# Patient Record
Sex: FEMALE | Race: WHITE | NOT HISPANIC OR LATINO | Employment: UNEMPLOYED | URBAN - METROPOLITAN AREA
[De-identification: names, ages, dates, MRNs, and addresses within clinical notes are randomized per-mention and may not be internally consistent; named-entity substitution may affect disease eponyms.]

---

## 2018-01-16 ENCOUNTER — PATIENT MESSAGE (OUTPATIENT)
Dept: OBSTETRICS AND GYNECOLOGY | Facility: CLINIC | Age: 28
End: 2018-01-16

## 2018-01-21 DIAGNOSIS — O26.891 PELVIC PAIN IN PREGNANCY, ANTEPARTUM, FIRST TRIMESTER: Primary | ICD-10-CM

## 2018-01-21 DIAGNOSIS — R10.2 PELVIC PAIN IN PREGNANCY, ANTEPARTUM, FIRST TRIMESTER: Primary | ICD-10-CM

## 2018-01-22 ENCOUNTER — TELEPHONE (OUTPATIENT)
Dept: OBSTETRICS AND GYNECOLOGY | Facility: CLINIC | Age: 28
End: 2018-01-22

## 2018-01-22 NOTE — PROGRESS NOTES
Pt with c/o sharp pelvic pain in early pregnancy.  No bleeding.  Will order US to establish viability and r/o ectopic

## 2018-01-22 NOTE — TELEPHONE ENCOUNTER
Spoke to pt. About scheduling ultrasound appointment.  Spoke  to midwife to confirm plan.  Pt has appointment tomorrow at 1 pm to confirm pregnancy and will schedule u/s after appointment with provider.  Instructed pt if abdominal pain worsens before tomorrow's visit to go to ED for assessment.  Pt verbalized understanding.

## 2018-01-23 ENCOUNTER — HOSPITAL ENCOUNTER (OUTPATIENT)
Dept: RADIOLOGY | Facility: OTHER | Age: 28
Discharge: HOME | End: 2018-01-23
Attending: ADVANCED PRACTICE MIDWIFE
Payer: COMMERCIAL

## 2018-01-23 ENCOUNTER — OFFICE VISIT (OUTPATIENT)
Dept: OBSTETRICS AND GYNECOLOGY | Facility: CLINIC | Age: 28
End: 2018-01-23
Payer: COMMERCIAL

## 2018-01-23 VITALS
HEIGHT: 61 IN | BODY MASS INDEX: 19 KG/M2 | SYSTOLIC BLOOD PRESSURE: 112 MMHG | WEIGHT: 100.63 LBS | DIASTOLIC BLOOD PRESSURE: 68 MMHG

## 2018-01-23 DIAGNOSIS — O21.9 NAUSEA AND VOMITING DURING PREGNANCY PRIOR TO 22 WEEKS GESTATION: ICD-10-CM

## 2018-01-23 DIAGNOSIS — Z86.59 HISTORY OF POSTPARTUM DEPRESSION, CURRENTLY PREGNANT: ICD-10-CM

## 2018-01-23 DIAGNOSIS — R10.2 PELVIC PAIN IN PREGNANCY, ANTEPARTUM, FIRST TRIMESTER: Primary | ICD-10-CM

## 2018-01-23 DIAGNOSIS — R10.2 PELVIC PAIN IN PREGNANCY, ANTEPARTUM, FIRST TRIMESTER: ICD-10-CM

## 2018-01-23 DIAGNOSIS — O26.891 PELVIC PAIN IN PREGNANCY, ANTEPARTUM, FIRST TRIMESTER: ICD-10-CM

## 2018-01-23 DIAGNOSIS — Z91.89 AT RISK FOR INEFFECTIVE BREASTFEEDING: ICD-10-CM

## 2018-01-23 DIAGNOSIS — O26.891 PELVIC PAIN IN PREGNANCY, ANTEPARTUM, FIRST TRIMESTER: Primary | ICD-10-CM

## 2018-01-23 DIAGNOSIS — O99.891 HISTORY OF POSTPARTUM DEPRESSION, CURRENTLY PREGNANT: ICD-10-CM

## 2018-01-23 DIAGNOSIS — Z32.00 POSSIBLE PREGNANCY: ICD-10-CM

## 2018-01-23 DIAGNOSIS — Z87.59 HISTORY OF 3 SPONTANEOUS ABORTIONS: ICD-10-CM

## 2018-01-23 DIAGNOSIS — Z34.91 FIRST TRIMESTER PREGNANCY: ICD-10-CM

## 2018-01-23 DIAGNOSIS — O34.40 CERVICAL ABNORMALITY AFFECTING PREGNANCY, ANTEPARTUM: ICD-10-CM

## 2018-01-23 LAB
B-HCG UR QL: POSITIVE
CTP QC/QA: YES

## 2018-01-23 PROCEDURE — 76801 OB US < 14 WKS SINGLE FETUS: CPT | Mod: TC

## 2018-01-23 PROCEDURE — 76801 OB US < 14 WKS SINGLE FETUS: CPT | Mod: 26,,, | Performed by: RADIOLOGY

## 2018-01-23 PROCEDURE — 99999 PR PBB SHADOW E&M-EST. PATIENT-LVL III: CPT | Mod: PBBFAC,,, | Performed by: ADVANCED PRACTICE MIDWIFE

## 2018-01-23 PROCEDURE — 81025 URINE PREGNANCY TEST: CPT | Mod: S$GLB,,, | Performed by: ADVANCED PRACTICE MIDWIFE

## 2018-01-23 PROCEDURE — 99203 OFFICE O/P NEW LOW 30 MIN: CPT | Mod: S$GLB,,, | Performed by: ADVANCED PRACTICE MIDWIFE

## 2018-01-23 PROCEDURE — 87491 CHLMYD TRACH DNA AMP PROBE: CPT

## 2018-01-23 RX ORDER — PROMETHAZINE HYDROCHLORIDE 25 MG/1
25 TABLET ORAL EVERY 6 HOURS PRN
Qty: 30 TABLET | Refills: 0 | Status: SHIPPED | OUTPATIENT
Start: 2018-01-23 | End: 2018-02-02

## 2018-01-23 RX ORDER — FERROUS SULFATE 325(65) MG
325 TABLET ORAL
Status: ON HOLD | COMMUNITY
End: 2018-09-04 | Stop reason: HOSPADM

## 2018-01-23 RX ORDER — DIPHENHYDRAMINE HCL 50 MG
50 CAPSULE ORAL EVERY 6 HOURS PRN
COMMUNITY
End: 2018-01-23 | Stop reason: ALTCHOICE

## 2018-01-23 NOTE — PROGRESS NOTES
Ratna Hanson is a 27 y.o. , presents today for amenorrhea.      C/C: amenorrhea, possible pregnancy    HPI: Reports amenorrhea since Patient's last menstrual period was 2017 (exact date). Prior to LMP, menses were regular occuring every 28 days prior.  Current contraception  none .    + UPT on (2017). does report nausea and vomiting, taking Unisom and Vit B6, has taken Zofran, would prefer Phenergan. does report breast tenderness. does not report vaginal bleeding since LMP.  Pt does report right pelvic pain x last week.    PAP HISTORY: last pap (2017), result Normal does not have a history of abnormal paps    Updated Medication List:  Current Outpatient Prescriptions   Medication Sig Dispense Refill    diphenhydrAMINE (BENADRYL) 50 MG capsule Take 50 mg by mouth every 6 (six) hours as needed for Itching.      ferrous sulfate 325 mg (65 mg iron) Tab tablet Take 325 mg by mouth daily with breakfast.      PNV COMB NO.59/IRON/FA/DHA (PRENATAL-DHA ORAL) Take by mouth.      PRENATAL VIT/IRON FUM/FOLIC AC (PRENATAL-FOLIC ACID ORAL) Take by mouth.       No current facility-administered medications for this visit.        SOCIAL HISTORY: denies  emotional/mental/physical/sexual violence or abuse. does  Feel safe at home. Accompanied today by .  This is their first child together.    Review of patient's allergies indicates:  No Known Allergies  Past Medical History:   Diagnosis Date    Breast disorder     breast cysts    Postpartum depression     used seroquil     Past Surgical History:   Procedure Laterality Date    BREAST SURGERY      augmentation bilateral    CHOLECYSTECTOMY      gall bladder Bilateral      Past Surgical History:   Procedure Laterality Date    BREAST SURGERY      augmentation bilateral    CHOLECYSTECTOMY      gall bladder Bilateral      OB History    Para Term  AB Living   2 1 1     1   SAB TAB Ectopic Multiple Live Births           1      # Outcome  Date GA Lbr Albaro/2nd Weight Sex Delivery Anes PTL Lv   2 Current            1 Term 06 38w0d  2.948 kg (6 lb 8 oz) M Vag-Vacuum EPI  SOWMYA      Complications: Failure to Progress in First Stage        OB History      Para Term  AB Living    2 1 1     1    SAB TAB Ectopic Multiple Live Births            1        Social History     Social History    Marital status:      Spouse name: N/A    Number of children: N/A    Years of education: N/A     Occupational History    Not on file.     Social History Main Topics    Smoking status: Never Smoker    Smokeless tobacco: Never Used    Alcohol use Yes      Comment: none since pregnancy    Drug use: No    Sexual activity: Yes     Birth control/ protection: None      Comment: planning pregnancy     Other Topics Concern    Not on file     Social History Narrative    No narrative on file     Family History   Problem Relation Age of Onset    Heart disease Mother     Heart attacks under age 50 Mother     No Known Problems Father     Hyperlipidemia Sister     Heart disease Paternal Grandfather     No Known Problems Paternal Grandmother     No Known Problems Maternal Grandmother      History   Sexual Activity    Sexual activity: Yes    Birth control/ protection: None     Comment: planning pregnancy       GENETIC SCREENING   Patient's age 35 years or older as of estimated date of delivery? No  Nural tube defect (meningomyelocele, spina bifida, or anencephaly)? no  Down syndrome? no  Alexei-Sachs (Ashkenazi Episcopalian, Cajun, Bel[place]ian [place])? no  Canavan disease (Ashkenazi Episcopalian)? no  Familial dysautonomia (Ashkenazi Episcopalian)? no  Sickle cell disease or trait ()? no  Hemophilia or other blood disorders? no  Cystic fibrosis? no  Muscular dystrophy? no  Janet's chorea? no  Thalassemia (Italian, Greek, Mediterranean, or  background) MCV less than 80? no  Congenital heart defect? no  Mental retardation/autism? no   If Yes, was person  "tested for Fragile X? no  Other inherited genetic or chromosomal disorder? no  Maternal metabolic disorder (e.g. type 1 diabetes, PKU)? no  Patient or baby's father had a child with birth defects not listed above? no  Recurrent pregnancy loss or a stillbirth: ? SAB x 3  Medications (including supplements, vitamins, herbs or OTC drugs)/illicit/recreational drugs/alcohol since last menstrual period? Prenatal vitamins   If yes, agent(s) and strength/dose:   List any other genetic risks:   Comments/counseling:     INFECTION HISTORY  Live with someone with TB or exposed to TB: no  Patient or partner has history of genital herpes: no  Rash or viral illness since last menstrual period: no  Patient or partner has hepatitis B or C: no  History of STD, gonorrhea, chlamydia, HPV, HIV, syphilis (list all that apply): no  List other infections: non  Additional comments:       ROS: Constitutional/Gen: Denies fevers, chills, malaise, or weight loss. reports  fatigue   Psych: Denies depression, anxiety  Eyes: Denies changes in vision or scotomata  Ears, nose, mouth, throat: Denies sinus tenderness, swelling, or dentition problems  CV/vasc: Denies heart palpitations or edema  Resp: Denies SOB or dyspnea  Breasts: Denies mass, nipple discharge, or trauma.reports  breast tenderness.  GI: reports constipation, no diarrhea, reports  vomiting. reports  nausea.  : Denies vaginal discharge, dysuria or pelvic pain. reports  urinary frequency  MS: Denies weakness, soreness, or changes in ROM    OBJECTIVE:  /68   Ht 5' 1" (1.549 m)   Wt 45.7 kg (100 lb 10.2 oz)   LMP 11/29/2017 (Exact Date)   BMI 19.02 kg/m²   Constitutional/Gen: NAD, appears stated age, oriented x 3  Neck: supple, no masses or enlargement  Head: normocephalic  Skin: warm and dry w/o rash  Mouth:  Caries absent  Lung: normal resp effort, CTAB  Heart: normal HR, RRR   Abdomen: soft, nontender, no masses  Lymph nodes: Cervical, supraclavicular, axillary, and " inguinal nodes normal.  External genitalia: no lesions or discharge, normal hair distribution  Urethral meatus: normal size and location, no lesions or prolapse  Vagina: normal appearance, no lesions, no discharge,  Cervix: normal appearance, no discharge, no lesions, negative CMT  Uterus: nontender, mobile, approx 8 week size, normal contour and position.  Adnexa: tenderness on left side, 2 cm firm, round, mobile mass left side  Anus/Perineum: normal appearance, with no lesions or discharge. Internal exam deferred.  Extremities: FROM, with no edema or tenderness.  Neurologic: A&O x 4  Psych: affect appropriate and without signs of mood, thought or memory difficulty appreciated    UPT positive in office    ASSESSMENT:  27 y.o. female  with amenorrhea  Pregnancy confirmed  Likely at 7w6d S equal D  Body mass index is 19.02 kg/m².   Patient Active Problem List   Diagnosis    Nausea and vomiting during pregnancy prior to 22 weeks gestation    History of 3 spontaneous abortions    Cervical abnormality affecting pregnancy, antepartum    Pelvic pain in pregnancy, antepartum, first trimester    History of postpartum depression, currently pregnant         PLAN:  Amenorrhea  -- + UPT in office, Patient's last menstrual period was 2017 (exact date). --> Estimated Date of Delivery: 18.  DANIEL confirmed by US today  -- Dating US today in imaging to r/o ectopic.  Single, viable IUP  -- Anatomical US ordered  -- Routine  prenatal labs drawn today  -- GC/CT collected today  Pelvic pain:  US today to r/o ectopic pregnancy  Left ovary with small cysts and corpus luteum cysts on US, c/w physical findings.  No physical or ultrasound findings to explain right pelvic pain.  Pt thinks pain is more in her hip bone than in pelvis.  Will f/u prn  N&V:  Using Unisom and Vitamin B-6 with little relief.  Has used Zofran a few times with relief of sx.  Discussed Zofran no longer recommended in early pregnancy.  Rx for  "Phenergan sent to pharmacy.  Hx SAB x 3:  Pt had pos pregnancy test x 3 in 2017 followed by menses at around the expected time.  No f/u or clinical confirmation of pregnancy.  On us today, "Please note there is fluid within the cervical canal distally measuring 1.9 cm in length with slight cervical funneling concerning for possible early cervical incompetence".  Pt with hx term pregnancy, no hx cervical surgery.  Will refer to [facility] for evaluation of cervical length.  Hx postpartum depression:  Will evaluate prn       Body mass index is 19.02 kg/m².  -- Discussed IOM recommended weight gain of:   Underweight Less than 18.5 28-40    Normal Weight 18.5-24.9  25-35    Overweight 25-29.9  15-25    Obese   30 and greater  11-20   -- Discussed criteria for delivery at [facility] r/t excessive pre-preg weight or excessive weight gain:   Pre-pregnancy BMI over 40 or excess pregnancy weight gain defined as:   Pre-preg BMI < 18.5; Excess weight gain = > 60 pound   Pre-preg BMI 18.5-24.9;  Excess weight gain = > 53 pounds   Pre-preg BMI 25-29.9;  Excess weight gain = > 38 pounds   Pre-preg BMI > 30;  Excess weight gain = > 30 pounds     Discussed nausea and vomiting in pregnancy  -- Education regarding lifestyle and dietary modifications  -- Reviewed use of B6/Unisom prn. Pt will notify us if no relief/worsening symptoms, will consider alternative therapies prn    Pregnancy education and counseling; handouts and booklet provided  -- She has not already attended [facility] and has not toured facility.  Will do so today after appointment  -- Oriented to practice and anticipated prenatal course of care and how to contact us  -- Precautions/warning signs reviewed  -- Common complaints of pregnancy  -- Routine prenatal labs including HIV  -- Ultrasounds  -- Childbirth education/hospital/[facility] facilities  -- Nutrition, prepregnant BMI, and recommended weight gain  -- Toxoplasmosis precautions (Cats/Raw Meat) .. 2 days  -- Sexual activity " and exercise  -- Environmental/Work hazards  -- Travel  -- Tobacco (Ask, Advise, Assess, Assist, and Arrange), as well as alcohol and drug use  -- Use of any medications (Including supplements, Vitamins, Herbs, or OTC Drugs)  -- Domestic violence screen      Reviewed genetic testing options. Reviewed available first trimester and/or second   trimester screening options. Reviewed risk of false positive/negative results and recommendation of referral to [facility] in event of a positive result, for NIPT, US, and/or amniocentesis.  Patient does not desire genetic screening. Reminded pt that screening options are time dependent and to call us if changes her mind            Reviewed warning signs, precautions, and how/when to contact us.        RTC 4 weeks, call or present sooner prn.

## 2018-01-24 LAB
C TRACH DNA SPEC QL NAA+PROBE: NOT DETECTED
N GONORRHOEA DNA SPEC QL NAA+PROBE: NOT DETECTED

## 2018-01-30 ENCOUNTER — PATIENT MESSAGE (OUTPATIENT)
Dept: OBSTETRICS AND GYNECOLOGY | Facility: CLINIC | Age: 28
End: 2018-01-30

## 2018-01-31 ENCOUNTER — TELEPHONE (OUTPATIENT)
Dept: MATERNAL FETAL MEDICINE | Facility: CLINIC | Age: 28
End: 2018-01-31

## 2018-01-31 NOTE — TELEPHONE ENCOUNTER
"After discussing with Dr. Crouch, 12 weeks gestation would be the appropriate time frame that we saw patient for consult and ultrasound.    Nurse phoned patient and apologized for the delay in calling to schedule her appointment. Nurse reiterated to patient that she discussed with Dr. Crouch regarding her Consult and Ultrasound. Patient verbalized understanding for information received and is scheduled for an ultrasound and consult on Wednesday 02/21/2018 with Dr. Duarte.     Since patient is being scanned around 12 weeks, nurse did inquire if patient was interested in the NT/Sequential Screen and patient wanted to know if this was part of routine pregnancy course. Nurse clarified that it is offered to all pregnant women at [facility], but it is an optional screening within pregnancy. Patient did elect to have the NT scheduled at her consult, but nurse let patient know if between now and then she decides to not do the NT we can always cancel that portion.    Patient verbalized understanding of all information received. Nurse to route message to the midwives to update.      ----- Message from Kia Majano CNM sent at 1/30/2018  6:21 PM CST -----  Regarding: Hi, this patient was referred for evaluation due to early report of "funneling in cervix."   Hi,   It was ordered 1/23 by NOELLE Johnson CNM and the patient stated she had not had that appointment scheduled yet. She is about 9 weeks gestation with a DANIEL of 9/5/2018. When would the [facility] docs like to see her?  Thanks for clarifying,  Kia"

## 2018-02-19 ENCOUNTER — PATIENT MESSAGE (OUTPATIENT)
Dept: OBSTETRICS AND GYNECOLOGY | Facility: CLINIC | Age: 28
End: 2018-02-19

## 2018-02-20 PROBLEM — O09.299 H/O DELIVERY BY VACUUM EXTRACTION, CURRENTLY PREGNANT: Status: ACTIVE | Noted: 2018-02-20

## 2018-02-21 ENCOUNTER — OFFICE VISIT (OUTPATIENT)
Dept: MATERNAL FETAL MEDICINE | Facility: CLINIC | Age: 28
End: 2018-02-21
Attending: OBSTETRICS & GYNECOLOGY
Payer: COMMERCIAL

## 2018-02-21 ENCOUNTER — PATIENT MESSAGE (OUTPATIENT)
Dept: OBSTETRICS AND GYNECOLOGY | Facility: CLINIC | Age: 28
End: 2018-02-21

## 2018-02-21 ENCOUNTER — ROUTINE PRENATAL (OUTPATIENT)
Dept: OBSTETRICS AND GYNECOLOGY | Facility: CLINIC | Age: 28
End: 2018-02-21
Payer: COMMERCIAL

## 2018-02-21 VITALS
SYSTOLIC BLOOD PRESSURE: 118 MMHG | DIASTOLIC BLOOD PRESSURE: 80 MMHG | WEIGHT: 104.94 LBS | BODY MASS INDEX: 19.83 KG/M2

## 2018-02-21 DIAGNOSIS — Z34.91 FIRST TRIMESTER PREGNANCY: ICD-10-CM

## 2018-02-21 DIAGNOSIS — O21.9 NAUSEA/VOMITING IN PREGNANCY: ICD-10-CM

## 2018-02-21 DIAGNOSIS — Z34.91 PRENATAL CARE IN FIRST TRIMESTER: Primary | ICD-10-CM

## 2018-02-21 PROBLEM — Z91.89 AT RISK FOR INEFFECTIVE BREASTFEEDING: Status: RESOLVED | Noted: 2018-01-23 | Resolved: 2018-02-21

## 2018-02-21 PROBLEM — O34.40: Status: RESOLVED | Noted: 2018-01-23 | Resolved: 2018-02-21

## 2018-02-21 PROCEDURE — 99999 PR PBB SHADOW E&M-EST. PATIENT-LVL III: CPT | Mod: PBBFAC,,, | Performed by: ADVANCED PRACTICE MIDWIFE

## 2018-02-21 PROCEDURE — 87086 URINE CULTURE/COLONY COUNT: CPT

## 2018-02-21 PROCEDURE — 99202 OFFICE O/P NEW SF 15 MIN: CPT | Mod: 25,S$GLB,, | Performed by: OBSTETRICS & GYNECOLOGY

## 2018-02-21 PROCEDURE — 76813 OB US NUCHAL MEAS 1 GEST: CPT | Mod: S$GLB,,, | Performed by: OBSTETRICS & GYNECOLOGY

## 2018-02-21 PROCEDURE — 3008F BODY MASS INDEX DOCD: CPT | Mod: S$GLB,,, | Performed by: OBSTETRICS & GYNECOLOGY

## 2018-02-21 PROCEDURE — 76801 OB US < 14 WKS SINGLE FETUS: CPT | Mod: S$GLB,,, | Performed by: OBSTETRICS & GYNECOLOGY

## 2018-02-21 PROCEDURE — 99999 PR PBB SHADOW E&M-EST. PATIENT-LVL II: CPT | Mod: PBBFAC,,, | Performed by: OBSTETRICS & GYNECOLOGY

## 2018-02-21 PROCEDURE — 0502F SUBSEQUENT PRENATAL CARE: CPT | Mod: S$GLB,,, | Performed by: ADVANCED PRACTICE MIDWIFE

## 2018-02-21 RX ORDER — PROMETHAZINE HYDROCHLORIDE 25 MG/1
25 TABLET ORAL EVERY 4 HOURS
Qty: 15 TABLET | Refills: 0 | Status: SHIPPED | OUTPATIENT
Start: 2018-02-21 | End: 2018-03-12 | Stop reason: SDUPTHER

## 2018-02-21 NOTE — LETTER
February 21, 2018      Marquita Johnson, TYESHA  [address]  [facility]  [facility] [address]           [facility]  [address]  [place] [address]  Phone: [phone]          Patient: Ratna Hanson   MR Number: [id number]   YOB: 1990   Date of Visit: 2/21/2018       Dear Marquita Johnson:    Thank you for referring Ratna Hanson to me for evaluation. Attached you will find relevant portions of my assessment and plan of care.    If you have questions, please do not hesitate to call me. I look forward to following Ratna Hanson along with you.    Sincerely,    Leonard Duarte MD    Enclosure  CC:  No Recipients    If you would like to receive this communication electronically, please contact [email or web address] or [phone] to request more information on [employer] access.    For providers and/or their staff who would like to refer a patient to [facility], please contact us through our one-stop-shop provider referral line, [facility], at [phone].    If you feel you have received this communication in error or would no longer like to receive these types of communications, please e-mail [email or web address]

## 2018-02-21 NOTE — PROGRESS NOTES
Chief complaint: Cervical funnel, aneuploidy screening    Provider requesting consultation: Alex CNM    27 y.o. [id number] Unknown EGA     PMH:  Past Medical History:   Diagnosis Date    Breast disorder     breast cysts    Postpartum depression     used seroquil       PObHx:  OB History    Para Term  AB Living   5 1 1   3 1   SAB TAB Ectopic Multiple Live Births   3       1      # Outcome Date GA Lbr Albaro/2nd Weight Sex Delivery Anes PTL Lv   5 Current            4 SAB 17           3 SAB 17 4w0d          2 SAB 10/31/16           1 Term 06 38w0d  2.948 kg (6 lb 8 oz) M Vag-Vacuum EPI  SOWMYA      Complications: Failure to Progress in First Stage          PSH:  Past Surgical History:   Procedure Laterality Date    BREAST SURGERY      augmentation bilateral    CHOLECYSTECTOMY      gall bladder Bilateral        Family history:family history includes Heart attacks under age 50 in her mother; Heart disease in her mother and paternal grandfather; Hyperlipidemia in her sister; No Known Problems in her father, maternal grandmother, and paternal grandmother.    Social history: reports that she has never smoked. She has never used smokeless tobacco. She reports that she drinks alcohol. She reports that she does not use drugs.    A detailed fetal anatomical ultrasound was completed today.  See details in imaging section of EPIC.      Age based risk for Down syndrome at this gestational age is approximately 1 in 900    Today the patient was counseled on the relationship between maternal age and gentic aneuploidy.  The patient was counseled on the risks and benefits of screening tests versus definitive genetic testing (chorionic villus sampling (CVS) or amniocentesis).   I quoted the patient a 1 in 100 procedure related risk of fetal loss with CVS and a 1 in 500 procedure related risk of fetal loss with genetic amniocentesis  The patient elected not  to undergo first trimester screening  today.    She was also seen as she had been told her cervix was funneling at 8 weeks.  I reviewed the radiology ultrasound images.  No funnel was seen.  There is a distict wide band of cervical mucus that apparently was taken for a funnel. On today's US the cervix has normal length and no evidence of funneling was seen.  This was explained to the patient and she was reassured.      The patient was given an opportunity to ask questions about management and the diease process.  She expressed an understanding of and agreement to the above impression and plan. All questions were answered to her satisfaction.  She was given contact information to the [facility] clinic to address further concerns.      The approximate physician face-to-face time was 15minutes. The majority of the time (>50%) was spent on counseling of the patient or coordination of care.

## 2018-02-21 NOTE — PROGRESS NOTES
27 y.o. female  at 12w0d by LMP c/w 7wk US  Unaccompanied today  Denies VB, LOF or cramping  Concern for possible cervical funneling on US read by radiology however f/u US with MFM read by Dr Duarte was reassuring without funneling  Has decided to decline genetic screening  N/V of pregnancy  -- Phenergan helping, refill provided, warning signs reviewed  Hx PPD  -- Discussed, reviewed possible treatments  -- She agrees to call/seek evaluation with development of sx or HI/SI  Hx breast surgery  -- May not want to breastfeed and we discussed this; reviewed benefits of. She would like to feel and be supported in her decision if it is to NOT breastfeed and does not want pressure to breastfeed  Hx VAVD  Reviewed prenatal labs  Anatomy US ordered  Reviewed warning signs, normal FM, pregnancy precautions and how/when to call.  RTC x 4 wks, call or present sooner prn.

## 2018-02-22 VITALS
WEIGHT: 104.94 LBS | SYSTOLIC BLOOD PRESSURE: 124 MMHG | BODY MASS INDEX: 19.83 KG/M2 | DIASTOLIC BLOOD PRESSURE: 72 MMHG

## 2018-02-22 LAB — BACTERIA UR CULT: NORMAL

## 2018-02-26 ENCOUNTER — HOSPITAL ENCOUNTER (EMERGENCY)
Facility: OTHER | Age: 28
Discharge: HOME | End: 2018-02-26
Attending: EMERGENCY MEDICINE
Payer: COMMERCIAL

## 2018-02-26 VITALS
OXYGEN SATURATION: 100 % | RESPIRATION RATE: 18 BRPM | SYSTOLIC BLOOD PRESSURE: 134 MMHG | BODY MASS INDEX: 19.14 KG/M2 | WEIGHT: 104 LBS | TEMPERATURE: 99 F | HEIGHT: 62 IN | DIASTOLIC BLOOD PRESSURE: 90 MMHG | HEART RATE: 88 BPM

## 2018-02-26 DIAGNOSIS — O21.0 HYPEREMESIS GRAVIDARUM: Primary | ICD-10-CM

## 2018-02-26 LAB
ALBUMIN SERPL BCP-MCNC: 3.7 G/DL
ALP SERPL-CCNC: 148 U/L
ALT SERPL W/O P-5'-P-CCNC: 89 U/L
ANION GAP SERPL CALC-SCNC: 12 MMOL/L
AST SERPL-CCNC: 55 U/L
BACTERIA #/AREA URNS HPF: NORMAL /HPF
BASOPHILS # BLD AUTO: 0.02 K/UL
BASOPHILS NFR BLD: 0.2 %
BILIRUB SERPL-MCNC: 0.3 MG/DL
BILIRUB UR QL STRIP: NEGATIVE
BUN SERPL-MCNC: 7 MG/DL
CALCIUM SERPL-MCNC: 9.4 MG/DL
CHLORIDE SERPL-SCNC: 101 MMOL/L
CLARITY UR: ABNORMAL
CO2 SERPL-SCNC: 24 MMOL/L
COLOR UR: YELLOW
CREAT SERPL-MCNC: 0.6 MG/DL
DIFFERENTIAL METHOD: ABNORMAL
EOSINOPHIL # BLD AUTO: 0 K/UL
EOSINOPHIL NFR BLD: 0.3 %
ERYTHROCYTE [DISTWIDTH] IN BLOOD BY AUTOMATED COUNT: 12.1 %
EST. GFR  (AFRICAN AMERICAN): >60 ML/MIN/1.73 M^2
EST. GFR  (NON AFRICAN AMERICAN): >60 ML/MIN/1.73 M^2
GLUCOSE SERPL-MCNC: 91 MG/DL
GLUCOSE UR QL STRIP: NEGATIVE
HCT VFR BLD AUTO: 34 %
HGB BLD-MCNC: 11.8 G/DL
HGB UR QL STRIP: NEGATIVE
KETONES UR QL STRIP: NEGATIVE
LEUKOCYTE ESTERASE UR QL STRIP: ABNORMAL
LIPASE SERPL-CCNC: 10 U/L
LYMPHOCYTES # BLD AUTO: 2.1 K/UL
LYMPHOCYTES NFR BLD: 16.2 %
MCH RBC QN AUTO: 31.5 PG
MCHC RBC AUTO-ENTMCNC: 34.7 G/DL
MCV RBC AUTO: 91 FL
MICROSCOPIC COMMENT: NORMAL
MONOCYTES # BLD AUTO: 0.6 K/UL
MONOCYTES NFR BLD: 4.6 %
NEUTROPHILS # BLD AUTO: 10 K/UL
NEUTROPHILS NFR BLD: 78.2 %
NITRITE UR QL STRIP: NEGATIVE
PH UR STRIP: 7 [PH] (ref 5–8)
PLATELET # BLD AUTO: 291 K/UL
PMV BLD AUTO: 9.3 FL
POTASSIUM SERPL-SCNC: 4.1 MMOL/L
PROT SERPL-MCNC: 7.6 G/DL
PROT UR QL STRIP: NEGATIVE
RBC # BLD AUTO: 3.75 M/UL
SODIUM SERPL-SCNC: 137 MMOL/L
SP GR UR STRIP: 1.01 (ref 1–1.03)
SQUAMOUS #/AREA URNS HPF: 35 /HPF
URN SPEC COLLECT METH UR: ABNORMAL
UROBILINOGEN UR STRIP-ACNC: NEGATIVE EU/DL
WBC # BLD AUTO: 12.83 K/UL
WBC #/AREA URNS HPF: 5 /HPF (ref 0–5)

## 2018-02-26 PROCEDURE — 99284 EMERGENCY DEPT VISIT MOD MDM: CPT | Mod: 25

## 2018-02-26 PROCEDURE — 80053 COMPREHEN METABOLIC PANEL: CPT

## 2018-02-26 PROCEDURE — 85025 COMPLETE CBC W/AUTO DIFF WBC: CPT

## 2018-02-26 PROCEDURE — 63600175 PHARM REV CODE 636 W HCPCS: Performed by: EMERGENCY MEDICINE

## 2018-02-26 PROCEDURE — 96365 THER/PROPH/DIAG IV INF INIT: CPT

## 2018-02-26 PROCEDURE — 81000 URINALYSIS NONAUTO W/SCOPE: CPT

## 2018-02-26 PROCEDURE — 96366 THER/PROPH/DIAG IV INF ADDON: CPT

## 2018-02-26 PROCEDURE — 96375 TX/PRO/DX INJ NEW DRUG ADDON: CPT

## 2018-02-26 PROCEDURE — 83690 ASSAY OF LIPASE: CPT

## 2018-02-26 PROCEDURE — 25000003 PHARM REV CODE 250: Performed by: EMERGENCY MEDICINE

## 2018-02-26 RX ORDER — ONDANSETRON 2 MG/ML
4 INJECTION INTRAMUSCULAR; INTRAVENOUS
Status: COMPLETED | OUTPATIENT
Start: 2018-02-26 | End: 2018-02-26

## 2018-02-26 RX ORDER — DEXTROSE MONOHYDRATE, SODIUM CHLORIDE, AND POTASSIUM CHLORIDE 50; 2.98; 4.5 G/1000ML; G/1000ML; G/1000ML
INJECTION, SOLUTION INTRAVENOUS CONTINUOUS
Status: DISCONTINUED | OUTPATIENT
Start: 2018-02-26 | End: 2018-02-27 | Stop reason: HOSPADM

## 2018-02-26 RX ORDER — METOCLOPRAMIDE 10 MG/1
10 TABLET ORAL EVERY 6 HOURS PRN
Qty: 15 TABLET | Refills: 0 | Status: ON HOLD | OUTPATIENT
Start: 2018-02-26 | End: 2018-09-04 | Stop reason: HOSPADM

## 2018-02-26 RX ADMIN — DEXTROSE MONOHYDRATE, SODIUM CHLORIDE, AND POTASSIUM CHLORIDE: 50; 4.5; 2.98 INJECTION, SOLUTION INTRAVENOUS at 10:02

## 2018-02-26 RX ADMIN — ONDANSETRON 4 MG: 2 INJECTION, SOLUTION INTRAMUSCULAR; INTRAVENOUS at 10:02

## 2018-02-26 RX ADMIN — SODIUM CHLORIDE 1000 ML: 0.9 INJECTION, SOLUTION INTRAVENOUS at 10:02

## 2018-02-27 NOTE — ED TRIAGE NOTES
"+n/v. Pt states " I haven't been able to eat/drink for about 8 weeks. I am taking phenergan but it hasn't really helped. I am vomiting less but it hasn't stopped completely. I am dizzy and having abdominal cramps. I am seeing the midwives and they told me to come in if I am feeling dehydrated. ." pt is 12 weeks pregnant.     "

## 2018-02-27 NOTE — ED PROVIDER NOTES
Encounter Date: 2018    SCRIBE #1 NOTE: Lyndsey SALAMANCA am scribing for, and in the presence of, Dr. Cote.       History     Chief Complaint   Patient presents with    Emesis During Pregnancy     Pt claims she is 12 weeks pregnant and has not been able to stop throwing up. Past 2 days pt has having a increase in nasuea and vomiting, pt is [facility]     Time seen by provider: 9:14 PM    This is a 27 y.o. pregnant female, A1, at approximately 13 weeks gestation who presents with complaint of N/V x 4 weeks. She reports vomiting with any PO intake and dry heaving or emesis of bile when she hasn't eaten. She states that she feels dehydrated today with associated abdominal cramping and feeling lightheaded. She also has had decreased frequency of urination. Pt has been taking Phenergan tablets and Unisom with minimal relief. She reports similar, but less severe, N/V with first pregnancy approximately 12 years ago. She also took Phenergan at that time with some relief. Pt has a hx of miscarriage. She denies any fever, chills, diarrhea, weakness, dizziness, vaginal bleeding, dysuria, and urinary urgency. Pt is being followed by midwives at the [facility]; she last saw them last week and has next appointment in 3 weeks. Pt has NDKA.      The history is provided by the patient.     Review of patient's allergies indicates:  No Known Allergies  Past Medical History:   Diagnosis Date    Breast disorder     Benign breast cysts; negative imaging    Postpartum depression     Used seroquil x ~ 1 month     Past Surgical History:   Procedure Laterality Date    BREAST SURGERY      augmentation bilateral    CHOLECYSTECTOMY      WISDOM TOOTH EXTRACTION       Family History   Problem Relation Age of Onset    Heart disease Mother     Heart attacks under age 50 Mother     No Known Problems Father     Hyperlipidemia Sister     Heart disease Paternal  Grandfather     No Known Problems Paternal Grandmother     No Known Problems Maternal Grandmother     Breast cancer Neg Hx     Ovarian cancer Neg Hx     Colon cancer Neg Hx      Social History   Substance Use Topics    Smoking status: Never Smoker    Smokeless tobacco: Never Used    Alcohol use Yes      Comment: none since pregnancy     Review of Systems   Constitutional: Negative for chills and fever.   HENT: Negative for congestion, rhinorrhea and sore throat.    Respiratory: Negative for cough and shortness of breath.    Cardiovascular: Negative for chest pain.   Gastrointestinal: Positive for abdominal pain, nausea and vomiting. Negative for constipation and diarrhea.   Endocrine: Negative for polyuria.   Genitourinary: Negative for decreased urine volume, difficulty urinating, dysuria, frequency, hematuria and vaginal bleeding.   Musculoskeletal: Negative for back pain.   Skin: Negative for rash.   Allergic/Immunologic: Negative for immunocompromised state.   Neurological: Positive for light-headedness. Negative for dizziness and weakness.   Hematological: Does not bruise/bleed easily.   Psychiatric/Behavioral: Negative for confusion.       Physical Exam     Initial Vitals [02/26/18 1939]   BP Pulse Resp Temp SpO2   (!) 133/93 110 18 98.6 °F (37 °C) 98 %      MAP       106.33         Physical Exam    Nursing note and vitals reviewed.  Constitutional: She appears well-developed and well-nourished. She is cooperative.  Non-toxic appearance. No distress.   HENT:   Head: Normocephalic and atraumatic.   Dry mucous membranes.   Eyes: Conjunctivae and EOM are normal. Pupils are equal, round, and reactive to light.   Neck: Normal range of motion and full passive range of motion without pain. Neck supple.   Cardiovascular: Normal rate, regular rhythm, normal heart sounds and normal pulses.   Pulmonary/Chest: Effort normal and breath sounds normal. No respiratory distress. She has no wheezes. She has no rales.    Abdominal: Soft. Normal appearance and bowel sounds are normal. She exhibits no distension. There is no tenderness.   Musculoskeletal: Normal range of motion.   Neurological: She is alert and oriented to person, place, and time. She has normal strength. No cranial nerve deficit or sensory deficit.   Skin: Skin is warm, dry and intact. No rash noted.   Psychiatric: She has a normal mood and affect. Her speech is normal and behavior is normal. Judgment and thought content normal.         ED Course   Procedures  Labs Reviewed   URINALYSIS - Abnormal; Notable for the following:        Result Value    Appearance, UA Hazy (*)     Leukocytes, UA 1+ (*)     All other components within normal limits   COMPREHENSIVE METABOLIC PANEL - Abnormal; Notable for the following:     Alkaline Phosphatase 148 (*)     AST 55 (*)     ALT 89 (*)     All other components within normal limits   CBC W/ AUTO DIFFERENTIAL - Abnormal; Notable for the following:     WBC 12.83 (*)     RBC 3.75 (*)     Hemoglobin 11.8 (*)     Hematocrit 34.0 (*)     MCH 31.5 (*)     Gran # (ANC) 10.0 (*)     Gran% 78.2 (*)     Lymph% 16.2 (*)     All other components within normal limits   URINALYSIS MICROSCOPIC   LIPASE             Medical Decision Making:   History:   Old Medical Records: I decided to obtain old medical records.  Old Records Summarized: other records and records from clinic visits.  Initial Assessment:   9:14PM:  Pt is a 26 y/o F who presents to ED with hyperemesis. Pt appears well, nontoxic.  Her abdomen is very soft, nontender. She does have a confirmed IUP on US in our system. Will plan for labs, IVFs, will continue to follow and reassess.    Clinical Tests:   Lab Tests: Ordered and Reviewed    11:42 PM:  Pt doing well, she is feeling better.  Her labs are otherwise unremarkable.   I updated pt regarding results.  Will plan to provide a prescription for reglan to take for nausea given that the phenergan is not working.  She will f/u with the  midwives.  I counseled pt regarding supportive care measures.  I have discussed with the pt ED return warnings and need for close PCP f/u.  Pt agreeable to plan and all questions answered.  I feel that pt is stable for discharge and management as an outpatient and no further intervention is needed at this time.  Pt is comfortable returning to the ED if needed.  Will DC home in stable condition.                  Scribe Attestation:   Scribe #1: I performed the above scribed service and the documentation accurately describes the services I performed. I attest to the accuracy of the note.    Attending Attestation:           Physician Attestation for Scribe:  Physician Attestation Statement for Scribe #1: I, Dr. Cote, reviewed documentation, as scribed by Lyndsey Hubbard in my presence, and it is both accurate and complete.                    Clinical Impression:     1. Hyperemesis gravidarum                               Sirisha Cote MD  02/27/18 0004

## 2018-02-27 NOTE — DISCHARGE INSTRUCTIONS
We have prescribed you nausea medication.  Please fill and take as directed.    Please return to the ER if you have chest pain, difficulty breathing, fevers, altered mental status, dizziness, weakness, or any other concerns.      Follow up with your primary care physician.

## 2018-03-01 ENCOUNTER — TELEPHONE (OUTPATIENT)
Dept: OBSTETRICS AND GYNECOLOGY | Facility: HOSPITAL | Age: 28
End: 2018-03-01

## 2018-03-01 ENCOUNTER — PATIENT MESSAGE (OUTPATIENT)
Dept: OBSTETRICS AND GYNECOLOGY | Facility: CLINIC | Age: 28
End: 2018-03-01

## 2018-03-01 RX ORDER — DOXYLAMINE SUCCINATE AND PYRIDOXINE HYDROCHLORIDE, DELAYED RELEASE TABLETS 10 MG/10 MG 10; 10 MG/1; MG/1
2 TABLET, DELAYED RELEASE ORAL NIGHTLY
Qty: 30 TABLET | Refills: 1 | Status: ON HOLD | OUTPATIENT
Start: 2018-03-01 | End: 2018-09-04 | Stop reason: HOSPADM

## 2018-03-02 NOTE — TELEPHONE ENCOUNTER
"Phone call to Ratna regarding severe N&V.  Seen in ED on Monday and given IV fluids. States she is vomiting everything she drinks. States she has tried james tea, water and Gatorade. States she gets some food "to stay down sometimes."  Unisom worked in the beginning but is not effective now. Phenergan was also helpful for awhile. Was given Zofran but stated it was not helpful at all. Was given a prescription for Reglan but has not tried it yet. Encouraged to try Reglan. Will also prescribe Diclegis to try.alternatively to Reglan.  Encouraged to drink sips of different types of fluid frequently and to eat at least every two hours. [place] foods that might be appealing reviewed. Encouraged to keep us informed of her status.  Encouraged to report for IV fluids if she can not keep fluids down and has S&S of dehydration.  "

## 2018-03-12 ENCOUNTER — TELEPHONE (OUTPATIENT)
Dept: OBSTETRICS AND GYNECOLOGY | Facility: HOSPITAL | Age: 28
End: 2018-03-12

## 2018-03-12 DIAGNOSIS — Z34.91 PRENATAL CARE IN FIRST TRIMESTER: ICD-10-CM

## 2018-03-12 DIAGNOSIS — O21.9 NAUSEA/VOMITING IN PREGNANCY: ICD-10-CM

## 2018-03-12 RX ORDER — PROMETHAZINE HYDROCHLORIDE 25 MG/1
12.5 TABLET ORAL EVERY 4 HOURS PRN
Qty: 30 TABLET | Refills: 1 | Status: SHIPPED | OUTPATIENT
Start: 2018-03-12 | End: 2018-05-01 | Stop reason: SDUPTHER

## 2018-03-12 NOTE — TELEPHONE ENCOUNTER
Why: pt. Stating she needs a new rx. For nausea. Please call to discuss    Phone conversation with Ratna. States she has been taking Phenergan 12.5 every 3-4 hours and Unisom/B6 at night. States her vomiting has decreased to 3-4 times a day with this regiment. States she is starting to gain weight. Phenergan 12.5mg every 4 hours prescribed x30 doses with a refill x 1.

## 2018-03-13 ENCOUNTER — DOCUMENTATION ONLY (OUTPATIENT)
Dept: OBSTETRICS AND GYNECOLOGY | Facility: CLINIC | Age: 28
End: 2018-03-13

## 2018-03-13 NOTE — PROGRESS NOTES
Received records from previous provider.  Test done 51/17  Results:  Negative for Intraepithelial Lesion or malignancy.

## 2018-03-21 ENCOUNTER — ROUTINE PRENATAL (OUTPATIENT)
Dept: OBSTETRICS AND GYNECOLOGY | Facility: CLINIC | Age: 28
End: 2018-03-21
Payer: COMMERCIAL

## 2018-03-21 VITALS — DIASTOLIC BLOOD PRESSURE: 70 MMHG | SYSTOLIC BLOOD PRESSURE: 106 MMHG | BODY MASS INDEX: 20.12 KG/M2 | WEIGHT: 110 LBS

## 2018-03-21 DIAGNOSIS — Z34.90 PREGNANCY WITH ONE FETUS, ANTEPARTUM: Primary | ICD-10-CM

## 2018-03-21 DIAGNOSIS — O21.9 NAUSEA AND VOMITING DURING PREGNANCY PRIOR TO 22 WEEKS GESTATION: ICD-10-CM

## 2018-03-21 PROCEDURE — 0502F SUBSEQUENT PRENATAL CARE: CPT | Mod: S$GLB,,, | Performed by: ADVANCED PRACTICE MIDWIFE

## 2018-03-21 PROCEDURE — 99999 PR PBB SHADOW E&M-EST. PATIENT-LVL III: CPT | Mod: PBBFAC,,, | Performed by: ADVANCED PRACTICE MIDWIFE

## 2018-03-21 NOTE — PROGRESS NOTES
Improving overall in how she is feeling. Nausea continues, vomiting has decreased to 1-2x/day.  Good relief with Phenergan, as needed. Stopped use of B6/Unisom, as didn't feel she was needing it anymore  Reviewed TWlb 5oz. Education re: wt gain recommendations in pregnancy, and discussed excessive wt gain >60lbs would risk out of ABC. Reviewed diet and exercise recommendations in pregnancy.  Has started to feel fetal movement this week.  Suspects already feeling Mansfield Jauregui as had what felt like one, crampy contraction last night - nothing recurrent and no pain since that time. No LOF, no VB. Discussed warning s/s to call/come in with.  Has her anatomy ultrasound scheduled. RTC 4wks.

## 2018-04-18 ENCOUNTER — ROUTINE PRENATAL (OUTPATIENT)
Dept: OBSTETRICS AND GYNECOLOGY | Facility: CLINIC | Age: 28
End: 2018-04-18
Payer: COMMERCIAL

## 2018-04-18 ENCOUNTER — OFFICE VISIT (OUTPATIENT)
Dept: MATERNAL FETAL MEDICINE | Facility: CLINIC | Age: 28
End: 2018-04-18
Payer: COMMERCIAL

## 2018-04-18 VITALS
DIASTOLIC BLOOD PRESSURE: 78 MMHG | SYSTOLIC BLOOD PRESSURE: 120 MMHG | WEIGHT: 116.88 LBS | BODY MASS INDEX: 21.37 KG/M2

## 2018-04-18 DIAGNOSIS — Z34.91 FIRST TRIMESTER PREGNANCY: ICD-10-CM

## 2018-04-18 DIAGNOSIS — Z36.89 ENCOUNTER FOR FETAL ANATOMIC SURVEY: ICD-10-CM

## 2018-04-18 DIAGNOSIS — Z34.92 PRENATAL CARE IN SECOND TRIMESTER: Primary | ICD-10-CM

## 2018-04-18 PROCEDURE — 99499 UNLISTED E&M SERVICE: CPT | Mod: S$GLB,,, | Performed by: OBSTETRICS & GYNECOLOGY

## 2018-04-18 PROCEDURE — 76805 OB US >/= 14 WKS SNGL FETUS: CPT | Mod: S$GLB,,, | Performed by: OBSTETRICS & GYNECOLOGY

## 2018-04-18 PROCEDURE — 99999 PR PBB SHADOW E&M-EST. PATIENT-LVL II: CPT | Mod: PBBFAC,,, | Performed by: ADVANCED PRACTICE MIDWIFE

## 2018-04-18 PROCEDURE — 76817 TRANSVAGINAL US OBSTETRIC: CPT | Mod: S$GLB,,, | Performed by: OBSTETRICS & GYNECOLOGY

## 2018-04-18 PROCEDURE — 0502F SUBSEQUENT PRENATAL CARE: CPT | Mod: S$GLB,,, | Performed by: ADVANCED PRACTICE MIDWIFE

## 2018-04-18 NOTE — PROGRESS NOTES
27 y.o. female  at 20w0d by LMP c/w 7wk US  Starting to feel flutters/FM, denies VB, LOF or cramping  Comes from anatomy US today - it's a girl!  TW lbs for pre preg BMI of 18  Reviewed anatomy US  Reviewed warning signs, normal FM,  labor precautions and how/when to call.  RTC x 4 wks, call or present sooner prn.     Birth Center Risk Assessment: 0  0- CNM management in ABC  1- CNM management on L&D  2- Consultation with OB to develop plan of care  3- Collaborative CNM/OB management with delivery on L&D  4- Referral or transfer of care to MD

## 2018-04-18 NOTE — PROGRESS NOTES
OB Ultrasound Report (see PDF version under imaging tab)    Indication  ========    Fetal anatomy survey.    Pregnancy History  ===============    Maternal Lab Tests  Result: declined screenings    Method  ======    Transabdominal ultrasound examination, Transvaginal ultrasound examination. View: Good view.    Pregnancy  =========    Greer pregnancy. Number of fetuses: 1.    Dating  ======    LMP on: 11/29/2017  Cycle: regular cycle  GA by LMP 20 w + 0 d  DANIEL by LMP: 9/5/2018  Ultrasound examination on: 4/18/2018  GA by U/S based upon: AC, BPD, Femur, HC  GA by U/S 20 w + 1 d  DANIEL by U/S: 9/4/2018  Assigned: Dating performed on 02/21/2018, based on the LMP  Assigned GA 20 w + 0 d  Assigned DANIEL: 9/5/2018    General Evaluation  ===============    Cardiac activity: present.  bpm.  Fetal movements: visualized.  Presentation: cephalic.  Placenta:  Placental site: posterior, fundal. Distance from internal cervical os 32 mm.  Umbilical cord: normal, 3 vessel cord, placental insertion: normal.  Amniotic fluid: normal amount.    Fetal Biometry  ===========    Fetal Biometry  BPD 46.6 mm 20w 1d Hadlock  OFD 61.5 mm 21w 1d Omero  .8 mm 20w 0d Hadlock  .6 mm 20w 2d Hadlock  Femur 33.1 mm 20w 2d Hadlock  Cerebellum tr 20.7 mm 20w 3d Osuna  CM 3.9 mm  Nuchal fold 2.80 mm  Humerus 30.7 mm 20w 1d Omero   g 26% Keny  Calculated by: Hadlock (BPD-HC-AC-FL)  EFW (lb) 0 lb  EFW (oz) 12 oz  Cephalic index 0.76  HC / AC 1.16  FL / BPD 0.71  FL / AC 0.22   bpm  Head / Face / Neck   6.5 mm    Fetal Anatomy  ===========    Cranium: normal  Lateral ventricles: normal  Choroid plexus: normal  Midline falx: normal  Cavum septi pellucidi: normal  Cerebellum: normal  Cisterna magna: normal  Rt lateral ventricle: normal  Lt lateral ventricle: normal  Rt choroid plexus: normal  Lt choroid plexus: normal  Lips: normal  Profile: normal  Nose: normal  4-chamber  view: normal  RVOT: normal  LVOT: normal  Situs: normal  Cardiac position: normal  Cardiac axis: normal  Cardiac size: normal  Cardiac rhythm: normal  Rt lung: normal  Lt lung: normal  Diaphragm: normal  Cord insertion: normal  Stomach: normal  Kidneys: normal  Bladder: normal  Genitals: normal  Abdom. wall: appears normal  Abdom. cavity: normal  Rt kidney: normal  Lt kidney: normal  Cervical spine: normal  Thoracic spine: normal  Lumbar spine: normal  Sacral spine: normal  Arms: normal  Legs: normal  Rt arm: normal  Lt arm: normal  Rt hand: present  Lt hand: present  Rt leg: normal  Lt leg: normal  Rt foot: normal  Lt foot: normal  Gender: female    Maternal Structures  ===============    Uterus / Cervix  Uterus: Normal  Approach: Transvaginal  Cervical length 42.1 mm  Ovaries / Tubes / Adnexa  Rt ovary: Not visualized  Lt ovary: Visualized    Impression  =========    A sullivan living IUP is identified. Fetal size is appropriate for established dating. No fetal structural malformations are identified.  Cervical length is normal, and placental location is normal without evidence of previa. TV scanning was needed to confirm location of  the distal end of the placenta. Follow-up ultrasound as clinically indicated.

## 2018-05-01 DIAGNOSIS — Z34.91 PRENATAL CARE IN FIRST TRIMESTER: ICD-10-CM

## 2018-05-01 DIAGNOSIS — O21.9 NAUSEA/VOMITING IN PREGNANCY: ICD-10-CM

## 2018-05-01 RX ORDER — PROMETHAZINE HYDROCHLORIDE 25 MG/1
12.5 TABLET ORAL EVERY 4 HOURS PRN
Qty: 30 TABLET | Refills: 0 | Status: SHIPPED | OUTPATIENT
Start: 2018-05-01 | End: 2018-05-30 | Stop reason: SDUPTHER

## 2018-05-01 NOTE — TELEPHONE ENCOUNTER
Phone call to Ratna regarding her request for a refill Phenergan prescription. Voice mail message left that I hope she is feeling better and that I would like to talk to her about her persistent nausea. Patient informed that I did renew her Phenergan prescription.

## 2018-05-16 ENCOUNTER — ROUTINE PRENATAL (OUTPATIENT)
Dept: OBSTETRICS AND GYNECOLOGY | Facility: CLINIC | Age: 28
End: 2018-05-16
Payer: COMMERCIAL

## 2018-05-16 VITALS — WEIGHT: 121.5 LBS | SYSTOLIC BLOOD PRESSURE: 122 MMHG | BODY MASS INDEX: 22.22 KG/M2 | DIASTOLIC BLOOD PRESSURE: 72 MMHG

## 2018-05-16 DIAGNOSIS — Z34.92 PRENATAL CARE IN SECOND TRIMESTER: Primary | ICD-10-CM

## 2018-05-16 PROCEDURE — 0502F SUBSEQUENT PRENATAL CARE: CPT | Mod: S$GLB,,, | Performed by: ADVANCED PRACTICE MIDWIFE

## 2018-05-16 PROCEDURE — 99999 PR PBB SHADOW E&M-EST. PATIENT-LVL II: CPT | Mod: PBBFAC,,, | Performed by: ADVANCED PRACTICE MIDWIFE

## 2018-05-16 NOTE — MEDICAL/APP STUDENT
S:   Reports + FM, denies VB, LOF, or CTX. Does report Hilario Jauregui.  Unaccompanied today  Doing well with concerns about upper abdominal pain near laparoscopic scar. Describes pain started when she rolled over in bed, felt like skin or muscles were ripping. Today pain is dull/sore. Denies any other symptoms associated with pain.     O:  Blood pressure: 122/72  Today's weight: 55.1 kg  TW # for pre preg BMI of 19  Fundal height: 23 cm  FHT: 130  Urine: negative protein/negative glucose  Edema: none    A/P:  27 y.o. female  at 24w0d by LMP c/w 7w6d US  -- Reviewed prenatal labs and ultrasounds.  -- Reviewed upcoming labs for 28 week (A POS) visit and orders placed  -- Tdap vaccine offered and ordered, will schedule for next visit  -- Reviewed warning signs, normal FM,  labor precautions and how/when to call.  -- RTC x 4 wks, call or present sooner prn.     Abdominal pain  -- Reviewed probable causes of pain (muscle pain, scar tissue, pressure from growing belly). Discussed warning signs and how/when to call and to RTC/call if pain changes or worsens.  -- Recommended use of maternity support belt as belly grows    20 # Weight gain  -- Reviewed recommendations, healthy dietary choices, and criteria for ABC    Birth Center Risk Assessment: 0- Meets birth center guidelines    0- CNM management in ABC  1- CNM management on L&D  2- Consultation with OB to develop  plan of care  3- Collaborative CNM/OB management with delivery on L&D  4- Referral of care to MD arreguin  5- Permanent referral of care to MD Dipti Esquivel, RN, SNM  2018  10:29 AM

## 2018-05-17 NOTE — PROGRESS NOTES
Please see documentation by student TYESHA Esquivel I was present for evaluation and agree with documentation    Rocío Farias CNM/NP  Certified Nurse Midwife/Nurse Practitioner  5/17/2018 8:05 AM

## 2018-05-26 ENCOUNTER — ANESTHESIA (OUTPATIENT)
Dept: OBSTETRICS AND GYNECOLOGY | Facility: OTHER | Age: 28
End: 2018-05-26

## 2018-05-26 ENCOUNTER — HOSPITAL ENCOUNTER (OUTPATIENT)
Facility: OTHER | Age: 28
LOS: 1 days | Discharge: HOME | End: 2018-05-27
Attending: OBSTETRICS & GYNECOLOGY | Admitting: OBSTETRICS & GYNECOLOGY
Payer: COMMERCIAL

## 2018-05-26 ENCOUNTER — TELEPHONE (OUTPATIENT)
Dept: OBSTETRICS AND GYNECOLOGY | Facility: OTHER | Age: 28
End: 2018-05-26

## 2018-05-26 ENCOUNTER — ANESTHESIA EVENT (OUTPATIENT)
Dept: OBSTETRICS AND GYNECOLOGY | Facility: OTHER | Age: 28
End: 2018-05-26

## 2018-05-26 DIAGNOSIS — R10.9 ABDOMINAL CRAMPING AFFECTING PREGNANCY: ICD-10-CM

## 2018-05-26 DIAGNOSIS — R10.2 PELVIC PAIN IN PREGNANCY, ANTEPARTUM, FIRST TRIMESTER: Primary | ICD-10-CM

## 2018-05-26 DIAGNOSIS — O26.891 PELVIC PAIN IN PREGNANCY, ANTEPARTUM, FIRST TRIMESTER: Primary | ICD-10-CM
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
## 2018-05-26 DIAGNOSIS — Z3A.25 25 WEEKS GESTATION OF PREGNANCY: ICD-10-CM

## 2018-05-26 DIAGNOSIS — O26.899 ABDOMINAL CRAMPING AFFECTING PREGNANCY: ICD-10-CM
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
## 2018-05-26 LAB
BASOPHILS # BLD AUTO: 0.02 K/UL
BASOPHILS NFR BLD: 0.1 %
DIFFERENTIAL METHOD: ABNORMAL
EOSINOPHIL # BLD AUTO: 0.2 K/UL
EOSINOPHIL NFR BLD: 1.1 %
ERYTHROCYTE [DISTWIDTH] IN BLOOD BY AUTOMATED COUNT: 12.8 %
HCT VFR BLD AUTO: 33.2 %
HGB BLD-MCNC: 11.3 G/DL
LYMPHOCYTES # BLD AUTO: 2.9 K/UL
LYMPHOCYTES NFR BLD: 19.1 %
MCH RBC QN AUTO: 32 PG
MCHC RBC AUTO-ENTMCNC: 34 G/DL
MCV RBC AUTO: 94 FL
MONOCYTES # BLD AUTO: 0.8 K/UL
MONOCYTES NFR BLD: 5.6 %
NEUTROPHILS # BLD AUTO: 10.9 K/UL
NEUTROPHILS NFR BLD: 73.5 %
PLATELET # BLD AUTO: 239 K/UL
PMV BLD AUTO: 9.8 FL
RBC # BLD AUTO: 3.53 M/UL
WBC # BLD AUTO: 14.9 K/UL

## 2018-05-26 PROCEDURE — 99285 EMERGENCY DEPT VISIT HI MDM: CPT | Mod: 25

## 2018-05-26 PROCEDURE — 85025 COMPLETE CBC W/AUTO DIFF WBC: CPT

## 2018-05-26 PROCEDURE — 59025 FETAL NON-STRESS TEST: CPT | Mod: 26,,, | Performed by: OBSTETRICS & GYNECOLOGY

## 2018-05-26 PROCEDURE — 59025 FETAL NON-STRESS TEST: CPT

## 2018-05-26 PROCEDURE — G0378 HOSPITAL OBSERVATION PER HR: HCPCS

## 2018-05-26 PROCEDURE — 99284 EMERGENCY DEPT VISIT MOD MDM: CPT | Mod: 25,,, | Performed by: OBSTETRICS & GYNECOLOGY

## 2018-05-26 PROCEDURE — 86850 RBC ANTIBODY SCREEN: CPT

## 2018-05-26 RX ORDER — PRENATAL WITH FERROUS FUM AND FOLIC ACID 3080; 920; 120; 400; 22; 1.84; 3; 20; 10; 1; 12; 200; 27; 25; 2 [IU]/1; [IU]/1; MG/1; [IU]/1; MG/1; MG/1; MG/1; MG/1; MG/1; MG/1; UG/1; MG/1; MG/1; MG/1; MG/1
1 TABLET ORAL DAILY
Status: DISCONTINUED | OUTPATIENT
Start: 2018-05-27 | End: 2018-05-27 | Stop reason: HOSPADM

## 2018-05-26 RX ORDER — DIPHENHYDRAMINE HCL 25 MG
25 CAPSULE ORAL EVERY 4 HOURS PRN
Status: DISCONTINUED | OUTPATIENT
Start: 2018-05-26 | End: 2018-05-27 | Stop reason: HOSPADM

## 2018-05-26 RX ORDER — ONDANSETRON 8 MG/1
8 TABLET, ORALLY DISINTEGRATING ORAL EVERY 8 HOURS PRN
Status: DISCONTINUED | OUTPATIENT
Start: 2018-05-26 | End: 2018-05-27 | Stop reason: HOSPADM

## 2018-05-26 RX ORDER — AMOXICILLIN 250 MG
1 CAPSULE ORAL NIGHTLY PRN
Status: DISCONTINUED | OUTPATIENT
Start: 2018-05-26 | End: 2018-05-27 | Stop reason: HOSPADM

## 2018-05-26 RX ORDER — DIPHENHYDRAMINE HYDROCHLORIDE 50 MG/ML
25 INJECTION INTRAMUSCULAR; INTRAVENOUS EVERY 4 HOURS PRN
Status: DISCONTINUED | OUTPATIENT
Start: 2018-05-26 | End: 2018-05-27 | Stop reason: HOSPADM

## 2018-05-26 RX ORDER — SIMETHICONE 80 MG
1 TABLET,CHEWABLE ORAL EVERY 6 HOURS PRN
Status: DISCONTINUED | OUTPATIENT
Start: 2018-05-26 | End: 2018-05-27 | Stop reason: HOSPADM

## 2018-05-26 NOTE — ED NOTES
"Pt arrived to OB ED with c/o fall around 1625. States she got lightheaded, tried to catch herself on a table, but the table did not hold her weight so she fell. States she did not directly hit her abdomen and fell "more on the right side." States she has had cramping about q 3 mins since the fall. Denies ELEAZAR SKAGGS. States +FM.  "

## 2018-05-26 NOTE — TELEPHONE ENCOUNTER
Returned call to pt.  Pt reports feeling dizzy and trying to catch herself on a tv tray but she ultimately fell and hit her abdomen.  Pt states that she is now feeling some abdominal tightening that she reports is not painful but she  is aware of it/.  Pt reports positive fetal movements.  Instructed pt to report to the Ob Ed for evaluation and provided pt with instructions on ED location.  Pt verbalized understanding and stated that she will come in for evaluation.

## 2018-05-27 VITALS
RESPIRATION RATE: 18 BRPM | HEART RATE: 75 BPM | SYSTOLIC BLOOD PRESSURE: 83 MMHG | OXYGEN SATURATION: 98 % | WEIGHT: 125 LBS | HEIGHT: 61 IN | BODY MASS INDEX: 23.6 KG/M2 | TEMPERATURE: 98 F | DIASTOLIC BLOOD PRESSURE: 48 MMHG

## 2018-05-27 LAB
ABO + RH BLD: NORMAL
BLD GP AB SCN CELLS X3 SERPL QL: NORMAL

## 2018-05-27 PROCEDURE — 59025 FETAL NON-STRESS TEST: CPT

## 2018-05-27 PROCEDURE — 63600175 PHARM REV CODE 636 W HCPCS: Performed by: STUDENT IN AN ORGANIZED HEALTH CARE EDUCATION/TRAINING PROGRAM

## 2018-05-27 PROCEDURE — 25000003 PHARM REV CODE 250: Performed by: STUDENT IN AN ORGANIZED HEALTH CARE EDUCATION/TRAINING PROGRAM

## 2018-05-27 PROCEDURE — G0378 HOSPITAL OBSERVATION PER HR: HCPCS

## 2018-05-27 RX ADMIN — PROMETHAZINE HYDROCHLORIDE 12.5 MG: 25 INJECTION INTRAMUSCULAR; INTRAVENOUS at 12:05

## 2018-05-27 RX ADMIN — PROMETHAZINE HYDROCHLORIDE 12.5 MG: 25 INJECTION INTRAMUSCULAR; INTRAVENOUS at 08:05

## 2018-05-27 RX ADMIN — PRENATAL VIT W/ FE FUMARATE-FA TAB 27-0.8 MG 1 TABLET: 27-0.8 TAB at 08:05

## 2018-05-27 RX ADMIN — SODIUM CHLORIDE, SODIUM LACTATE, POTASSIUM CHLORIDE, AND CALCIUM CHLORIDE 500 ML: .6; .31; .03; .02 INJECTION, SOLUTION INTRAVENOUS at 12:05

## 2018-05-27 RX ADMIN — PROMETHAZINE HYDROCHLORIDE 12.5 MG: 25 INJECTION INTRAMUSCULAR; INTRAVENOUS at 02:05

## 2018-05-27 NOTE — ASSESSMENT & PLAN NOTE
- patient fell and felt cramping following fall, found to have ctx in PAULINA on tocometry  - contractions continued into 4th hour of monitoring, becoming more frequent  - admit to antepartum for 24 hour observation  - Consents signed for Blood and Delivery  - Cervix 0/40/-3  - Monitor fetal status closely. NST reassuring on admit.   - Transverse presentation on bedside u/s  - last u/s: 20w ultrasound:  g 26%, posterior fundal placenta  - Contractions have ceased with reassuring monitoring. Will plan for d/c today.

## 2018-05-27 NOTE — H&P
"[facility]  Obstetrics  History & Physical    Patient Name: Ratna Hanson  MRN: [id number]  Admission Date: 2018  Primary Care Provider: Primary Doctor No    Subjective:     Principal Problem:Abdominal cramping affecting pregnancy    History of Present Illness:  Ratna Hanson is a 27 y.o. [id number] at 25w3d presents complaining of a fall at 4:20pm on Saturday. Patient states she was walking outside when she started to feel "fuzzy" and dizzy; she admits to not having eaten in several hours. She braced herself on a table; however the table fell over and she fell onto her side. She denies hitting her abdomen or head and denies LOC. She has been having mild cramping which has been increasing but is still mild. She denies vaginal bleeding and LOF and reports good fetal movement.   This IUP is complicated by h/o postpartum depression.      Obstetric History       T1      L1     SAB3   TAB0   Ectopic0   Multiple0   Live Births1       # Outcome Date GA Lbr Albaro/2nd Weight Sex Delivery Anes PTL Lv   5 Current            4 SAB 17 4w0d          3 SAB 17 4w0d          2 SAB 10/31/16 5w0d          1 Term 06 38w0d  2.948 kg (6 lb 8 oz) M Vag-Vacuum EPI  SOWMYA      Name: Gabo        Past Medical History:   Diagnosis Date    Breast disorder     Benign breast cysts; negative imaging    Postpartum depression     Used seroquil x ~ 1 month     Past Surgical History:   Procedure Laterality Date    BREAST SURGERY      augmentation bilateral    CHOLECYSTECTOMY      WISDOM TOOTH EXTRACTION         PTA Medications   Medication Sig    doxylamine-pyridoxine, vit B6, 10-10 mg TbEC Take 2 tablets by mouth every evening. May add one tablet in morning and one in afternoon.    ferrous sulfate 325 mg (65 mg iron) Tab tablet Take 325 mg by mouth daily with breakfast.    metoclopramide HCl (REGLAN) 10 MG tablet Take 1 tablet (10 mg total) by mouth every 6 (six) hours as needed.    PNV COMB " NO.59/IRON/FA/DHA (PRENATAL-DHA ORAL) Take by mouth.    PRENATAL VIT/IRON FUM/FOLIC AC (PRENATAL-FOLIC ACID ORAL) Take by mouth.    promethazine (PHENERGAN) 25 MG tablet Take 0.5 tablets (12.5 mg total) by mouth every 4 (four) hours as needed for Nausea.       Review of patient's allergies indicates:  No Known Allergies     Family History     Problem Relation (Age of Onset)    Heart attacks under age 50 Mother    Heart disease Mother, Paternal Grandfather    Hyperlipidemia Sister    No Known Problems Father, Paternal Grandmother, Maternal Grandmother        Social History Main Topics    Smoking status: Never Smoker    Smokeless tobacco: Never Used    Alcohol use Yes      Comment: none since pregnancy    Drug use: No    Sexual activity: Yes     Birth control/ protection: None      Comment: planning pregnancy     Review of Systems   Constitutional: Negative for chills and fever.   Respiratory: Negative for shortness of breath.    Cardiovascular: Negative for chest pain.   Gastrointestinal: Positive for abdominal pain (ctx). Negative for constipation, diarrhea, nausea and vomiting.   Genitourinary: Negative for vaginal bleeding and vaginal discharge.   Neurological: Negative for headaches.      Objective:     Vital Signs (Most Recent):  Temp: 98.4 °F (36.9 °C) (05/26/18 2225)  Pulse: 86 (05/26/18 2229)  Resp: 18 (05/26/18 2209)  BP: 133/82 (05/26/18 2209)  SpO2: 99 % (05/26/18 2229) Vital Signs (24h Range):  Temp:  [97.5 °F (36.4 °C)-98.4 °F (36.9 °C)] 98.4 °F (36.9 °C)  Pulse:  [78-89] 86  Resp:  [16-18] 18  SpO2:  [97 %-100 %] 99 %  BP: (119-133)/(74-82) 133/82     Weight: 56.7 kg (125 lb)  Body mass index is 23.62 kg/m².    FHT: 145, mod btbv, + accels (10x10), + occ variable decels. Reassuring for gestational age.  TOCO: Irregular, as often as q2-3 minutes    Physical Exam:   Constitutional: She is oriented to person, place, and time. She appears well-developed and well-nourished. No distress.    HENT:    Head: Normocephalic and atraumatic.    Eyes: EOM are normal.    Neck: Normal range of motion.    Cardiovascular: Normal rate.     Pulmonary/Chest: Effort normal. No respiratory distress.        Abdominal: Soft. There is no tenderness. There is no guarding.   No bruising on abdomen or side                 Neurological: She is alert and oriented to person, place, and time.    Skin: Skin is warm and dry. She is not diaphoretic.    Psychiatric: She has a normal mood and affect. Her behavior is normal. Judgment and thought content normal.       Cervix:  Dilation:  0  Effacement:  40  Station: -3  Presentation: transverse by bedside u/s     Significant Labs:  Lab Results   Component Value Date    GROUPTRH A POS 2018    HEPBSAG Negative 2018       I have personallly reviewed all pertinent lab results from the last 24 hours.    Assessment/Plan:     27 y.o. female  at 25w3d for:    * Abdominal cramping affecting pregnancy    - patient fell and felt cramping following fall, found to have ctx in PAULINA on tocometry  - contractions continued into 4th hour of monitoring, becoming more frequent  - admit to antepartum for 24 hour observation  - Consents signed for Blood and Delivery  - Cervix 0/40/-3  - Monitor fetal status closely. NST reassuring on admit.   - Transverse presentation on bedside u/s  - last u/s: 20w ultrasound:  g 26%, posterior fundal placenta            Sushma K Reddy, MD  Obstetrics  [facility]

## 2018-05-27 NOTE — PROGRESS NOTES
"[facility]  Obstetrics  Antepartum Progress Note    Patient Name: Ratna Hanson  MRN: [id number]  Admission Date: 2018  Hospital Length of Stay: 1 days  Attending Physician: Magali Gilbert MD  Primary Care Provider: Primary Doctor No    Subjective:     Principal Problem:Abdominal cramping affecting pregnancy    HPI:  Ratna Hanson is a 27 y.o. [id number] at 25w3d presents complaining of a fall at 4:20pm on Saturday. Patient states she was walking outside when she started to feel "fuzzy" and dizzy; she admits to not having eaten in several hours. She braced herself on a table; however the table fell over and she fell onto her side. She denies hitting her abdomen or head and denies LOC. She has been having mild cramping which has been increasing but is still mild. She denies vaginal bleeding and LOF and reports good fetal movement.   This IUP is complicated by h/o postpartum depression.    Hospital Course:  2018 - patient seen in [place] after fall, found to be andry with increasing frequency. Cervix closed. Admitted to antepartum for observation.    Obstetric HPI:  Pt reports uterine contractions have stopped. Feeling well overall this AM. Denies VB/LOF. IS feeling movement.     Objective:     Vital Signs (Most Recent):  Temp: 97.5 °F (36.4 °C) (18 0343)  Pulse: 85 (18 0403)  Resp: 16 (18 0343)  BP: (!) 109/59 (18 0343)  SpO2: 98 % (18 0403) Vital Signs (24h Range):  Temp:  [97.5 °F (36.4 °C)-98.4 °F (36.9 °C)] 97.5 °F (36.4 °C)  Pulse:  [78-89] 85  Resp:  [16-18] 16  SpO2:  [96 %-100 %] 98 %  BP: (109-133)/(59-82) 109/59     Weight: 56.7 kg (125 lb)  Body mass index is 23.62 kg/m².    FHT: 140 Cat 1 (reassuring)  TOCO:  none    No intake or output data in the 24 hours ending 18 0704      Significant Labs:  Recent Lab Results       18  215      Baso # 0.02     Basophil% 0.1     Differential Method Automated     Eos # 0.2     Eosinophil% 1.1  "    Gran # (ANC) 10.9(H)     Gran% 73.5(H)     Group & Rh A POS     Hematocrit 33.2(L)     Hemoglobin 11.3(L)     INDIRECT LAILA NEG     Lymph # 2.9     Lymph% 19.1     MCH 32.0(H)     MCHC 34.0     MCV 94     Mono # 0.8     Mono% 5.6     MPV 9.8     Platelets 239     RBC 3.53(L)     RDW 12.8     WBC 14.90(H)           Physical Exam:   Constitutional: She is oriented to person, place, and time. She appears well-developed and well-nourished. No distress.    HENT:   Head: Normocephalic and atraumatic.     Neck: Normal range of motion. Neck supple.    Cardiovascular: Normal rate and regular rhythm.     Pulmonary/Chest: Effort normal and breath sounds normal.        Abdominal: Soft. She exhibits no distension. There is no tenderness. There is no guarding.                 Neurological: She is alert and oriented to person, place, and time.    Skin: Skin is warm and dry.    Psychiatric: She has a normal mood and affect. Her behavior is normal.       Assessment/Plan:     27 y.o. female  at 25w4d for:    * Abdominal cramping affecting pregnancy    - patient fell and felt cramping following fall, found to have ctx in [place] on tocometry  - contractions continued into 4th hour of monitoring, becoming more frequent  - admit to antepartum for 24 hour observation  - Consents signed for Blood and Delivery  - Cervix 0/40/-3  - Monitor fetal status closely. NST reassuring on admit.   - Transverse presentation on bedside u/s  - last u/s: 20w ultrasound:  g 26%, posterior fundal placenta  - Contractions have ceased with reassuring monitoring. Will plan for d/c today.              Laury Stone MD  Obstetrics  [facility]

## 2018-05-27 NOTE — SUBJECTIVE & OBJECTIVE
Obstetric History       T1      L1     SAB3   TAB0   Ectopic0   Multiple0   Live Births1       # Outcome Date GA Lbr Albaro/2nd Weight Sex Delivery Anes PTL Lv   5 Current            4 SAB 17 4w0d          3 SAB 17 4w0d          2 SAB 10/31/16 5w0d          1 Term 06 38w0d  2.948 kg (6 lb 8 oz) M Vag-Vacuum EPI  SOWMYA      Name: Gabo        Past Medical History:   Diagnosis Date    Breast disorder     Benign breast cysts; negative imaging    Postpartum depression     Used seroquil x ~ 1 month     Past Surgical History:   Procedure Laterality Date    BREAST SURGERY      augmentation bilateral    CHOLECYSTECTOMY      WISDOM TOOTH EXTRACTION         PTA Medications   Medication Sig    doxylamine-pyridoxine, vit B6, 10-10 mg TbEC Take 2 tablets by mouth every evening. May add one tablet in morning and one in afternoon.    ferrous sulfate 325 mg (65 mg iron) Tab tablet Take 325 mg by mouth daily with breakfast.    metoclopramide HCl (REGLAN) 10 MG tablet Take 1 tablet (10 mg total) by mouth every 6 (six) hours as needed.    PNV COMB NO.59/IRON/FA/DHA (PRENATAL-DHA ORAL) Take by mouth.    PRENATAL VIT/IRON FUM/FOLIC AC (PRENATAL-FOLIC ACID ORAL) Take by mouth.    promethazine (PHENERGAN) 25 MG tablet Take 0.5 tablets (12.5 mg total) by mouth every 4 (four) hours as needed for Nausea.       Review of patient's allergies indicates:  No Known Allergies     Family History     Problem Relation (Age of Onset)    Heart attacks under age 50 Mother    Heart disease Mother, Paternal Grandfather    Hyperlipidemia Sister    No Known Problems Father, Paternal Grandmother, Maternal Grandmother        Social History Main Topics    Smoking status: Never Smoker    Smokeless tobacco: Never Used    Alcohol use Yes      Comment: none since pregnancy    Drug use: No    Sexual activity: Yes     Birth control/ protection: None      Comment: planning pregnancy     Review of Systems   Constitutional:  Negative for chills and fever.   Respiratory: Negative for shortness of breath.    Cardiovascular: Negative for chest pain.   Gastrointestinal: Positive for abdominal pain (ctx). Negative for constipation, diarrhea, nausea and vomiting.   Genitourinary: Negative for vaginal bleeding and vaginal discharge.   Neurological: Negative for headaches.      Objective:     Vital Signs (Most Recent):  Temp: 98.4 °F (36.9 °C) (05/26/18 2225)  Pulse: 86 (05/26/18 2229)  Resp: 18 (05/26/18 2209)  BP: 133/82 (05/26/18 2209)  SpO2: 99 % (05/26/18 2229) Vital Signs (24h Range):  Temp:  [97.5 °F (36.4 °C)-98.4 °F (36.9 °C)] 98.4 °F (36.9 °C)  Pulse:  [78-89] 86  Resp:  [16-18] 18  SpO2:  [97 %-100 %] 99 %  BP: (119-133)/(74-82) 133/82     Weight: 56.7 kg (125 lb)  Body mass index is 23.62 kg/m².    FHT: 145, mod btbv, + accels (10x10), + occ variable decels. Reassuring for gestational age.  TOCO: Irregular, as often as q2-3 minutes    Physical Exam:   Constitutional: She is oriented to person, place, and time. She appears well-developed and well-nourished. No distress.    HENT:   Head: Normocephalic and atraumatic.    Eyes: EOM are normal.    Neck: Normal range of motion.    Cardiovascular: Normal rate.     Pulmonary/Chest: Effort normal. No respiratory distress.        Abdominal: Soft. There is no tenderness. There is no guarding.   No bruising on abdomen or side                 Neurological: She is alert and oriented to person, place, and time.    Skin: Skin is warm and dry. She is not diaphoretic.    Psychiatric: She has a normal mood and affect. Her behavior is normal. Judgment and thought content normal.       Cervix:  Dilation:  0  Effacement:  40  Station: -3  Presentation: transverse by bedside u/s     Significant Labs:  Lab Results   Component Value Date    GROUPTRH A POS 01/23/2018    HEPBSAG Negative 01/23/2018       I have personallly reviewed all pertinent lab results from the last 24 hours.

## 2018-05-27 NOTE — HOSPITAL COURSE
05/26/2018 - patient seen in [place] after fall, found to be andry with increasing frequency. Cervix closed. Admitted to antepartum for observation.

## 2018-05-27 NOTE — PLAN OF CARE
Problem: Patient Care Overview  Goal: Plan of Care Review  Outcome: Ongoing (interventions implemented as appropriate)  Plan of care reviewed with Pt and family. Pt remains free from falls and injuries. TEDS/ SEDS in place. Pt reports positive fetal movement. Pt denies LOF and vaginal bleeding. Pt denies ctx at this time. Vital signs stable. No distress noted, will continue to monitor.

## 2018-05-27 NOTE — DISCHARGE INSTRUCTIONS
Call clinic [phone] or L & D after hours at [phone] for vaginal bleeding, leakage of fluids, contractions 4-5 in 2 hours, decreased fetal movements ( 10 kicks in 2 hours), headache not relieved by Tylenol, blurry vision, or temp of 100.4 or greater.  Begin doing fetal kick counts, at least 10 movements in 2 hours starting at 28 weeks gestation.  Keep next clinic appointment

## 2018-05-27 NOTE — ASSESSMENT & PLAN NOTE
- patient fell and felt cramping following fall, found to have ctx in [place] on tocometry  - contractions continued into 4th hour of monitoring, becoming more frequent  - admit to antepartum for 24 hour observation  - Consents signed for Blood and Delivery  - Cervix 0/40/-3  - Monitor fetal status closely. NST reassuring on admit.   - Transverse presentation on bedside u/s  - last u/s: 20w ultrasound:  g 26%, posterior fundal placenta

## 2018-05-27 NOTE — ED PROVIDER NOTES
"Encounter Date: 2018       History     Chief Complaint   Patient presents with    Contractions    Fall     Ratna Hanson is a 27 y.o. [id number] at 25w3d presents complaining of a fall at 4:20pm on Saturday. Patient states she was walking outside when she started to feel "fuzzy" and dizzy; she admits to not having eaten in several hours. She braced herself on a table; however the table fell over and she fell onto her side. She denies hitting her abdomen or head and denies LOC. She has been having mild cramping which has been increasing but is still mild. She denies vaginal bleeding and LOF and reports good fetal movement.   This IUP is complicated by h/o postpartum depression.            Review of patient's allergies indicates:  No Known Allergies  Past Medical History:   Diagnosis Date    Breast disorder     Benign breast cysts; negative imaging    Postpartum depression     Used seroquil x ~ 1 month     Past Surgical History:   Procedure Laterality Date    BREAST SURGERY      augmentation bilateral    CHOLECYSTECTOMY      WISDOM TOOTH EXTRACTION       Family History   Problem Relation Age of Onset    Heart disease Mother     Heart attacks under age 50 Mother     No Known Problems Father     Hyperlipidemia Sister     Heart disease Paternal Grandfather     No Known Problems Paternal Grandmother     No Known Problems Maternal Grandmother     Breast cancer Neg Hx     Ovarian cancer Neg Hx     Colon cancer Neg Hx      Social History   Substance Use Topics    Smoking status: Never Smoker    Smokeless tobacco: Never Used    Alcohol use Yes      Comment: none since pregnancy     Review of Systems   Respiratory: Negative for shortness of breath.    Cardiovascular: Negative for chest pain and palpitations.   Gastrointestinal: Positive for abdominal pain (cramping).   Genitourinary: Negative for vaginal bleeding and vaginal discharge.   Neurological: Positive for dizziness. Negative for syncope and " headaches.       Physical Exam     Initial Vitals   BP Pulse Resp Temp SpO2   05/26/18 1752 05/26/18 1750 05/26/18 1754 05/26/18 1754 05/26/18 1750   133/80 88 16 97.8 °F (36.6 °C) 100 %      MAP       05/26/18 1752       97.67         Physical Exam    Vitals reviewed.  Constitutional: She appears well-developed and well-nourished. She is not diaphoretic. No distress.   HENT:   Head: Normocephalic and atraumatic.   Eyes: EOM are normal.   Neck: Normal range of motion.   Cardiovascular: Normal rate.   Pulmonary/Chest: No respiratory distress.   Abdominal: There is no tenderness. There is no guarding.   Gravid   No bruising or any other abrasions on abdomen or side   Neurological: She is alert and oriented to person, place, and time.   Skin: Skin is warm and dry.   Psychiatric: She has a normal mood and affect. Her behavior is normal. Judgment and thought content normal.         ED Course   Fetal non-stress test  Date/Time: 5/27/2018 9:40 PM  Performed by: REDDY, SUSHMA K  Authorized by: JODY CAMARILLO     Nonstress Test:     Variability:  6-25 BPM    Decelerations:  None    Accelerations:  15 bpm    Acoustic Stimulator: No      Baseline:  140    Contractions:  Irregular    Contraction Frequency:  As often as every 2-3 min  Biophysical Profile:     Nonstress Test Interpretation: reactive      Overall Impression:  Reassuring      Labs Reviewed - No data to display          Medical Decision Making:   ED Management:  NST started on arrival. Reactive and reassuring. Four hour monitoring started (to be completed 4 hours after incident).  Patient having cramping, increasing in intensity as she is in the ED.  VSS. No physical exam findings that are concerning; however, patient is having contractions > 6 in an hour through the last hour of her monitoring. Pain is becoming worse.  Will admit for observation x 24 hours after fall.              Attending Attestation:   Physician Attestation Statement for Resident:  As the  supervising MD   Physician Attestation Statement: I have personally seen and examined this patient.   I agree with the above history. -:   As the supervising MD I agree with the above PE.    As the supervising MD I agree with the above treatment, course, plan, and disposition.   -:   NST  I independently reviewed the fetal non-stress test with the following interpretation:  140 BPM baseline  Variability: moderate  Accelerations: present  Decelerations: absent  Contractions: Q 2-5 min  Category 1    Clinical Interpretation: age appropriate    Patient evaluated and found to be stable, agree with resident's assessment of patient s/p fall onto abdomen now andry and at risk for placental abruption and plan to admit for close observation.  I was personally present during the critical portions of the procedure(s) performed by the resident and was immediately available in the ED to provide services and assistance as needed during the entire procedure.  I have reviewed the following: old records at this facility.                       Clinical Impression:   The primary encounter diagnosis was Pelvic pain in pregnancy, antepartum, first trimester. A diagnosis of Abdominal cramping affecting pregnancy was also pertinent to this visit.    Disposition:   Disposition: Discharged  Condition: Stable                        Sushma K Reddy, MD  Resident  05/27/18 9896       Leeann Chahal MD  05/28/18 2805

## 2018-05-27 NOTE — DISCHARGE SUMMARY
"[facility]  Obstetrics  Discharge Summary      Patient Name: Ratna Hanson  MRN: [id number]  Admission Date: 2018  Hospital Length of Stay: 1 days  Discharge Date and Time:  2018 3:03 PM  Attending Physician: Magali Gilbert MD   Discharging Provider: Colin Guzmán MD  Primary Care Provider: Primary Doctor No    HPI: Ratna Hanson is a 27 y.o. [id number] at 25w3d presents complaining of a fall at 4:20pm on Saturday. Patient states she was walking outside when she started to feel "fuzzy" and dizzy; she admits to not having eaten in several hours. She braced herself on a table; however the table fell over and she fell onto her side. She denies hitting her abdomen or head and denies LOC. She has been having mild cramping which has been increasing but is still mild. She denies vaginal bleeding and LOF and reports good fetal movement.   This IUP is complicated by h/o postpartum depression.    * No surgery found *     Hospital Course:   2018 - patient seen in [place] after fall, found to be andry with increasing frequency. Cervix closed. Admitted to antepartum for observation.        Final Active Diagnoses:    Diagnosis Date Noted POA    PRINCIPAL PROBLEM:  Abdominal cramping affecting pregnancy [O26.899, R10.9] 2018 Yes      Problems Resolved During this Admission:    Diagnosis Date Noted Date Resolved POA        Labs: All labs within the past 24 hours have been reviewed      Immunizations     None          This patient has no babies on file.  Pending Diagnostic Studies:     None          Discharged Condition: good    Disposition: Home or Self Care    Follow Up:  Follow-up Information     Schedule an appointment as soon as possible for a visit with Magali Gilbert MD.    Specialty:  Obstetrics and Gynecology  Contact information:  [address] [facility] [address] [phone]                 Patient Instructions:     Activity as tolerated     Notify your health " care provider if you experience any of the following:   Order Comments: Vaginal bleeding, leaking vaginal fluids, regular painful contractions and decreased fetal movements.     Notify your health care provider if you experience any of the following:  increased confusion or weakness     Notify your health care provider if you experience any of the following:  persistent dizziness, light-headedness, or visual disturbances     Notify your health care provider if you experience any of the following:  worsening rash     Notify your health care provider if you experience any of the following:  severe persistent headache     Notify your health care provider if you experience any of the following:  difficulty breathing or increased cough     Notify your health care provider if you experience any of the following:  redness, tenderness, or signs of infection (pain, swelling, redness, odor or green/yellow discharge around incision site)     Notify your health care provider if you experience any of the following:  severe uncontrolled pain     Notify your health care provider if you experience any of the following:  persistent nausea and vomiting or diarrhea     Notify your health care provider if you experience any of the following:  temperature >100.4       Medications:  Current Discharge Medication List      CONTINUE these medications which have NOT CHANGED    Details   doxylamine-pyridoxine, vit B6, 10-10 mg TbEC Take 2 tablets by mouth every evening. May add one tablet in morning and one in afternoon.  Qty: 30 tablet, Refills: 1      ferrous sulfate 325 mg (65 mg iron) Tab tablet Take 325 mg by mouth daily with breakfast.      metoclopramide HCl (REGLAN) 10 MG tablet Take 1 tablet (10 mg total) by mouth every 6 (six) hours as needed.  Qty: 15 tablet, Refills: 0      PNV COMB NO.59/IRON/FA/DHA (PRENATAL-DHA ORAL) Take by mouth.      PRENATAL VIT/IRON FUM/FOLIC AC (PRENATAL-FOLIC ACID ORAL) Take by mouth.      promethazine  (PHENERGAN) 25 MG tablet Take 0.5 tablets (12.5 mg total) by mouth every 4 (four) hours as needed for Nausea.  Qty: 30 tablet, Refills: 0    Associated Diagnoses: Prenatal care in first trimester; Nausea/vomiting in pregnancy             Colin Guzmán MD  Obstetrics  [facility]

## 2018-05-27 NOTE — ANESTHESIA PREPROCEDURE EVALUATION
Ratna Hanson is a 27 y.o. female  @25w3d who presents s/p fall. Pt. Is having some cramping, though does not appear to be in active labor at this time. Plan to admit for observation.     OB History    Para Term  AB Living   5 1 1   3 1   SAB TAB Ectopic Multiple Live Births   3       1      # Outcome Date GA Lbr Albaro/2nd Weight Sex Delivery Anes PTL Lv   5 Current            4 SAB 17 4w0d             Birth Comments: Faint + UPT (not confirmed with US), then started menses when expected   3 SAB 17 4w0d             Birth Comments: Faint + UPT (not confirmed with US), then started menses when expected   2 SAB 10/31/16 5w0d             Birth Comments: Several + UPTs then started menses at 1 week late then UPT negative   1 Term 06 38w0d  2.948 kg (6 lb 8 oz) M Vag-Vacuum EPI  SOWMYA      Birth Comments: VAVD for maternal exhaustion          Wt Readings from Last 1 Encounters:   18 2124 56.7 kg (125 lb)       BP Readings from Last 3 Encounters:   18 133/82   18 122/72   18 120/78       Patient Active Problem List   Diagnosis    Nausea and vomiting during pregnancy prior to 22 weeks gestation    History of 3 SABs in 2017, not medically confirmed    Pelvic pain in pregnancy, antepartum, first trimester    History of postpartum depression, currently pregnant    H/O delivery by vacuum extraction, currently pregnant    Pregnancy with one fetus, antepartum    Abdominal cramping affecting pregnancy       Past Surgical History:   Procedure Laterality Date    BREAST SURGERY      augmentation bilateral    CHOLECYSTECTOMY      WISDOM TOOTH EXTRACTION         Social History     Social History    Marital status:      Spouse name: N/A    Number of children: N/A    Years of education: N/A     Occupational History    Not on file.     Social History Main Topics    Smoking status: Never Smoker    Smokeless tobacco: Never Used    Alcohol use Yes       Comment: none since pregnancy    Drug use: No    Sexual activity: Yes     Birth control/ protection: None      Comment: planning pregnancy     Other Topics Concern    Not on file     Social History Narrative     Gene Owen is their first child together (his first child).      She has a 12 year old, Gabo         Chemistry        Component Value Date/Time     02/26/2018 2201    K 4.1 02/26/2018 2201     02/26/2018 2201    CO2 24 02/26/2018 2201    BUN 7 02/26/2018 2201    CREATININE 0.6 02/26/2018 2201    GLU 91 02/26/2018 2201        Component Value Date/Time    CALCIUM 9.4 02/26/2018 2201    ALKPHOS 148 (H) 02/26/2018 2201    AST 55 (H) 02/26/2018 2201    ALT 89 (H) 02/26/2018 2201    BILITOT 0.3 02/26/2018 2201    ESTGFRAFRICA >60 02/26/2018 2201    EGFRNONAA >60 02/26/2018 2201            Lab Results   Component Value Date    WBC 14.90 (H) 05/26/2018    HGB 11.3 (L) 05/26/2018    HCT 33.2 (L) 05/26/2018    MCV 94 05/26/2018     05/26/2018       No results for input(s): PT, INR, PROTIME, APTT in the last 72 hours.                  Anesthesia Evaluation    I have reviewed the Patient Summary Reports.    I have reviewed the Nursing Notes.   I have reviewed the Medications.     Review of Systems  Anesthesia Hx:  No problems with previous Anesthesia  History of prior surgery of interest to airway management or planning: Denies Family Hx of Anesthesia complications.   Denies Personal Hx of Anesthesia complications.   Hematology/Oncology:  Hematology Normal   Oncology Normal     EENT/Dental:EENT/Dental Normal   Cardiovascular:  Cardiovascular Normal Exercise tolerance: good     Pulmonary:  Pulmonary Normal    Renal/:  Renal/ Normal     Hepatic/GI:  Hepatic/GI Normal    Neurological:  Neurology Normal        Physical Exam  General:  Well nourished    Airway/Jaw/Neck:  Airway Findings: Mouth Opening: Normal Tongue: Normal  General Airway Assessment: Adult  Mallampati: II  Improves to  II with phonation.  TM Distance: Normal, at least 6 cm      Dental:  Dental Findings: In tact   Chest/Lungs:  Chest/Lungs Findings: Clear to auscultation, Normal Respiratory Rate     Heart/Vascular:  Heart Findings: Rate: Normal  Rhythm: Regular Rhythm  Sounds: Normal  Heart murmur: negative    Abdomen:  Abdomen Findings: Normal           Anesthesia Plan  Type of Anesthesia, risks & benefits discussed:  Anesthesia Type:  epidural, CSE, general, spinal  Patient's Preference:   Intra-op Monitoring Plan: standard ASA monitors  Intra-op Monitoring Plan Comments:   Post Op Pain Control Plan:   Post Op Pain Control Plan Comments:   Induction:   IV  Beta Blocker:  Patient is not currently on a Beta-Blocker (No further documentation required).       Informed Consent: Patient understands risks and agrees with Anesthesia plan.  Questions answered. Anesthesia consent signed with patient.  ASA Score: 2     Day of Surgery Review of History & Physical: I have interviewed and examined the patient. I have reviewed the patient's H&P dated:            Ready For Surgery From Anesthesia Perspective.

## 2018-05-27 NOTE — SUBJECTIVE & OBJECTIVE
Obstetric HPI:  Pt reports uterine contractions have stopped. Feeling well overall this AM. Denies VB/LOF. IS feeling movement.     Objective:     Vital Signs (Most Recent):  Temp: 97.5 °F (36.4 °C) (05/27/18 0343)  Pulse: 85 (05/27/18 0403)  Resp: 16 (05/27/18 0343)  BP: (!) 109/59 (05/27/18 0343)  SpO2: 98 % (05/27/18 0403) Vital Signs (24h Range):  Temp:  [97.5 °F (36.4 °C)-98.4 °F (36.9 °C)] 97.5 °F (36.4 °C)  Pulse:  [78-89] 85  Resp:  [16-18] 16  SpO2:  [96 %-100 %] 98 %  BP: (109-133)/(59-82) 109/59     Weight: 56.7 kg (125 lb)  Body mass index is 23.62 kg/m².    FHT: 140 Cat 1 (reassuring)  TOCO:  none    No intake or output data in the 24 hours ending 05/27/18 0704      Significant Labs:  Recent Lab Results       05/26/18 2155      Baso # 0.02     Basophil% 0.1     Differential Method Automated     Eos # 0.2     Eosinophil% 1.1     Gran # (ANC) 10.9(H)     Gran% 73.5(H)     Group & Rh A POS     Hematocrit 33.2(L)     Hemoglobin 11.3(L)     INDIRECT LAILA NEG     Lymph # 2.9     Lymph% 19.1     MCH 32.0(H)     MCHC 34.0     MCV 94     Mono # 0.8     Mono% 5.6     MPV 9.8     Platelets 239     RBC 3.53(L)     RDW 12.8     WBC 14.90(H)           Physical Exam:   Constitutional: She is oriented to person, place, and time. She appears well-developed and well-nourished. No distress.    HENT:   Head: Normocephalic and atraumatic.     Neck: Normal range of motion. Neck supple.    Cardiovascular: Normal rate and regular rhythm.     Pulmonary/Chest: Effort normal and breath sounds normal.        Abdominal: Soft. She exhibits no distension. There is no tenderness. There is no guarding.                 Neurological: She is alert and oriented to person, place, and time.    Skin: Skin is warm and dry.    Psychiatric: She has a normal mood and affect. Her behavior is normal.

## 2018-05-27 NOTE — NURSING
Pt discharged home with spouse.  Pt ambulated independently off of unit.  VSS upon discharge.  Pt denies pain, ctx, LOF, vaginal bleeding.

## 2018-05-30 DIAGNOSIS — O21.9 NAUSEA/VOMITING IN PREGNANCY: ICD-10-CM

## 2018-05-30 DIAGNOSIS — Z34.91 PRENATAL CARE IN FIRST TRIMESTER: ICD-10-CM

## 2018-05-30 RX ORDER — PROMETHAZINE HYDROCHLORIDE 25 MG/1
12.5 TABLET ORAL EVERY 4 HOURS PRN
Qty: 30 TABLET | Refills: 0 | Status: SHIPPED | OUTPATIENT
Start: 2018-05-30 | End: 2018-06-27 | Stop reason: SDUPTHER

## 2018-06-13 ENCOUNTER — ROUTINE PRENATAL (OUTPATIENT)
Dept: OBSTETRICS AND GYNECOLOGY | Facility: CLINIC | Age: 28
End: 2018-06-13
Payer: COMMERCIAL

## 2018-06-13 ENCOUNTER — CLINICAL SUPPORT (OUTPATIENT)
Dept: OBSTETRICS AND GYNECOLOGY | Facility: CLINIC | Age: 28
End: 2018-06-13
Payer: COMMERCIAL

## 2018-06-13 ENCOUNTER — LAB VISIT (OUTPATIENT)
Dept: LAB | Facility: OTHER | Age: 28
End: 2018-06-13
Payer: COMMERCIAL

## 2018-06-13 VITALS — WEIGHT: 132.5 LBS | DIASTOLIC BLOOD PRESSURE: 76 MMHG | BODY MASS INDEX: 25.03 KG/M2 | SYSTOLIC BLOOD PRESSURE: 120 MMHG

## 2018-06-13 DIAGNOSIS — Z34.92 PRENATAL CARE IN SECOND TRIMESTER: ICD-10-CM

## 2018-06-13 DIAGNOSIS — Z34.93 PREGNANT AND NOT YET DELIVERED IN THIRD TRIMESTER: Primary | ICD-10-CM

## 2018-06-13 LAB
BASOPHILS # BLD AUTO: 0.02 K/UL
BASOPHILS NFR BLD: 0.2 %
DIFFERENTIAL METHOD: ABNORMAL
EOSINOPHIL # BLD AUTO: 0.1 K/UL
EOSINOPHIL NFR BLD: 1.2 %
ERYTHROCYTE [DISTWIDTH] IN BLOOD BY AUTOMATED COUNT: 12.6 %
GLUCOSE SERPL-MCNC: 139 MG/DL
HCT VFR BLD AUTO: 30.5 %
HGB BLD-MCNC: 10.2 G/DL
LYMPHOCYTES # BLD AUTO: 1.9 K/UL
LYMPHOCYTES NFR BLD: 16.5 %
MCH RBC QN AUTO: 31.6 PG
MCHC RBC AUTO-ENTMCNC: 33.4 G/DL
MCV RBC AUTO: 94 FL
MONOCYTES # BLD AUTO: 0.5 K/UL
MONOCYTES NFR BLD: 4 %
NEUTROPHILS # BLD AUTO: 9 K/UL
NEUTROPHILS NFR BLD: 77.6 %
PLATELET # BLD AUTO: 269 K/UL
PMV BLD AUTO: 10.1 FL
RBC # BLD AUTO: 3.23 M/UL
WBC # BLD AUTO: 11.63 K/UL

## 2018-06-13 PROCEDURE — 36415 COLL VENOUS BLD VENIPUNCTURE: CPT

## 2018-06-13 PROCEDURE — 90715 TDAP VACCINE 7 YRS/> IM: CPT | Mod: S$GLB,,, | Performed by: ADVANCED PRACTICE MIDWIFE

## 2018-06-13 PROCEDURE — 85025 COMPLETE CBC W/AUTO DIFF WBC: CPT

## 2018-06-13 PROCEDURE — 90471 IMMUNIZATION ADMIN: CPT | Mod: S$GLB,,, | Performed by: ADVANCED PRACTICE MIDWIFE

## 2018-06-13 PROCEDURE — 99999 PR PBB SHADOW E&M-EST. PATIENT-LVL II: CPT | Mod: PBBFAC,,, | Performed by: ADVANCED PRACTICE MIDWIFE

## 2018-06-13 PROCEDURE — 0502F SUBSEQUENT PRENATAL CARE: CPT | Mod: S$GLB,,, | Performed by: ADVANCED PRACTICE MIDWIFE

## 2018-06-13 PROCEDURE — 82950 GLUCOSE TEST: CPT

## 2018-06-13 PROCEDURE — 99999 PR PBB SHADOW E&M-EST. PATIENT-LVL I: CPT | Mod: PBBFAC,,,

## 2018-06-13 NOTE — PROGRESS NOTES
Admin T-Dap inj into LUE. Pt reports no pain at this time. Pt instructed to remain in clinic for 15 mins following the injection. No adverse reaction noted.

## 2018-06-13 NOTE — PROGRESS NOTES
27 y.o. female  at 28w0d by  LMP c/w 7 wk US  Reports + FM, denies VB, LOF or CTX  Doing well without concerns.   Pt just drank glucola and is having 28 wk labs drawn today.  She is A POS - Rhogam not indicated.   Pt states she's going to have TDAP today.   TW lbs with prepregnancy BMI 19. Please discuss weight gain parameters again at next visit.   Reviewed warning signs, normal FKCs,  labor precautions and how/when to call.  RTC x 2 wks, call or present sooner prn.   Birth Center Risk Assessment: 0- Meets birth center guidelines    0- CNM management in ABC  1- CNM management on L&D  2- Consultation with OB to develop  plan of care  3- Collaborative CNM/OB management with delivery on L&D  4- Referral of care to MD arreguin  5- Permanent referral of care to MD

## 2018-06-27 ENCOUNTER — ROUTINE PRENATAL (OUTPATIENT)
Dept: OBSTETRICS AND GYNECOLOGY | Facility: CLINIC | Age: 28
End: 2018-06-27
Payer: COMMERCIAL

## 2018-06-27 VITALS — BODY MASS INDEX: 25.13 KG/M2 | SYSTOLIC BLOOD PRESSURE: 112 MMHG | WEIGHT: 133 LBS | DIASTOLIC BLOOD PRESSURE: 72 MMHG

## 2018-06-27 DIAGNOSIS — Z34.91 PRENATAL CARE IN FIRST TRIMESTER: ICD-10-CM

## 2018-06-27 DIAGNOSIS — O21.9 NAUSEA/VOMITING IN PREGNANCY: ICD-10-CM

## 2018-06-27 PROCEDURE — 0502F SUBSEQUENT PRENATAL CARE: CPT | Mod: S$GLB,,, | Performed by: ADVANCED PRACTICE MIDWIFE

## 2018-06-27 PROCEDURE — 99999 PR PBB SHADOW E&M-EST. PATIENT-LVL II: CPT | Mod: PBBFAC,,, | Performed by: ADVANCED PRACTICE MIDWIFE

## 2018-06-27 RX ORDER — PROMETHAZINE HYDROCHLORIDE 25 MG/1
12.5 TABLET ORAL EVERY 4 HOURS PRN
Qty: 30 TABLET | Refills: 0 | Status: SHIPPED | OUTPATIENT
Start: 2018-06-27 | End: 2018-07-12 | Stop reason: SDUPTHER

## 2018-06-27 NOTE — PROGRESS NOTES
27 y.o. female  at 30w0d  Unaccompanied today  Reports + FM, denies VB, LOF or CTX  Continued n/v; refill phenergan provided; however still gaining weight and tolerating regular diet  TW lbs for pre preg BMI of 19  Slight anemia; continues Fe  Reviewed 28wk lab results (A POS)  S/p Tdap  Reviewed warning signs, normal FKCs,  labor precautions and how/when to call.  RTC x 2 wks, call or present sooner prn.     Birth Center Risk Assessment: 0  0- CNM management in ABC  1- CNM management on L&D  2- Consultation with OB to develop plan of care  3- Collaborative CNM/OB management with delivery on L&D  4- Referral or transfer of care to MD

## 2018-07-12 ENCOUNTER — ROUTINE PRENATAL (OUTPATIENT)
Dept: OBSTETRICS AND GYNECOLOGY | Facility: CLINIC | Age: 28
End: 2018-07-12
Payer: COMMERCIAL

## 2018-07-12 ENCOUNTER — PATIENT MESSAGE (OUTPATIENT)
Dept: OBSTETRICS AND GYNECOLOGY | Facility: CLINIC | Age: 28
End: 2018-07-12

## 2018-07-12 VITALS
BODY MASS INDEX: 25.91 KG/M2 | DIASTOLIC BLOOD PRESSURE: 68 MMHG | WEIGHT: 137.13 LBS | SYSTOLIC BLOOD PRESSURE: 124 MMHG

## 2018-07-12 DIAGNOSIS — B96.89 BACTERIAL VAGINOSIS: ICD-10-CM

## 2018-07-12 DIAGNOSIS — O23.593 VAGINITIS AFFECTING PREGNANCY IN THIRD TRIMESTER, ANTEPARTUM: Primary | ICD-10-CM

## 2018-07-12 DIAGNOSIS — Z34.91 PRENATAL CARE IN FIRST TRIMESTER: ICD-10-CM

## 2018-07-12 DIAGNOSIS — O21.9 NAUSEA/VOMITING IN PREGNANCY: ICD-10-CM

## 2018-07-12 DIAGNOSIS — N76.0 BACTERIAL VAGINOSIS: ICD-10-CM

## 2018-07-12 DIAGNOSIS — O23.43 URINARY TRACT INFECTION AFFECTING CARE OF MOTHER, ANTEPARTUM, THIRD TRIMESTER: ICD-10-CM

## 2018-07-12 LAB
CANDIDA RRNA VAG QL PROBE: NEGATIVE
G VAGINALIS RRNA GENITAL QL PROBE: POSITIVE
T VAGINALIS RRNA GENITAL QL PROBE: NEGATIVE

## 2018-07-12 PROCEDURE — 87660 TRICHOMONAS VAGIN DIR PROBE: CPT

## 2018-07-12 PROCEDURE — 87086 URINE CULTURE/COLONY COUNT: CPT

## 2018-07-12 PROCEDURE — 99999 PR PBB SHADOW E&M-EST. PATIENT-LVL III: CPT | Mod: PBBFAC,,, | Performed by: ADVANCED PRACTICE MIDWIFE

## 2018-07-12 PROCEDURE — 0502F SUBSEQUENT PRENATAL CARE: CPT | Mod: S$GLB,,, | Performed by: ADVANCED PRACTICE MIDWIFE

## 2018-07-12 RX ORDER — PROMETHAZINE HYDROCHLORIDE 25 MG/1
12.5 TABLET ORAL EVERY 4 HOURS PRN
Qty: 30 TABLET | Refills: 0 | Status: ON HOLD | OUTPATIENT
Start: 2018-07-12 | End: 2018-09-04 | Stop reason: HOSPADM

## 2018-07-12 RX ORDER — NITROFURANTOIN 25; 75 MG/1; MG/1
100 CAPSULE ORAL 2 TIMES DAILY
Qty: 10 CAPSULE | Refills: 0 | Status: SHIPPED | OUTPATIENT
Start: 2018-07-12 | End: 2018-07-19

## 2018-07-12 RX ORDER — PROMETHAZINE HYDROCHLORIDE 25 MG/1
12.5 TABLET ORAL EVERY 4 HOURS PRN
Qty: 30 TABLET | Refills: 0 | Status: SHIPPED | OUTPATIENT
Start: 2018-07-12 | End: 2018-07-12 | Stop reason: SDUPTHER

## 2018-07-12 NOTE — PROGRESS NOTES
"27 y.o. female  at 32w1d   Reports + FM, denies VB, LOF or CTX  Pt here because she states she's having menstrual like cramps and her urine "is a funny odor x 1 week". She denies fever, chills and lower back pain. She also is concerned about vaginal discharge smelling "yeasty". No unusual discharge noted on exam today. She denies burning, irritation or abnormal appearance of vaginal discharge.  Urine culture and Affirm sent.    + nitrites and ++ leukocytes on urine dip.   Neg CVA tenderness.   Treating empirically today. Abx sent to pharmacy in [place]. Pt agrees to rtc with increased cramping, fever, pain in lower back or if condition is not improving.  Pt continues to have nausea with vomiting (roughly twice a day) but is able to tolerate regular diet and is gaining weight.   Pt has cough x 6 wks and hx of recent sinus congestion. She was seen at PCP and declined chest x-ray. She was told to simply take cough drops and rest. Cough persists. She denies fever, night sweats or malaise. Encouraged pt to rest and attempt to bolster immune system. Pt to rtc with fever or worsening symptoms.   TW lbs   (A POS)  Mild Anemia - continue fe   Reviewed warning signs, normal FKCs,  labor precautions and how/when to call.  RTC x 2 wks, call or present sooner prn.     Birth Center Risk Assessment: 0- Meets birth center guidelines    0- CNM management in ABC  1- CNM management on L&D  2- Consultation with OB to develop  plan of care  3- Collaborative CNM/OB management with delivery on L&D  4- Permanent referral of care to MD  "

## 2018-07-13 ENCOUNTER — TELEPHONE (OUTPATIENT)
Dept: OBSTETRICS AND GYNECOLOGY | Facility: CLINIC | Age: 28
End: 2018-07-13

## 2018-07-13 LAB
BACTERIA UR CULT: NORMAL
BACTERIA UR CULT: NORMAL

## 2018-07-13 RX ORDER — METRONIDAZOLE 7.5 MG/G
1 GEL VAGINAL NIGHTLY
Qty: 70 G | Refills: 0 | Status: SHIPPED | OUTPATIENT
Start: 2018-07-13 | End: 2018-07-18

## 2018-07-13 NOTE — TELEPHONE ENCOUNTER
Called pt and lvmm to return call.     + BV     Plan: Metronidazole 500 mg twice daily for 7 days

## 2018-07-13 NOTE — TELEPHONE ENCOUNTER
Problem: Goal Outcome Summary  Goal: Goal Outcome Summary  Outcome: Adequate for Discharge Date Met:  07/28/17  Data: Vital signs stable, assessments within normal limits.   Feeding well, tolerated and retained.   Cord drying, no signs of infection noted.   Baby voiding and stooling.   No evidence of significant jaundice, mother instructed of signs/symptoms to look for and report per discharge instructions.   Discharge outcomes on care plan met.   No apparent pain.  Action: Review of care plan, teaching, and discharge instructions done with mother. Infant identification with ID bands done, mother verification with signature obtained. Metabolic and hearing screen completed.  Response: Mother states understanding and comfort with infant cares and feeding. All questions about baby care addressed. Baby discharged with parents at 1430.       Spoke to Flag Patel CNMyl called into pharmacy for +BV. Pt notified of new script.  pt states she had mild spotting when she wiped post her exam yesterday. Pt encouraged to watch and if gets worse to call and let CNM know.  Pt encouraged to take antibiotic prescribed for UTI as well.  Pt denies other questions.       ----- Message from Kishor Forde sent at 7/13/2018 10:07 AM CDT -----  Contact: pt            Name of Who is Calling: pt      What is the request in detail: is returning a call      Can the clinic reply by [facility]: no      What Number [facility] Back [facility]: [phone]

## 2018-07-25 ENCOUNTER — PATIENT MESSAGE (OUTPATIENT)
Dept: OBSTETRICS AND GYNECOLOGY | Facility: CLINIC | Age: 28
End: 2018-07-25

## 2018-07-26 ENCOUNTER — ROUTINE PRENATAL (OUTPATIENT)
Dept: OBSTETRICS AND GYNECOLOGY | Facility: CLINIC | Age: 28
End: 2018-07-26
Payer: COMMERCIAL

## 2018-07-26 ENCOUNTER — TELEPHONE (OUTPATIENT)
Dept: OBSTETRICS AND GYNECOLOGY | Facility: CLINIC | Age: 28
End: 2018-07-26

## 2018-07-26 ENCOUNTER — LAB VISIT (OUTPATIENT)
Dept: LAB | Facility: OTHER | Age: 28
End: 2018-07-26
Payer: COMMERCIAL

## 2018-07-26 VITALS
BODY MASS INDEX: 26.12 KG/M2 | SYSTOLIC BLOOD PRESSURE: 132 MMHG | WEIGHT: 138.25 LBS | DIASTOLIC BLOOD PRESSURE: 84 MMHG

## 2018-07-26 DIAGNOSIS — H53.9 VISUAL CHANGES: ICD-10-CM

## 2018-07-26 DIAGNOSIS — Z34.93 PREGNANT AND NOT YET DELIVERED IN THIRD TRIMESTER: Primary | ICD-10-CM

## 2018-07-26 DIAGNOSIS — Z34.93 PREGNANT AND NOT YET DELIVERED IN THIRD TRIMESTER: ICD-10-CM

## 2018-07-26 LAB
ALBUMIN SERPL BCP-MCNC: 3.2 G/DL
ALP SERPL-CCNC: 114 U/L
ALT SERPL W/O P-5'-P-CCNC: 9 U/L
ANION GAP SERPL CALC-SCNC: 9 MMOL/L
AST SERPL-CCNC: 15 U/L
BILIRUB SERPL-MCNC: 0.6 MG/DL
BUN SERPL-MCNC: 10 MG/DL
CALCIUM SERPL-MCNC: 9.7 MG/DL
CHLORIDE SERPL-SCNC: 102 MMOL/L
CO2 SERPL-SCNC: 23 MMOL/L
CREAT SERPL-MCNC: 0.6 MG/DL
EST. GFR  (AFRICAN AMERICAN): >60 ML/MIN/1.73 M^2
EST. GFR  (NON AFRICAN AMERICAN): >60 ML/MIN/1.73 M^2
GLUCOSE SERPL-MCNC: 83 MG/DL
POTASSIUM SERPL-SCNC: 4.5 MMOL/L
PROT SERPL-MCNC: 7.4 G/DL
SODIUM SERPL-SCNC: 134 MMOL/L

## 2018-07-26 PROCEDURE — 87086 URINE CULTURE/COLONY COUNT: CPT

## 2018-07-26 PROCEDURE — 99999 PR PBB SHADOW E&M-EST. PATIENT-LVL III: CPT | Mod: PBBFAC,,, | Performed by: ADVANCED PRACTICE MIDWIFE

## 2018-07-26 PROCEDURE — 86592 SYPHILIS TEST NON-TREP QUAL: CPT

## 2018-07-26 PROCEDURE — 36415 COLL VENOUS BLD VENIPUNCTURE: CPT

## 2018-07-26 PROCEDURE — 0502F SUBSEQUENT PRENATAL CARE: CPT | Mod: S$GLB,,, | Performed by: ADVANCED PRACTICE MIDWIFE

## 2018-07-26 PROCEDURE — 80053 COMPREHEN METABOLIC PANEL: CPT

## 2018-07-26 PROCEDURE — 86703 HIV-1/HIV-2 1 RESULT ANTBDY: CPT

## 2018-07-26 RX ORDER — DIPHENHYDRAMINE HCL 50 MG
50 CAPSULE ORAL EVERY 6 HOURS PRN
COMMUNITY
End: 2018-10-04

## 2018-07-26 NOTE — PROGRESS NOTES
"27 y.o. female  at 34w1d   Reports + FM, denies VB, LOF or CTX  Pt states she had bad nausea and vomiting last week but it has improved in last couple of days. She reports she has vomited once each day and is mostly able to tolerate a normal diet and fluids. She reports she isn't drinking as much water as she should and knows she is dehydrated. She has had some spells of dizziness and occasionally "sees spots" but she thinks it may be r/t dehydration. Urine is very concentrated. She states she has RUQ pain that is "sharp" and "comes and goes" and she doesn't think it's r/t baby's movements. She is very worried and states two of her friends have had pre-eclampsia and warned her of the symptoms. She denies headache. Ordered labs for pt reassurance and to rule out as cause for symptoms.   TW lbs   Reviewed 28wk lab results (A POS)  Reviewed iron rich foods - pt taking fe but contributing to nausea. Recommend pt make a list of iron rich foods and choose one to eat at each meal instead.   Reviewed upcoming 36wk labs - drawing HIV/RPR today with pre-e labs.   Reviewed warning signs, normal FKCs,  labor precautions and how/when to call.  RTC x 2 wks, call or present sooner prn.     Birth Center Risk Assessment: 0- Meets birth center guidelines    0- CNM management in ABC  1- CNM management on L&D  2- Consultation with OB to develop  plan of care  3- Collaborative CNM/OB management with delivery on L&D  4- Permanent referral of care to MD  "

## 2018-07-27 LAB
BACTERIA UR CULT: NORMAL
HIV 1+2 AB+HIV1 P24 AG SERPL QL IA: NEGATIVE
RPR SER QL: NORMAL

## 2018-07-27 NOTE — TELEPHONE ENCOUNTER
"Reviewed labs with pt. She is relieved and states she "feels better". Reviewed warning s/s and reassured pt she could call or come in at any time.     Amanda Bonds CNM     "

## 2018-08-07 ENCOUNTER — ROUTINE PRENATAL (OUTPATIENT)
Dept: OBSTETRICS AND GYNECOLOGY | Facility: CLINIC | Age: 28
End: 2018-08-07
Payer: COMMERCIAL

## 2018-08-07 VITALS
BODY MASS INDEX: 26.53 KG/M2 | SYSTOLIC BLOOD PRESSURE: 106 MMHG | DIASTOLIC BLOOD PRESSURE: 70 MMHG | WEIGHT: 140.44 LBS

## 2018-08-07 DIAGNOSIS — Z34.93 PREGNANT AND NOT YET DELIVERED IN THIRD TRIMESTER: Primary | ICD-10-CM

## 2018-08-07 PROCEDURE — 87081 CULTURE SCREEN ONLY: CPT

## 2018-08-07 PROCEDURE — 0502F SUBSEQUENT PRENATAL CARE: CPT | Mod: S$GLB,,, | Performed by: ADVANCED PRACTICE MIDWIFE

## 2018-08-07 PROCEDURE — 87147 CULTURE TYPE IMMUNOLOGIC: CPT

## 2018-08-07 PROCEDURE — 99999 PR PBB SHADOW E&M-EST. PATIENT-LVL II: CPT | Mod: PBBFAC,,, | Performed by: ADVANCED PRACTICE MIDWIFE

## 2018-08-07 PROCEDURE — 87184 SC STD DISK METHOD PER PLATE: CPT

## 2018-08-07 NOTE — PROGRESS NOTES
27 y.o. female  at 35w6d   Reports + FM, denies VB, LOF or regular CTX  Doing well without concerns. Pt feeling much better today and hasn't used Phenergan since last visit.  TW lbs   GBS collected today   Reviewed warning signs, normal FKCs, labor precautions and how/when to call.  RTC x 1 wk, call or present sooner prn.     Birth Center Risk Assessment: 0- Meets birth center guidelines    0- CNM management in ABC  1- CNM management on L&D  2- Consultation with OB to develop  plan of care  3- Collaborative CNM/OB management with delivery on L&D  4- Permanent referral of care to MD

## 2018-08-09 PROBLEM — O98.819 GROUP B STREPTOCOCCAL INFECTION DURING PREGNANCY: Status: ACTIVE | Noted: 2018-08-09

## 2018-08-09 PROBLEM — B95.1 GROUP B STREPTOCOCCAL INFECTION DURING PREGNANCY: Status: ACTIVE | Noted: 2018-08-09

## 2018-08-10 LAB — BACTERIA SPEC AEROBE CULT: NORMAL

## 2018-08-11 ENCOUNTER — PATIENT MESSAGE (OUTPATIENT)
Dept: OBSTETRICS AND GYNECOLOGY | Facility: CLINIC | Age: 28
End: 2018-08-11

## 2018-08-15 ENCOUNTER — ROUTINE PRENATAL (OUTPATIENT)
Dept: OBSTETRICS AND GYNECOLOGY | Facility: CLINIC | Age: 28
End: 2018-08-15
Payer: COMMERCIAL

## 2018-08-15 VITALS
DIASTOLIC BLOOD PRESSURE: 72 MMHG | WEIGHT: 140.13 LBS | SYSTOLIC BLOOD PRESSURE: 110 MMHG | BODY MASS INDEX: 26.47 KG/M2

## 2018-08-15 DIAGNOSIS — Z34.93 PRENATAL CARE IN THIRD TRIMESTER: Primary | ICD-10-CM

## 2018-08-15 PROCEDURE — 99999 PR PBB SHADOW E&M-EST. PATIENT-LVL III: CPT | Mod: PBBFAC,,, | Performed by: ADVANCED PRACTICE MIDWIFE

## 2018-08-15 PROCEDURE — 0502F SUBSEQUENT PRENATAL CARE: CPT | Mod: S$GLB,,, | Performed by: ADVANCED PRACTICE MIDWIFE

## 2018-08-15 NOTE — PROGRESS NOTES
27 y.o. female  at 37w0d  Unaccompanied today  Reports + FM, denies VB, LOF or regular CTX  Doing well without OB concerns   TW lbs for pre preg BMI of 19  Delivery consents already signed  Reviewed GBS POS and need for intrapartum abx  Reviewed repeat HIV/RPR neg/neg  Reviewed warning signs, normal FKCs, labor precautions and how/when to call.  RTC x 1 wks, call or present sooner prn.   Birth Center Risk Assessment: 0  0- CNM management in ABC  1- CNM management on L&D  2- Consultation with OB to develop plan of care  3- Collaborative CNM/OB management with delivery on L&D  4- Referral or transfer of care to MD

## 2018-08-18 ENCOUNTER — PATIENT MESSAGE (OUTPATIENT)
Dept: OBSTETRICS AND GYNECOLOGY | Facility: CLINIC | Age: 28
End: 2018-08-18

## 2018-08-20 ENCOUNTER — ROUTINE PRENATAL (OUTPATIENT)
Dept: OBSTETRICS AND GYNECOLOGY | Facility: CLINIC | Age: 28
End: 2018-08-20
Payer: COMMERCIAL

## 2018-08-20 ENCOUNTER — HOSPITAL ENCOUNTER (OUTPATIENT)
Dept: PERINATAL CARE | Facility: OTHER | Age: 28
Discharge: HOME | End: 2018-08-20
Attending: ADVANCED PRACTICE MIDWIFE
Payer: COMMERCIAL

## 2018-08-20 VITALS
WEIGHT: 139.56 LBS | BODY MASS INDEX: 26.37 KG/M2 | DIASTOLIC BLOOD PRESSURE: 68 MMHG | SYSTOLIC BLOOD PRESSURE: 116 MMHG

## 2018-08-20 DIAGNOSIS — Z34.93 PRENATAL CARE IN THIRD TRIMESTER: ICD-10-CM

## 2018-08-20 DIAGNOSIS — L29.9 ITCHING: Primary | ICD-10-CM

## 2018-08-20 DIAGNOSIS — L29.9 ITCHING: ICD-10-CM

## 2018-08-20 PROCEDURE — 99999 PR PBB SHADOW E&M-EST. PATIENT-LVL II: CPT | Mod: PBBFAC,,, | Performed by: ADVANCED PRACTICE MIDWIFE

## 2018-08-20 PROCEDURE — 0502F SUBSEQUENT PRENATAL CARE: CPT | Mod: S$GLB,,, | Performed by: ADVANCED PRACTICE MIDWIFE

## 2018-08-20 PROCEDURE — 59025 FETAL NON-STRESS TEST: CPT | Mod: 26,,, | Performed by: OBSTETRICS & GYNECOLOGY

## 2018-08-20 PROCEDURE — 76815 OB US LIMITED FETUS(S): CPT | Mod: 26,,, | Performed by: OBSTETRICS & GYNECOLOGY

## 2018-08-20 PROCEDURE — 59025 FETAL NON-STRESS TEST: CPT

## 2018-08-20 PROCEDURE — 76815 OB US LIMITED FETUS(S): CPT

## 2018-08-20 RX ORDER — URSODIOL 250 MG/1
250 TABLET, FILM COATED ORAL
Qty: 90 TABLET | Refills: 0 | Status: ON HOLD | OUTPATIENT
Start: 2018-08-20 | End: 2018-08-22 | Stop reason: HOSPADM

## 2018-08-20 RX ORDER — HEPARIN SODIUM 5000 [USP'U]/ML
INJECTION, SOLUTION INTRAVENOUS; SUBCUTANEOUS
Status: DISCONTINUED
Start: 2018-08-20 | End: 2018-08-21 | Stop reason: HOSPADM

## 2018-08-20 NOTE — PROGRESS NOTES
27 y.o. female  at 37w5d  Add on visit for itching  Reports itching on hands (worse between fingers) but also on palms, and on feet (again, worse between toes) but also on soles. Took benadryl without relief. No other associated sx.   Areas of excoriation on hands and feet. No itching or evidence of PUPPS on abdomen.   -- Will obtain bile acids, CMP, and CBC today. Rx ursodiol provided. NST/ELY today  -- I will call her with results as soon as available  Reports + FM, denies VB, LOF or regular CTX  TW lbs   Reviewed GBS POS and need for intrapartum abx  Reviewed repeat HIV/RPR neg/neg  Reviewed warning signs, normal FKCs, labor precautions and how/when to call.  RTC x 1 wks, call or present sooner prn.   Birth Center Risk Assessment: pending  0- CNM management in ABC  1- CNM management on L&D  2- Consultation with OB to develop plan of care  3- Collaborative CNM/OB management with delivery on L&D  4- Referral or transfer of care to MD

## 2018-08-22 ENCOUNTER — ROUTINE PRENATAL (OUTPATIENT)
Dept: OBSTETRICS AND GYNECOLOGY | Facility: CLINIC | Age: 28
End: 2018-08-22
Payer: COMMERCIAL

## 2018-08-22 ENCOUNTER — HOSPITAL ENCOUNTER (INPATIENT)
Facility: OTHER | Age: 28
LOS: 1 days | Discharge: HOME | End: 2018-08-22
Attending: OBSTETRICS & GYNECOLOGY | Admitting: OBSTETRICS & GYNECOLOGY
Payer: COMMERCIAL

## 2018-08-22 VITALS — BODY MASS INDEX: 26.45 KG/M2 | DIASTOLIC BLOOD PRESSURE: 80 MMHG | WEIGHT: 140 LBS | SYSTOLIC BLOOD PRESSURE: 120 MMHG

## 2018-08-22 VITALS
SYSTOLIC BLOOD PRESSURE: 131 MMHG | OXYGEN SATURATION: 99 % | DIASTOLIC BLOOD PRESSURE: 82 MMHG | TEMPERATURE: 98 F | HEART RATE: 83 BPM

## 2018-08-22 DIAGNOSIS — Z34.90 PREGNANCY WITH ONE FETUS, ANTEPARTUM: Primary | ICD-10-CM

## 2018-08-22 DIAGNOSIS — Z34.93 PRENATAL CARE IN THIRD TRIMESTER: Primary | ICD-10-CM

## 2018-08-22 DIAGNOSIS — O26.643 CHOLESTASIS DURING PREGNANCY IN THIRD TRIMESTER: ICD-10-CM

## 2018-08-22 PROBLEM — O26.613 CHOLESTASIS DURING PREGNANCY IN THIRD TRIMESTER: Status: RESOLVED | Noted: 2018-08-22 | Resolved: 2018-08-22

## 2018-08-22 PROBLEM — K83.1 CHOLESTASIS DURING PREGNANCY IN THIRD TRIMESTER: Status: RESOLVED | Noted: 2018-08-22 | Resolved: 2018-08-22

## 2018-08-22 PROBLEM — K83.1 CHOLESTASIS DURING PREGNANCY IN THIRD TRIMESTER: Status: ACTIVE | Noted: 2018-08-22

## 2018-08-22 PROBLEM — O26.613 CHOLESTASIS DURING PREGNANCY IN THIRD TRIMESTER: Status: ACTIVE | Noted: 2018-08-22

## 2018-08-22 LAB
ABO + RH BLD: NORMAL
BASOPHILS # BLD AUTO: 0.02 K/UL
BASOPHILS NFR BLD: 0.2 %
BLD GP AB SCN CELLS X3 SERPL QL: NORMAL
DIFFERENTIAL METHOD: ABNORMAL
EOSINOPHIL # BLD AUTO: 0 K/UL
EOSINOPHIL NFR BLD: 0.2 %
ERYTHROCYTE [DISTWIDTH] IN BLOOD BY AUTOMATED COUNT: 12.6 %
HCT VFR BLD AUTO: 34.2 %
HGB BLD-MCNC: 11.3 G/DL
LYMPHOCYTES # BLD AUTO: 1.5 K/UL
LYMPHOCYTES NFR BLD: 15.1 %
MCH RBC QN AUTO: 29.5 PG
MCHC RBC AUTO-ENTMCNC: 33 G/DL
MCV RBC AUTO: 89 FL
MONOCYTES # BLD AUTO: 0.7 K/UL
MONOCYTES NFR BLD: 7.2 %
NEUTROPHILS # BLD AUTO: 7.8 K/UL
NEUTROPHILS NFR BLD: 76.6 %
PLATELET # BLD AUTO: 226 K/UL
PMV BLD AUTO: 10.9 FL
RBC # BLD AUTO: 3.83 M/UL
WBC # BLD AUTO: 10.14 K/UL

## 2018-08-22 PROCEDURE — 25000003 PHARM REV CODE 250: Performed by: ADVANCED PRACTICE MIDWIFE

## 2018-08-22 PROCEDURE — 0502F SUBSEQUENT PRENATAL CARE: CPT | Mod: S$GLB,,, | Performed by: ADVANCED PRACTICE MIDWIFE

## 2018-08-22 PROCEDURE — 11000001 HC ACUTE MED/SURG PRIVATE ROOM

## 2018-08-22 PROCEDURE — 99999 PR PBB SHADOW E&M-EST. PATIENT-LVL I: CPT | Mod: PBBFAC,,, | Performed by: ADVANCED PRACTICE MIDWIFE

## 2018-08-22 PROCEDURE — 86901 BLOOD TYPING SEROLOGIC RH(D): CPT

## 2018-08-22 PROCEDURE — 85025 COMPLETE CBC W/AUTO DIFF WBC: CPT

## 2018-08-22 PROCEDURE — 63600175 PHARM REV CODE 636 W HCPCS: Performed by: ADVANCED PRACTICE MIDWIFE

## 2018-08-22 RX ORDER — CARBOPROST TROMETHAMINE 250 UG/ML
250 INJECTION, SOLUTION INTRAMUSCULAR
Status: DISCONTINUED | OUTPATIENT
Start: 2018-08-22 | End: 2018-08-22 | Stop reason: HOSPADM

## 2018-08-22 RX ORDER — SODIUM CHLORIDE, SODIUM LACTATE, POTASSIUM CHLORIDE, CALCIUM CHLORIDE 600; 310; 30; 20 MG/100ML; MG/100ML; MG/100ML; MG/100ML
INJECTION, SOLUTION INTRAVENOUS CONTINUOUS
Status: DISCONTINUED | OUTPATIENT
Start: 2018-08-22 | End: 2018-08-22 | Stop reason: HOSPADM

## 2018-08-22 RX ORDER — ONDANSETRON 8 MG/1
8 TABLET, ORALLY DISINTEGRATING ORAL EVERY 8 HOURS PRN
Status: DISCONTINUED | OUTPATIENT
Start: 2018-08-22 | End: 2018-08-22 | Stop reason: HOSPADM

## 2018-08-22 RX ORDER — MISOPROSTOL 200 UG/1
600 TABLET ORAL
Status: DISCONTINUED | OUTPATIENT
Start: 2018-08-22 | End: 2018-08-22 | Stop reason: HOSPADM

## 2018-08-22 RX ORDER — SODIUM CHLORIDE 9 MG/ML
INJECTION, SOLUTION INTRAVENOUS
Status: DISCONTINUED | OUTPATIENT
Start: 2018-08-22 | End: 2018-08-22 | Stop reason: HOSPADM

## 2018-08-22 RX ORDER — OXYTOCIN/RINGER'S LACTATE 20/1000 ML
333 PLASTIC BAG, INJECTION (ML) INTRAVENOUS CONTINUOUS
Status: ACTIVE | OUTPATIENT
Start: 2018-08-22 | End: 2018-08-22

## 2018-08-22 RX ORDER — METHYLERGONOVINE MALEATE 0.2 MG/ML
200 INJECTION INTRAVENOUS
Status: DISCONTINUED | OUTPATIENT
Start: 2018-08-22 | End: 2018-08-22 | Stop reason: HOSPADM

## 2018-08-22 RX ADMIN — SODIUM CHLORIDE, SODIUM LACTATE, POTASSIUM CHLORIDE, AND CALCIUM CHLORIDE: .6; .31; .03; .02 INJECTION, SOLUTION INTRAVENOUS at 01:08

## 2018-08-22 RX ADMIN — SODIUM CHLORIDE, SODIUM LACTATE, POTASSIUM CHLORIDE, AND CALCIUM CHLORIDE 500 ML: .6; .31; .03; .02 INJECTION, SOLUTION INTRAVENOUS at 03:08

## 2018-08-22 RX ADMIN — SODIUM CHLORIDE, SODIUM LACTATE, POTASSIUM CHLORIDE, AND CALCIUM CHLORIDE 1000 ML: .6; .31; .03; .02 INJECTION, SOLUTION INTRAVENOUS at 02:08

## 2018-08-22 RX ADMIN — DEXTROSE 5 MILLION UNITS: 50 INJECTION, SOLUTION INTRAVENOUS at 01:08

## 2018-08-22 NOTE — DISCHARGE INSTRUCTIONS
Call clinic [phone] or L&D after hours at [phone] for vaginal bleeding, leakage of fluids, regular contractions occurring every five minutes for 2 hours, decreased fetal movements (10 kicks in 2 hours), headache not relieved by tylenol, blurry vision, or temp of 100.4 or greater. Begin doing fetal kick counts, at least 10 movements in 2 hours starting at 28 weeks gestation. Keep next clinic appointment.

## 2018-08-22 NOTE — HPI
Ratna Hanson is a 27 y.o. female  at 38w0d with Estimated Date of Delivery: 18 based on LMP who presents to L&D c/o malaise, fatigue, nausea and vomiting, and severe itching to hands and feet. Bile acids still pending, CMP WNL. Recommendation for IOL per MFM. Reports no regular uterine contractions. She reports + FM. Denies VB or LOF.  Accompanied by no one to L&D. Expecting a girl!    Last ate a LARABAR at 0900, last drank gatorade at 1130.     Pregnancy has been c/b: Cholestasis  Patient Active Problem List   Diagnosis    Nausea and vomiting during pregnancy prior to 22 weeks gestation    History of 3 SABs in 2017, not medically confirmed    Pelvic pain in pregnancy, antepartum, first trimester    History of postpartum depression, currently pregnant    H/O delivery by vacuum extraction, currently pregnant    Pregnancy with one fetus, antepartum    Abdominal cramping affecting pregnancy    Group B streptococcal infection during pregnancy    Itching

## 2018-08-22 NOTE — DISCHARGE SUMMARY
[facility]  Obstetrics  Discharge Summary      Patient Name: Ratna Hanson  MRN: [id number]  Admission Date: 2018  Hospital Length of Stay: 0 days  Discharge Date and Time:  2018 4:28 PM  Attending Physician: Nehal Guerrero MD   Discharging Provider: Snow Jonas CNM  Primary Care Provider: Primary Doctor No    HPI:   Ratna Hanson is a 27 y.o. female  at 38w0d with Estimated Date of Delivery: 18 based on LMP who presents to L&D c/o malaise, fatigue, nausea and vomiting, and severe itching to hands and feet. Bile acids still pending, CMP WNL. Recommendation for IOL per MFM. Reports no regular uterine contractions. She reports + FM. Denies VB or LOF.  Accompanied by no one to L&D. Expecting a girl!    Last ate a LARABAR at 0900, last drank gatorade at 1130.     Pregnancy has been c/b: Cholestasis  Patient Active Problem List   Diagnosis    Nausea and vomiting during pregnancy prior to 22 weeks gestation    History of 3 SABs in 2017, not medically confirmed    Pelvic pain in pregnancy, antepartum, first trimester    History of postpartum depression, currently pregnant    H/O delivery by vacuum extraction, currently pregnant    Pregnancy with one fetus, antepartum    Abdominal cramping affecting pregnancy    Group B streptococcal infection during pregnancy    Itching       * No surgery found *     Hospital Course:   Admitted to L&D for IOL secondary to Cholestasis of pregnancy    Consults (From admission, onward)        Status Ordering Provider     Inpatient consult to Anesthesiology  Once     Provider:  (Not yet assigned)    Acknowledged SNOW JONAS          Final Active Diagnoses:    Diagnosis Date Noted POA    Itching [L29.9]  Yes    Group B streptococcal infection during pregnancy [O98.819, B95.1] 2018 Yes    Pregnancy with one fetus, antepartum [Z34.90] 2018 Not Applicable      Problems Resolved During this Admission:    Diagnosis Date Noted  Date Resolved POA    PRINCIPAL PROBLEM:  Cholestasis during pregnancy in third trimester [O26.613, K83.1] 08/22/2018 08/22/2018 Yes    Indication for care in labor or delivery [O75.9] 08/22/2018 08/22/2018 Yes        Labs:   CBC   Recent Labs   Lab  08/22/18   1254   WBC  10.14   HGB  11.3*   HCT  34.2*   PLT  226       Immunizations     None          This patient has no babies on file.  Pending Diagnostic Studies:     None          Discharged Condition: stable    Disposition: Home or Self Care    Follow Up:  Follow-up Information     Rocío Hoffman CNM In 1 week.    Specialty:  Obstetrics and Gynecology  Why:  Routine OB appointment  Contact information:  [address] [facility] [address] [facility] [address] [phone]                 Patient Instructions:      Diet Adult Regular     Notify your health care provider if you experience any of the following:  temperature >100.4     Notify your health care provider if you experience any of the following:  persistent nausea and vomiting or diarrhea     Notify your health care provider if you experience any of the following:  severe uncontrolled pain     Notify your health care provider if you experience any of the following:  difficulty breathing or increased cough     Notify your health care provider if you experience any of the following:  severe persistent headache     Notify your health care provider if you experience any of the following:  persistent dizziness, light-headedness, or visual disturbances     Notify your health care provider if you experience any of the following:  increased confusion or weakness     Activity as tolerated     Medications:  Current Discharge Medication List      CONTINUE these medications which have NOT CHANGED    Details   diphenhydrAMINE (BENADRYL) 50 MG capsule Take 50 mg by mouth every 6 (six) hours as needed for Itching.      doxylamine-pyridoxine, vit B6, 10-10 mg TbEC Take 2 tablets by mouth every evening. May add one tablet in morning  and one in afternoon.  Qty: 30 tablet, Refills: 1      ferrous sulfate 325 mg (65 mg iron) Tab tablet Take 325 mg by mouth daily with breakfast.      metoclopramide HCl (REGLAN) 10 MG tablet Take 1 tablet (10 mg total) by mouth every 6 (six) hours as needed.  Qty: 15 tablet, Refills: 0      PNV COMB NO.59/IRON/FA/DHA (PRENATAL-DHA ORAL) Take by mouth.      PRENATAL VIT/IRON FUM/FOLIC AC (PRENATAL-FOLIC ACID ORAL) Take by mouth.      promethazine (PHENERGAN) 25 MG tablet Take 0.5 tablets (12.5 mg total) by mouth every 4 (four) hours as needed for Nausea.  Qty: 30 tablet, Refills: 0    Associated Diagnoses: Nausea/vomiting in pregnancy         STOP taking these medications       ursodiol (ACTIGALL) 250 mg Tab Comments:   Reason for Stopping:             Reviewed labor precautions, along with 3T warning s/s.  Daily FMC reinforced.    Snow Jonas CNM  Obstetrics  [facility]

## 2018-08-22 NOTE — ASSESSMENT & PLAN NOTE
Continue routine prenatal care and F/U in clinic within 1wk  Reviewed labor precautions, along with warning s/s to F/U and Call or come in with.  Daily FMC reinforced.

## 2018-08-22 NOTE — PROGRESS NOTES
"LABOR NOTE    S:  Pt standing at bedside with IV fluids going.  Denies pain. Reports feeling "way better than this morning!" and denies malaise or fatigue.  Reports irregular, mild UCs. Denies LOF or VB. Reports good FM.  Family at bedside and supportive.     O: /64   Pulse 93   Temp 97.5 °F (36.4 °C) (Temporal)   LMP 2017 (Exact Date)   SpO2 99%     Bile Acid just resulted: NEGATIVE (4mcmol/L)  CMP: shows AST/ALT and Alkaline Phophatase all WNL.      GENERAL: Calm and appropriate affect  NEURO: Alert, oriented, normal speech  ABDOMEN: Nontender, Fundus palpates soft between UC's.  FHT: Baseline 135, moderate BTBV, positive accels, no decels. Cat 1, reassuring.  CTX: q 2-6 minutes  SVE: deferred      ASSESSMENT:   27 y.o.  IUP at 38w0d, FHT reassuring/ Cat 1    Patient Active Problem List   Diagnosis    Nausea and vomiting during pregnancy prior to 22 weeks gestation    History of 3 SABs in 2017, not medically confirmed    Pelvic pain in pregnancy, antepartum, first trimester    History of postpartum depression, currently pregnant    H/O delivery by vacuum extraction, currently pregnant    Pregnancy with one fetus, antepartum    Abdominal cramping affecting pregnancy    Group B streptococcal infection during pregnancy    Itching         PLAN:  Have been unable to initiate IOL and Cytotec, secondary to regular UC pattern.  Discussed with pt newly resulted normal Bile Acids, indicating negative testing and no diagnosis of Cholestasis of pregnancy, hence, no medical indication for IOL now.  Pt strongly desires discharge and to await spontaneous labor.  Will finish current bag of fluid, as suspect dehydration could be cause for fatigue/malaise this morning.    Reviewed labor precautions and warning s/s, and will discharge home.  Consult with Dr. Guerrero, in agreement re: POC.    Snow Jonas CNM    "

## 2018-08-22 NOTE — SUBJECTIVE & OBJECTIVE
Obstetric HPI:  Patient reports None contractions, active fetal movement, No vaginal bleeding , No loss of fluid       Obstetric History       T1      L1     SAB3   TAB0   Ectopic0   Multiple0   Live Births1       # Outcome Date GA Lbr Albaro/2nd Weight Sex Delivery Anes PTL Lv   5 Current            4 SAB 17 4w0d          3 SAB 17 4w0d          2 SAB 10/31/16 5w0d          1 Term 06 38w0d  2.948 kg (6 lb 8 oz) M Vag-Vacuum EPI  SOWMYA      Name: Gabo        Past Medical History:   Diagnosis Date    Breast disorder     Benign breast cysts; negative imaging    Cholestasis during pregnancy in third trimester 2018    Postpartum depression     Used seroquil x ~ 1 month     Past Surgical History:   Procedure Laterality Date    BREAST SURGERY      augmentation bilateral    CHOLECYSTECTOMY      WISDOM TOOTH EXTRACTION         PTA Medications   Medication Sig    diphenhydrAMINE (BENADRYL) 50 MG capsule Take 50 mg by mouth every 6 (six) hours as needed for Itching.    doxylamine-pyridoxine, vit B6, 10-10 mg TbEC Take 2 tablets by mouth every evening. May add one tablet in morning and one in afternoon.    ferrous sulfate 325 mg (65 mg iron) Tab tablet Take 325 mg by mouth daily with breakfast.    metoclopramide HCl (REGLAN) 10 MG tablet Take 1 tablet (10 mg total) by mouth every 6 (six) hours as needed.    PNV COMB NO.59/IRON/FA/DHA (PRENATAL-DHA ORAL) Take by mouth.    PRENATAL VIT/IRON FUM/FOLIC AC (PRENATAL-FOLIC ACID ORAL) Take by mouth.    promethazine (PHENERGAN) 25 MG tablet Take 0.5 tablets (12.5 mg total) by mouth every 4 (four) hours as needed for Nausea.    ursodiol (ACTIGALL) 250 mg Tab Take 1 tablet (250 mg total) by mouth 3 (three) times daily with meals.       Review of patient's allergies indicates:  No Known Allergies     Family History     Problem Relation (Age of Onset)    Heart attacks under age 50 Mother    Heart disease Mother, Paternal Grandfather     Hyperlipidemia Sister    No Known Problems Father, Paternal Grandmother, Maternal Grandmother        Tobacco Use    Smoking status: Never Smoker    Smokeless tobacco: Never Used   Substance and Sexual Activity    Alcohol use: Yes     Comment: none since pregnancy    Drug use: No    Sexual activity: Yes     Birth control/protection: None     Comment: planning pregnancy     Review of Systems   Constitutional: Positive for fatigue. Negative for activity change, chills, fever and unexpected weight change.   Eyes: Negative for visual disturbance.   Respiratory: Negative for cough and shortness of breath.    Cardiovascular: Negative for chest pain.   Gastrointestinal: Negative for abdominal pain, constipation, diarrhea, nausea and vomiting.   Genitourinary: Negative for dysuria, genital sores, vaginal bleeding, vaginal discharge and vaginal odor.   Skin:  Negative for rash.        Pruritis - most severe to palms of hands and soles of feet   Neurological: Negative for syncope and headaches.   Psychiatric/Behavioral: Negative for depression.      Objective:     Vital Signs (Most Recent):    Vital Signs (24h Range):  BP: (120)/(80) 120/80        There is no height or weight on file to calculate BMI.    FHT:  Baseline 140, moderate BTBV, positive accels and no decels.  Cat 1 (reassuring)  TOCO: Q 2-6 minutes    Physical Exam:   Constitutional: She is oriented to person, place, and time. She appears well-developed and well-nourished.    HENT:   Head: Normocephalic and atraumatic.     Neck: Normal range of motion.    Cardiovascular: Normal rate and regular rhythm.     Pulmonary/Chest: Effort normal. No respiratory distress.        Abdominal: Soft. Distention: Gravid. There is no tenderness. There is no guarding.             Musculoskeletal: Normal range of motion and moves all extremeties.       Neurological: She is alert and oriented to person, place, and time.    Skin: Skin is warm, dry and intact. No rash noted. No  erythema.    Psychiatric: She has a normal mood and affect. Her behavior is normal.       Cervix:  Dilation:  2  Effacement:  50%  Station: -4  Presentation: Vertex     Significant Labs:  Lab Results   Component Value Date    GROUPTRH A POS 05/26/2018    HEPBSAG Negative 01/23/2018    STREPBCULT  08/07/2018     STREPTOCOCCUS AGALACTIAE (GROUP B)  Beta-hemolytic streptococci are routinely susceptible to   penicillins,cephalosporins and carbapenems.         I have personallly reviewed all pertinent lab results from the last 24 hours.

## 2018-08-22 NOTE — PROGRESS NOTES
Was scheduled for routine prenatal visit tomorrow however she presented to clinic today as she feels worse; reports feeling incredibly fatigued, ursodiol not helping with itching very much, now nauseous and experiencing diarrhea as well.   Reports normal FM, denies VB LOF or cramping  Reviewed CMP and CBC  Bile acids not resulted yet --> called lab who reported collection 8/20 with send out 8/21. Called [facility] (spoke with Ratna there) who stated that have not received specimen    I then went to consult with Dr Merino --> will proceed with IOL 2/2 cholestasis based on empirical dx  Patient in agreement  Staff notified  I walked her up to L&D check in desk for IOL

## 2018-08-22 NOTE — ASSESSMENT & PLAN NOTE
Admit to L&D  CBC and T&S  Consult anesthesia  Reviewed consents signed and to chart  Discussed induction agents of Cytotec vs Cervidil vs Pitocin vs AROM and risks/benefits to each.  Recommend Cytotec PO 50mcg x1 now and pt agreeable to this.   CEFM

## 2018-08-22 NOTE — H&P
[facility]  Obstetrics  History & Physical    Patient Name: Ratna Hanson  MRN: [id number]  Admission Date: 2018  Primary Care Provider: Primary Doctor No    Subjective:     Principal Problem:Cholestasis during pregnancy in third trimester    History of Present Illness:  Ratna Hanson is a 27 y.o. female  at 38w0d with Estimated Date of Delivery: 18 based on LMP who presents to L&D c/o malaise, fatigue, nausea and vomiting, and severe itching to hands and feet. Bile acids still pending, CMP WNL. Recommendation for IOL per MFM. Reports no regular uterine contractions. She reports + FM. Denies VB or LOF.  Accompanied by no one to L&D. Expecting a girl!    Last ate a LARABAR at 0900, last drank gatorade at 1130.     Pregnancy has been c/b: Cholestasis  Patient Active Problem List   Diagnosis    Nausea and vomiting during pregnancy prior to 22 weeks gestation    History of 3 SABs in 2017, not medically confirmed    Pelvic pain in pregnancy, antepartum, first trimester    History of postpartum depression, currently pregnant    H/O delivery by vacuum extraction, currently pregnant    Pregnancy with one fetus, antepartum    Abdominal cramping affecting pregnancy    Group B streptococcal infection during pregnancy    Itching       Obstetric HPI:  Patient reports None contractions, active fetal movement, No vaginal bleeding , No loss of fluid       Obstetric History       T1      L1     SAB3   TAB0   Ectopic0   Multiple0   Live Births1       # Outcome Date GA Lbr Albaro/2nd Weight Sex Delivery Anes PTL Lv   5 Current            4 SAB 17 4w0d          3 SAB 17 4w0d          2 SAB 10/31/16 5w0d          1 Term 06 38w0d  2.948 kg (6 lb 8 oz) M Vag-Vacuum EPI  SOWMYA      Name: Gabo        Past Medical History:   Diagnosis Date    Breast disorder     Benign breast cysts; negative imaging    Cholestasis during pregnancy in third trimester 2018     Postpartum depression     Used seroquil x ~ 1 month     Past Surgical History:   Procedure Laterality Date    BREAST SURGERY      augmentation bilateral    CHOLECYSTECTOMY      WISDOM TOOTH EXTRACTION         PTA Medications   Medication Sig    diphenhydrAMINE (BENADRYL) 50 MG capsule Take 50 mg by mouth every 6 (six) hours as needed for Itching.    doxylamine-pyridoxine, vit B6, 10-10 mg TbEC Take 2 tablets by mouth every evening. May add one tablet in morning and one in afternoon.    ferrous sulfate 325 mg (65 mg iron) Tab tablet Take 325 mg by mouth daily with breakfast.    metoclopramide HCl (REGLAN) 10 MG tablet Take 1 tablet (10 mg total) by mouth every 6 (six) hours as needed.    PNV COMB NO.59/IRON/FA/DHA (PRENATAL-DHA ORAL) Take by mouth.    PRENATAL VIT/IRON FUM/FOLIC AC (PRENATAL-FOLIC ACID ORAL) Take by mouth.    promethazine (PHENERGAN) 25 MG tablet Take 0.5 tablets (12.5 mg total) by mouth every 4 (four) hours as needed for Nausea.    ursodiol (ACTIGALL) 250 mg Tab Take 1 tablet (250 mg total) by mouth 3 (three) times daily with meals.       Review of patient's allergies indicates:  No Known Allergies     Family History     Problem Relation (Age of Onset)    Heart attacks under age 50 Mother    Heart disease Mother, Paternal Grandfather    Hyperlipidemia Sister    No Known Problems Father, Paternal Grandmother, Maternal Grandmother        Tobacco Use    Smoking status: Never Smoker    Smokeless tobacco: Never Used   Substance and Sexual Activity    Alcohol use: Yes     Comment: none since pregnancy    Drug use: No    Sexual activity: Yes     Birth control/protection: None     Comment: planning pregnancy     Review of Systems   Constitutional: Positive for fatigue. Negative for activity change, chills, fever and unexpected weight change.   Eyes: Negative for visual disturbance.   Respiratory: Negative for cough and shortness of breath.    Cardiovascular: Negative for chest pain.    Gastrointestinal: Negative for abdominal pain, constipation, diarrhea, nausea and vomiting.   Genitourinary: Negative for dysuria, genital sores, vaginal bleeding, vaginal discharge and vaginal odor.   Skin:  Negative for rash.        Pruritis - most severe to palms of hands and soles of feet   Neurological: Negative for syncope and headaches.   Psychiatric/Behavioral: Negative for depression.      Objective:     Vital Signs (Most Recent):    Vital Signs (24h Range):  BP: (120)/(80) 120/80        There is no height or weight on file to calculate BMI.    FHT:  Baseline 140, moderate BTBV, positive accels and no decels.  Cat 1 (reassuring)  TOCO: Q 2-6 minutes    Physical Exam:   Constitutional: She is oriented to person, place, and time. She appears well-developed and well-nourished.    HENT:   Head: Normocephalic and atraumatic.     Neck: Normal range of motion.    Cardiovascular: Normal rate and regular rhythm.     Pulmonary/Chest: Effort normal. No respiratory distress.        Abdominal: Soft. Distention: Gravid. There is no tenderness. There is no guarding.             Musculoskeletal: Normal range of motion and moves all extremeties.       Neurological: She is alert and oriented to person, place, and time.    Skin: Skin is warm, dry and intact. No rash noted. No erythema.    Psychiatric: She has a normal mood and affect. Her behavior is normal.       Cervix:  Dilation:  2  Effacement:  50%  Station: -4  Presentation: Vertex     Significant Labs:  Lab Results   Component Value Date    GROUPTRH A POS 2018    HEPBSAG Negative 2018    STREPBCULT  2018     STREPTOCOCCUS AGALACTIAE (GROUP B)  Beta-hemolytic streptococci are routinely susceptible to   penicillins,cephalosporins and carbapenems.         I have personallly reviewed all pertinent lab results from the last 24 hours.    Assessment/Plan:     27 y.o. female  at 38w0d for:    * Cholestasis during pregnancy in third trimester    Admit  to Labor and Delivery for IOL, per [facility]        Indication for care in labor or delivery    Admit to L&D  CBC and T&S  Consult anesthesia  Reviewed consents signed and to chart  Discussed induction agents of Cytotec vs Cervidil vs Pitocin vs AROM and risks/benefits to each.  Recommend Cytotec PO 50mcg x1 now and pt agreeable to this.   CEFM        Group B streptococcal infection during pregnancy    Penicillin G IVPB per protocol            Snow Jonas CNM  Obstetrics  [facility]

## 2018-08-23 ENCOUNTER — TELEPHONE (OUTPATIENT)
Dept: OBSTETRICS AND GYNECOLOGY | Facility: CLINIC | Age: 28
End: 2018-08-23

## 2018-08-23 NOTE — TELEPHONE ENCOUNTER
Spoke with Ratna re: yesterday and test results bile acids 4  Will schedule f/u for this Monday  She will call back / come in beforehand with any changes  Rocío Farias CNM/NP  Certified Nurse Midwife/Nurse Practitioner  8/23/2018 12:03 PM        [facility]ORV[facility] Staff   Caller: RATNA MOORE [[id number]] (Today, 11:20 AM)               Name of Who is Calling: RATNA MOORE [[id number]]     What is the request in detail: Patient would like to speak with staff in regards to being sent home from labor and delivery yesterday. Wants to discuss test results and next steps to take. Please advise       Can the clinic reply by [facility]: yes     What Number to Call Back if not in [facility]: [phone]

## 2018-08-27 ENCOUNTER — ROUTINE PRENATAL (OUTPATIENT)
Dept: OBSTETRICS AND GYNECOLOGY | Facility: CLINIC | Age: 28
End: 2018-08-27
Payer: COMMERCIAL

## 2018-08-27 VITALS — WEIGHT: 140.75 LBS | SYSTOLIC BLOOD PRESSURE: 118 MMHG | BODY MASS INDEX: 26.6 KG/M2 | DIASTOLIC BLOOD PRESSURE: 82 MMHG

## 2018-08-27 DIAGNOSIS — Z36.89 ENCOUNTER FOR ULTRASOUND TO ASSESS FETAL GROWTH: Primary | ICD-10-CM

## 2018-08-27 PROCEDURE — 99999 PR PBB SHADOW E&M-EST. PATIENT-LVL II: CPT | Mod: PBBFAC,,, | Performed by: ADVANCED PRACTICE MIDWIFE

## 2018-08-27 PROCEDURE — 0502F SUBSEQUENT PRENATAL CARE: CPT | Mod: S$GLB,,, | Performed by: ADVANCED PRACTICE MIDWIFE

## 2018-08-27 NOTE — PROGRESS NOTES
"27 y.o. female  at 38w5d  Reports + FM (baby is wild this morning), denies VB, LOF or regular CTX  N & V:   Pt reports some nausea and vomiting early this morning that has resolved. She states she is able to drink and eat at this point and is holding foods down normally. She took 12.5 mg unisom this morning and was able to nap. When she awoke n/v had resolved. She states she was having some stronger contractions overnight but nothing regular.   Itching:   Pt reports itching began to resolve on Friday and by Saturday it "was basically gone". She has not had any intense itching since.   No interval growth x 2 wks - ordered follow-up ultrasound  TW lbs   Delivery consents already signed  Reviewed GBS pos and need for intrapartum abx  Reviewed warning signs, normal FKCs, labor precautions and how/when to call.  RTC x 1 wk, call or present sooner prn.   Birth Center Risk Assessment: 0- Meets birth center guidelines    0- CNM management in ABC  1- CNM management on L&D  2- Consultation with OB to develop  plan of care  3- Collaborative CNM/OB management with delivery on L&D  4- Referral of care to MD temporarily  5- Permanent referral of care to MD  "

## 2018-08-29 ENCOUNTER — PROCEDURE VISIT (OUTPATIENT)
Dept: MATERNAL FETAL MEDICINE | Facility: CLINIC | Age: 28
End: 2018-08-29
Payer: COMMERCIAL

## 2018-08-29 DIAGNOSIS — Z36.89 ENCOUNTER FOR ULTRASOUND TO ASSESS FETAL GROWTH: ICD-10-CM

## 2018-08-29 PROCEDURE — 76816 OB US FOLLOW-UP PER FETUS: CPT | Mod: S$GLB,,, | Performed by: PEDIATRICS

## 2018-08-29 PROCEDURE — 99499 UNLISTED E&M SERVICE: CPT | Mod: S$GLB,,, | Performed by: PEDIATRICS

## 2018-08-29 NOTE — PROGRESS NOTES
Indication  ========    Evaluation of fetal growth, S<D .    Pregnancy History  ==============    Maternal Lab Tests  Result: declined screenings    Method  ======    Transabdominal ultrasound examination. View: Good view.    Pregnancy  =========    Greer pregnancy. Number of fetuses: 1.    Dating  ======    LMP on: 11/29/2017  Cycle: regular cycle  GA by LMP 39 w + 0 d  DANIEL by LMP: 9/5/2018  Ultrasound examination on: 8/29/2018  GA by U/S based upon: AC, BPD, Femur, HC  GA by U/S 37 w + 4 d  DANIEL by U/S: 9/15/2018  Assigned: Dating performed on 02/21/2018, based on the LMP  Assigned GA 39 w + 0 d  Assigned DANIEL: 9/5/2018    General Evaluation  ==============    Cardiac activity: present.  bpm.  Fetal movements: visualized.  Presentation: cephalic.  Placenta: posterior, fundal.  Amniotic fluid: MVP 5.8 cm.    Fetal Biometry  ============    Fetal Biometry  BPD 89.4 mm 36w 1d Hadlock  .8 mm  .3 mm 37w 5d Hadlock  .2 mm 38w 5d Hadlock  Femur 73.3 mm 37w 4d Hadlock  EFW 3,374 g 48% Keny  Calculated by: Hadlock (BPD-HC-AC-FL)  EFW (lb) 7 lb  EFW (oz) 7 oz  Cephalic index 0.76  HC / AC 0.95  FL / BPD 0.82  FL / AC 0.21  MVP 5.8 cm   bpm    Fetal Anatomy  ============    Cranium: normal  Stomach: normal  Kidneys: normal  Bladder: normal  Other: A full anatomy survey previously performed.    Impression  =========    Fetal size is AGA with the EFW at the 48th percentile.  Normal repeat limited fetal anatomic survey.  AFV is normal.    Follow-up ultrasound as clinically indicated.      Recommendation  ==============    Thank you again for allowing us to participate in the care of your patients. If you have any questions concerning today's consultation, feel free  to contact me or one of my partners. We can be reached at [phone] during normal business hours. If you have a question after normal  business hours, please contact Labor and Delivery at [phone].

## 2018-09-01 ENCOUNTER — TELEPHONE (OUTPATIENT)
Dept: OBSTETRICS AND GYNECOLOGY | Facility: HOSPITAL | Age: 28
End: 2018-09-01

## 2018-09-01 ENCOUNTER — HOSPITAL ENCOUNTER (INPATIENT)
Facility: OTHER | Age: 28
LOS: 3 days | Discharge: HOME | End: 2018-09-04
Attending: OBSTETRICS & GYNECOLOGY | Admitting: OBSTETRICS & GYNECOLOGY
Payer: COMMERCIAL

## 2018-09-01 ENCOUNTER — ANESTHESIA (OUTPATIENT)
Dept: OBSTETRICS AND GYNECOLOGY | Facility: OTHER | Age: 28
End: 2018-09-01

## 2018-09-01 ENCOUNTER — ANESTHESIA EVENT (OUTPATIENT)
Dept: OBSTETRICS AND GYNECOLOGY | Facility: OTHER | Age: 28
End: 2018-09-01

## 2018-09-01 DIAGNOSIS — Z3A.39 39 WEEKS GESTATION OF PREGNANCY: Primary | ICD-10-CM

## 2018-09-01 PROBLEM — O99.820 GROUP B STREPTOCOCCUS CARRIER, ANTEPARTUM: Status: ACTIVE | Noted: 2018-08-09

## 2018-09-01 PROBLEM — O14.90 PRE-ECLAMPSIA AFFECTING PREGNANCY, ANTEPARTUM: Status: ACTIVE | Noted: 2018-09-01

## 2018-09-01 PROBLEM — Z37.9 NORMAL LABOR: Status: ACTIVE | Noted: 2018-09-01

## 2018-09-01 LAB
ABO + RH BLD: NORMAL
ALBUMIN SERPL BCP-MCNC: 3.3 G/DL
ALP SERPL-CCNC: 147 U/L
ALT SERPL W/O P-5'-P-CCNC: 12 U/L
ANION GAP SERPL CALC-SCNC: 15 MMOL/L
AST SERPL-CCNC: 17 U/L
BASOPHILS # BLD AUTO: 0.02 K/UL
BASOPHILS NFR BLD: 0.2 %
BILIRUB SERPL-MCNC: 0.6 MG/DL
BLD GP AB SCN CELLS X3 SERPL QL: NORMAL
BUN SERPL-MCNC: 5 MG/DL
CALCIUM SERPL-MCNC: 10.6 MG/DL
CHLORIDE SERPL-SCNC: 102 MMOL/L
CO2 SERPL-SCNC: 19 MMOL/L
CREAT SERPL-MCNC: 0.7 MG/DL
CREAT UR-MCNC: 10.9 MG/DL
CREAT UR-MCNC: 18.2 MG/DL
DIFFERENTIAL METHOD: ABNORMAL
EOSINOPHIL # BLD AUTO: 0 K/UL
EOSINOPHIL NFR BLD: 0.1 %
ERYTHROCYTE [DISTWIDTH] IN BLOOD BY AUTOMATED COUNT: 13.1 %
EST. GFR  (AFRICAN AMERICAN): >60 ML/MIN/1.73 M^2
EST. GFR  (NON AFRICAN AMERICAN): >60 ML/MIN/1.73 M^2
GLUCOSE SERPL-MCNC: 92 MG/DL
HCT VFR BLD AUTO: 33.3 %
HGB BLD-MCNC: 10.8 G/DL
LYMPHOCYTES # BLD AUTO: 1.4 K/UL
LYMPHOCYTES NFR BLD: 12.7 %
MCH RBC QN AUTO: 29 PG
MCHC RBC AUTO-ENTMCNC: 32.4 G/DL
MCV RBC AUTO: 90 FL
MONOCYTES # BLD AUTO: 0.8 K/UL
MONOCYTES NFR BLD: 7.4 %
NEUTROPHILS # BLD AUTO: 8.9 K/UL
NEUTROPHILS NFR BLD: 78.9 %
PLATELET # BLD AUTO: 242 K/UL
PMV BLD AUTO: 11.3 FL
POTASSIUM SERPL-SCNC: 4.6 MMOL/L
PROT SERPL-MCNC: 7.5 G/DL
PROT UR-MCNC: 16 MG/DL
PROT UR-MCNC: 9 MG/DL
PROT/CREAT UR: 0.49 MG/G{CREAT}
PROT/CREAT UR: 1.47 MG/G{CREAT}
RBC # BLD AUTO: 3.72 M/UL
SODIUM SERPL-SCNC: 136 MMOL/L
WBC # BLD AUTO: 11.25 K/UL

## 2018-09-01 PROCEDURE — 72100003 HC LABOR CARE, EA. ADDL. 8 HRS

## 2018-09-01 PROCEDURE — 86901 BLOOD TYPING SEROLOGIC RH(D): CPT

## 2018-09-01 PROCEDURE — 82570 ASSAY OF URINE CREATININE: CPT

## 2018-09-01 PROCEDURE — 11000001 HC ACUTE MED/SURG PRIVATE ROOM

## 2018-09-01 PROCEDURE — 99283 EMERGENCY DEPT VISIT LOW MDM: CPT | Mod: 25,,, | Performed by: OBSTETRICS & GYNECOLOGY

## 2018-09-01 PROCEDURE — 72100002 HC LABOR CARE, 1ST 8 HOURS

## 2018-09-01 PROCEDURE — 59025 FETAL NON-STRESS TEST: CPT | Mod: 26,,, | Performed by: OBSTETRICS & GYNECOLOGY

## 2018-09-01 PROCEDURE — 85025 COMPLETE CBC W/AUTO DIFF WBC: CPT

## 2018-09-01 PROCEDURE — 80053 COMPREHEN METABOLIC PANEL: CPT

## 2018-09-01 PROCEDURE — 25000003 PHARM REV CODE 250: Performed by: ADVANCED PRACTICE MIDWIFE

## 2018-09-01 PROCEDURE — 99285 EMERGENCY DEPT VISIT HI MDM: CPT | Mod: 25

## 2018-09-01 PROCEDURE — 63600175 PHARM REV CODE 636 W HCPCS: Performed by: ADVANCED PRACTICE MIDWIFE

## 2018-09-01 PROCEDURE — 59025 FETAL NON-STRESS TEST: CPT

## 2018-09-01 PROCEDURE — 36415 COLL VENOUS BLD VENIPUNCTURE: CPT

## 2018-09-01 RX ORDER — LIDOCAINE HYDROCHLORIDE 10 MG/ML
INJECTION INFILTRATION; PERINEURAL
Status: DISCONTINUED
Start: 2018-09-01 | End: 2018-09-02 | Stop reason: WASHOUT

## 2018-09-01 RX ORDER — OXYTOCIN/RINGER'S LACTATE 20/1000 ML
2 PLASTIC BAG, INJECTION (ML) INTRAVENOUS CONTINUOUS
Status: DISCONTINUED | OUTPATIENT
Start: 2018-09-01 | End: 2018-09-02

## 2018-09-01 RX ORDER — ONDANSETRON 8 MG/1
8 TABLET, ORALLY DISINTEGRATING ORAL EVERY 8 HOURS PRN
Status: DISCONTINUED | OUTPATIENT
Start: 2018-09-01 | End: 2018-09-02

## 2018-09-01 RX ORDER — CARBOPROST TROMETHAMINE 250 UG/ML
250 INJECTION, SOLUTION INTRAMUSCULAR
Status: DISCONTINUED | OUTPATIENT
Start: 2018-09-01 | End: 2018-09-02

## 2018-09-01 RX ORDER — OXYTOCIN/RINGER'S LACTATE 20/1000 ML
41.65 PLASTIC BAG, INJECTION (ML) INTRAVENOUS CONTINUOUS
Status: ACTIVE | OUTPATIENT
Start: 2018-09-01 | End: 2018-09-01

## 2018-09-01 RX ORDER — MISOPROSTOL 200 UG/1
600 TABLET ORAL
Status: DISCONTINUED | OUTPATIENT
Start: 2018-09-01 | End: 2018-09-02

## 2018-09-01 RX ADMIN — DEXTROSE 2.5 MILLION UNITS: 50 INJECTION, SOLUTION INTRAVENOUS at 10:09

## 2018-09-01 RX ADMIN — Medication 2 MILLI-UNITS/MIN: at 05:09

## 2018-09-01 RX ADMIN — DEXTROSE 2.5 MILLION UNITS: 50 INJECTION, SOLUTION INTRAVENOUS at 06:09

## 2018-09-01 RX ADMIN — DEXTROSE 5 MILLION UNITS: 50 INJECTION, SOLUTION INTRAVENOUS at 02:09

## 2018-09-01 NOTE — SUBJECTIVE & OBJECTIVE
Obstetric HPI:  Patient reports Date/time of onset: 2018 @ 0800, Frequency: Every 4-8 minutes, Duration: 45-60 seconds and Intensity: strong contractions, active fetal movement, No vaginal bleeding , Yes loss of fluid     This pregnancy has been complicated by nausea and vomiting throughout pregnancy, hx PPD, hx VAVD, GBS +, and itching that resolved with normal bile acids.     Obstetric History       T1      L1     SAB3   TAB0   Ectopic0   Multiple0   Live Births1       # Outcome Date GA Lbr Albaro/2nd Weight Sex Delivery Anes PTL Lv   5 Current            4 SAB 17 4w0d          3 SAB 17 4w0d          2 SAB 10/31/16 5w0d          1 Term 06 38w0d  2.948 kg (6 lb 8 oz) M Vag-Vacuum EPI  SOWMYA      Name: Gabo        Past Medical History:   Diagnosis Date    Breast disorder     Benign breast cysts; negative imaging    Cholestasis during pregnancy in third trimester 2018    Postpartum depression     Used seroquil x ~ 1 month     Past Surgical History:   Procedure Laterality Date    BREAST SURGERY      augmentation bilateral    CHOLECYSTECTOMY      WISDOM TOOTH EXTRACTION         PTA Medications   Medication Sig    diphenhydrAMINE (BENADRYL) 50 MG capsule Take 50 mg by mouth every 6 (six) hours as needed for Itching.    doxylamine-pyridoxine, vit B6, 10-10 mg TbEC Take 2 tablets by mouth every evening. May add one tablet in morning and one in afternoon.    ferrous sulfate 325 mg (65 mg iron) Tab tablet Take 325 mg by mouth daily with breakfast.    metoclopramide HCl (REGLAN) 10 MG tablet Take 1 tablet (10 mg total) by mouth every 6 (six) hours as needed.    PNV COMB NO.59/IRON/FA/DHA (PRENATAL-DHA ORAL) Take by mouth.    PRENATAL VIT/IRON FUM/FOLIC AC (PRENATAL-FOLIC ACID ORAL) Take by mouth.    promethazine (PHENERGAN) 25 MG tablet Take 0.5 tablets (12.5 mg total) by mouth every 4 (four) hours as needed for Nausea.       Review of patient's allergies indicates:  No  Known Allergies     Family History     Problem Relation (Age of Onset)    Heart attacks under age 50 Mother    Heart disease Mother, Paternal Grandfather    Hyperlipidemia Sister    No Known Problems Father, Paternal Grandmother, Maternal Grandmother        Tobacco Use    Smoking status: Never Smoker    Smokeless tobacco: Never Used   Substance and Sexual Activity    Alcohol use: Yes     Comment: none since pregnancy    Drug use: No    Sexual activity: Yes     Birth control/protection: None     Comment: planning pregnancy     Review of Systems   Constitutional: Negative for fever.   Eyes: Negative for visual disturbance.   Respiratory: Negative for cough and shortness of breath.    Cardiovascular: Negative for chest pain and leg swelling.   Gastrointestinal: Positive for abdominal pain (contractions ). Negative for constipation, diarrhea, nausea and vomiting.   Genitourinary: Positive for vaginal discharge (clear amniotic fluid - pt grossly ruptured ). Negative for genital sores and vaginal bleeding.   Neurological: Negative for headaches.      Objective:     Vital Signs (Most Recent):  Temp: 98.8 °F (37.1 °C) (09/01/18 1208)  Pulse: 102 (09/01/18 1233)  Resp: 18 (09/01/18 1208)  BP: 133/87 (09/01/18 1232)  SpO2: 98 % (09/01/18 1232) Vital Signs (24h Range):  Temp:  [98.8 °F (37.1 °C)] 98.8 °F (37.1 °C)  Pulse:  [] 102  Resp:  [18] 18  SpO2:  [97 %-99 %] 98 %  BP: (133-153)/(86-89) 133/87     Weight: 64.4 kg (142 lb)  Body mass index is 26.83 kg/m².    FHT: 1 Cat reassuring in OB ED - pt candidate for intermittent ausculation - initiating now   TOCO: Q 2-5 minutes    Physical Exam    Cervix:  Dilation:  3  Effacement:  70  Station: -3  Presentation: Vertex     Significant Labs:  Lab Results   Component Value Date    GROUPTRH A POS 08/22/2018    HEPBSAG Negative 01/23/2018    STREPBCULT  08/07/2018     STREPTOCOCCUS AGALACTIAE (GROUP B)  Beta-hemolytic streptococci are routinely susceptible to    penicillins,cephalosporins and carbapenems.         I have personallly reviewed all pertinent lab results from the last 24 hours.

## 2018-09-01 NOTE — ASSESSMENT & PLAN NOTE
Pitocin augmentation at this time. Will continue to monitor maternal / fetal status closely and collaboration with Siria KAPLAN.

## 2018-09-01 NOTE — SUBJECTIVE & OBJECTIVE
Interval History:  Ratna is a 27 y.o.  at 39w3d. She is doing well. She has been ambulatory and states her contractions are varying in strength. She denies HA, visual changes, and RUQ pain.     Objective:     Vital Signs (Most Recent):  Temp: 97.5 °F (36.4 °C) (18 1635)  Pulse: 107 (18 1655)  Resp: 16 (18 1655)  BP: (!) 126/96 (18 1655)  SpO2: 97 % (18 1655) Vital Signs (24h Range):  Temp:  [97.2 °F (36.2 °C)-98.8 °F (37.1 °C)] 97.5 °F (36.4 °C)  Pulse:  [] 107  Resp:  [16-18] 16  SpO2:  [97 %-99 %] 97 %  BP: (112-153)/(77-96) 126/96     Weight: 64.4 kg (142 lb)  Body mass index is 26.83 kg/m².    FHT: 1 Cat - reassuring  TOCO: Q 3-4 minutes, palpating mild to moderate    Cervical Exam:  Dilation:  3  Effacement:  70  Station: -3  Presentation: Vertex     Significant Labs:  Lab Results   Component Value Date    GROUPTRH A POS 2018    HEPBSAG Negative 2018    STREPBCULT  2018     STREPTOCOCCUS AGALACTIAE (GROUP B)  Beta-hemolytic streptococci are routinely susceptible to   penicillins,cephalosporins and carbapenems.         I have personallly reviewed all pertinent lab results from the last 24 hours.    Physical Exam:   Constitutional: She is oriented to person, place, and time. She appears well-developed and well-nourished. No distress.    HENT:   Head: Normocephalic and atraumatic.     Neck: Normal range of motion.    Cardiovascular: Regular rhythm.    Tachycardia - asymptomatic. Pt is ambulatory and exerting herself.     Pulmonary/Chest: Effort normal. No respiratory distress.        Abdominal: Soft. There is no tenderness.     Genitourinary: Uterus normal. Vaginal discharge (amniotic fluid clear ) found.           Musculoskeletal: Normal range of motion. She exhibits no tenderness.       Neurological: She is alert and oriented to person, place, and time. She has normal reflexes.    Skin: Skin is warm and dry. She is not diaphoretic.    Psychiatric: She  has a normal mood and affect. Her behavior is normal. Judgment and thought content normal.

## 2018-09-01 NOTE — HOSPITAL COURSE
2018: Pt admitted d/t SROM and GBS + for pcn prophylaxis. She is in latent labor but andry q2-5 minutes apart and prefers expectant mgmt at this time.   2018 @ 1720: BP - HTN with elevated PC ratio - pre-eclampsia diagnosis / collaborating with MD for mgmt. Pitocin augmentation initiated at this time.   2018 @ 0035:    2018: Normal PP course - d/c home.

## 2018-09-01 NOTE — ED NOTES
Pt presents to the [facility] complaining of ruptured membranes and contractions. Pt states her water bag broke this morning around 0500 and the fluid was clear. Pt does not know how often she is andry but states they are coming regularly and are strong. Pt states she has had some pink colored discharge, but otherwise no vaginal bleeding. Pt reports positive fetal movement. Pt placed on fetal monitor, CNM notified.

## 2018-09-01 NOTE — TELEPHONE ENCOUNTER
Spoke with pt regarding SROM at 0515 this morning. She states she was able to fall back asleep and has just woken up with contractions about 8 minutes apart.She reports copious clear fluid, +FM and denies fever. Reviewed with pt that because she's GBS +, I'd recommend she come to hospital at this time for IV abx to decrease infection risk. Reviewed policies regarding admission to birth center vs L&D depending on active labor. Offered augmentation with po cytotec if not in labor when she presents to hospital. Reviewed intention to decrease VE. Pt v/u. Pt states she does not want augmentation at this time but is open to initiating abx. She states she will get dressed and come to hospital.     Amanda Bonds CNM  9/1/2018 @ 1550

## 2018-09-01 NOTE — TELEPHONE ENCOUNTER
"Ret pt call  Says her "water broke" at 5:15 am  +FM clear fluid no VB.  Says she has had no ctx.  Wants to stay home for awhile until ctx start.  She agrees to make sure the fluid is clear, she is afebrile and the baby is moving  She agrees and says she will come in when ctx. Start, fluid color changes or decreased fetal movement.  "

## 2018-09-01 NOTE — ED PROVIDER NOTES
"Encounter Date: 9/1/2018       History     Chief Complaint   Patient presents with    Rupture of Membranes    Contractions     Pt presents to OB ED for SROM @ 0515 this morning. She states she was able to sleep following rupture but woke up at 0830 with contractions that are "strong and 4-8 minutes apart". She reports a large gush of clear fluid at 0515 and has been continuously leaking clear fluid since that time with occasional "gushes". She reports pink tinged mucus discharge also. She denies headache, visual changes or RUQ pain. She is GBS +, so encouraged her to come in for evaluation and antibiotics. Offered augmentation but pt prefers expectant mgmt at this time.           Review of patient's allergies indicates:  No Known Allergies  Past Medical History:   Diagnosis Date    Breast disorder     Benign breast cysts; negative imaging    Cholestasis during pregnancy in third trimester 8/22/2018    Postpartum depression     Used seroquil x ~ 1 month     Past Surgical History:   Procedure Laterality Date    BREAST SURGERY      augmentation bilateral    CHOLECYSTECTOMY      WISDOM TOOTH EXTRACTION       Family History   Problem Relation Age of Onset    Heart disease Mother     Heart attacks under age 50 Mother     No Known Problems Father     Hyperlipidemia Sister     Heart disease Paternal Grandfather     No Known Problems Paternal Grandmother     No Known Problems Maternal Grandmother     Breast cancer Neg Hx     Ovarian cancer Neg Hx     Colon cancer Neg Hx      Social History     Tobacco Use    Smoking status: Never Smoker    Smokeless tobacco: Never Used   Substance Use Topics    Alcohol use: Yes     Comment: none since pregnancy    Drug use: No     Review of Systems   Constitutional: Negative for chills and fever.   Eyes: Negative for visual disturbance.   Respiratory: Negative for cough, chest tightness and shortness of breath.    Cardiovascular: Negative for chest pain and leg " swelling.   Gastrointestinal: Positive for abdominal pain (contractions ). Negative for constipation, diarrhea, nausea and vomiting.   Genitourinary: Positive for vaginal discharge (clear amniotic fluid and pink tinged mucus discharge ). Negative for genital sores.   Neurological: Negative for dizziness, light-headedness and headaches.       Physical Exam     Initial Vitals   BP Pulse Resp Temp SpO2   09/01/18 1208 09/01/18 1207 09/01/18 1208 09/01/18 1208 09/01/18 1207   (!) 153/87 99 18 98.8 °F (37.1 °C) 97 %      MAP       --                Initial BP taken during ctx. Repeat /86 and 138/89 and 133/87. Pt denies HA, visual changes, RUQ pain.       Physical Exam    Constitutional: She appears well-developed and well-nourished. No distress.   HENT:   Head: Normocephalic and atraumatic.   Neck: Normal range of motion.   Cardiovascular: Normal rate, regular rhythm and normal heart sounds.   Pulmonary/Chest: Breath sounds normal. No respiratory distress.   Abdominal: Soft. There is no tenderness.   Between ctx   Genitourinary: Uterus normal. Vaginal discharge (grossly ruptured for clear fluid ) found.   Genitourinary Comments: Vertex by leopold's   EFW 7 lbs 8 oz    Musculoskeletal: Normal range of motion. She exhibits no edema or tenderness.   Neurological: She is alert and oriented to person, place, and time.   Skin: Skin is warm and dry.   Psychiatric: She has a normal mood and affect. Her behavior is normal. Judgment and thought content normal.     FHT Cat 1 Reassuring   [place]: q3-5 palpating mild to moderate    VE: 3/70/-3 cranial sutures palpated     ED Course   Procedures  Labs Reviewed - No data to display       Imaging Results    None                       Attending Attestation:   Physician Attestation Statement for Resident:  As the supervising MD   Physician Attestation Statement: I have personally seen and examined this patient.   I agree with the above history. -:   As the supervising MD I agree with  the above PE.    As the supervising MD I agree with the above treatment, course, plan, and disposition.   -:   NST  I independently reviewed the fetal non-stress test with the following interpretation:  130 BPM baseline  Variability: moderate  Accelerations: present  Decelerations: absent  Contractions: Q 5 min  Category 1    Clinical Interpretation:reactive    Patient evaluated and found to be stable, agree with CNDM's assessment and plan to admit to L+D.  I was personally present during the critical portions of the procedure(s) performed by the resident and was immediately available in the ED to provide services and assistance as needed during the entire procedure.  I have reviewed the following: old records at this facility.                       Clinical Impression:   28 y/o  in latent labor  GBS +    Admit to L&D for PCN prophylaxis and expectant mgmt at this time - pt declines augmentation                          Amanda Bonds CNM  [id number]       Leeann Chahal MD  [id number]

## 2018-09-01 NOTE — H&P
"[facility]  Obstetrics  History & Physical    Patient Name: Ratna Hanson  MRN: [id number]  Admission Date: 2018  Primary Care Provider: Primary Doctor No    Subjective:     Principal Problem:Rupture of membranes with clear amniotic fluid    History of Present Illness:  Pt presents to OB ED for SROM @ 0515 this morning. She states she was able to sleep following rupture but woke up at 0830 with contractions that are "strong and 4-8 minutes apart". She reports a large gush of clear fluid at 0515 and has been continuously leaking clear fluid since that time with occasional "gushes". She reports pink tinged mucus discharge also. She denies headache, visual changes or RUQ pain. She is GBS +, so encouraged her to come in for evaluation and antibiotics. Offered augmentation but pt prefers expectant mgmt at this time.         Obstetric HPI:  Patient reports Date/time of onset: 2018 @ 0800, Frequency: Every 4-8 minutes, Duration: 45-60 seconds and Intensity: strong contractions, active fetal movement, No vaginal bleeding , Yes loss of fluid     This pregnancy has been complicated by nausea and vomiting throughout pregnancy, hx PPD, hx VAVD, GBS +, and itching that resolved with normal bile acids.     Obstetric History       T1      L1     SAB3   TAB0   Ectopic0   Multiple0   Live Births1       # Outcome Date GA Lbr Albaro/2nd Weight Sex Delivery Anes PTL Lv   5 Current            4 SAB 17 4w0d          3 SAB 17 4w0d          2 SAB 10/31/16 5w0d          1 Term 06 38w0d  2.948 kg (6 lb 8 oz) M Vag-Vacuum EPI  SOWMYA      Name: Gabo        Past Medical History:   Diagnosis Date    Breast disorder     Benign breast cysts; negative imaging    Cholestasis during pregnancy in third trimester 2018    Postpartum depression     Used seroquil x ~ 1 month     Past Surgical History:   Procedure Laterality Date    BREAST SURGERY      augmentation bilateral    CHOLECYSTECTOMY  "     WISDOM TOOTH EXTRACTION         PTA Medications   Medication Sig    diphenhydrAMINE (BENADRYL) 50 MG capsule Take 50 mg by mouth every 6 (six) hours as needed for Itching.    doxylamine-pyridoxine, vit B6, 10-10 mg TbEC Take 2 tablets by mouth every evening. May add one tablet in morning and one in afternoon.    ferrous sulfate 325 mg (65 mg iron) Tab tablet Take 325 mg by mouth daily with breakfast.    metoclopramide HCl (REGLAN) 10 MG tablet Take 1 tablet (10 mg total) by mouth every 6 (six) hours as needed.    PNV COMB NO.59/IRON/FA/DHA (PRENATAL-DHA ORAL) Take by mouth.    PRENATAL VIT/IRON FUM/FOLIC AC (PRENATAL-FOLIC ACID ORAL) Take by mouth.    promethazine (PHENERGAN) 25 MG tablet Take 0.5 tablets (12.5 mg total) by mouth every 4 (four) hours as needed for Nausea.       Review of patient's allergies indicates:  No Known Allergies     Family History     Problem Relation (Age of Onset)    Heart attacks under age 50 Mother    Heart disease Mother, Paternal Grandfather    Hyperlipidemia Sister    No Known Problems Father, Paternal Grandmother, Maternal Grandmother        Tobacco Use    Smoking status: Never Smoker    Smokeless tobacco: Never Used   Substance and Sexual Activity    Alcohol use: Yes     Comment: none since pregnancy    Drug use: No    Sexual activity: Yes     Birth control/protection: None     Comment: planning pregnancy     Review of Systems   Constitutional: Negative for fever.   Eyes: Negative for visual disturbance.   Respiratory: Negative for cough and shortness of breath.    Cardiovascular: Negative for chest pain and leg swelling.   Gastrointestinal: Positive for abdominal pain (contractions ). Negative for constipation, diarrhea, nausea and vomiting.   Genitourinary: Positive for vaginal discharge (clear amniotic fluid - pt grossly ruptured ). Negative for genital sores and vaginal bleeding.   Neurological: Negative for headaches.      Objective:     Vital Signs (Most  Recent):  Temp: 98.8 °F (37.1 °C) (18 1208)  Pulse: 102 (18 1233)  Resp: 18 (18 1208)  BP: 133/87 (18 1232)  SpO2: 98 % (18 1232) Vital Signs (24h Range):  Temp:  [98.8 °F (37.1 °C)] 98.8 °F (37.1 °C)  Pulse:  [] 102  Resp:  [18] 18  SpO2:  [97 %-99 %] 98 %  BP: (133-153)/(86-89) 133/87     Weight: 64.4 kg (142 lb)  Body mass index is 26.83 kg/m².    FHT: 1 Cat reassuring in OB ED - pt candidate for intermittent ausculation - initiating now   TOCO: Q 2-5 minutes    Physical Exam    Cervix:  Dilation:  3  Effacement:  70  Station: -3  Presentation: Vertex     Significant Labs:  Lab Results   Component Value Date    GROUPTRH A POS 2018    HEPBSAG Negative 2018    STREPBCULT  2018     STREPTOCOCCUS AGALACTIAE (GROUP B)  Beta-hemolytic streptococci are routinely susceptible to   penicillins,cephalosporins and carbapenems.         I have personallly reviewed all pertinent lab results from the last 24 hours.    Assessment/Plan:     27 y.o. female  at 39w3d for:    * Rupture of membranes with clear amniotic fluid    Offered augmentation but pt prefers expectant mgmt at this time.         Group B Streptococcus carrier, antepartum    PCN prophylaxis             Amanda Bonds CNM  Obstetrics  [facility]

## 2018-09-01 NOTE — ANESTHESIA PREPROCEDURE EVALUATION
[facility]  Anesthesia Pre-Operative Evaluation       Patient Name: Ratna Hanson  YOB: 1990  MRN: [id number]    SUBJECTIVE:     Ratna Hanson is a 27 y.o. female  39w3d presenting for SROM and contractions.  Patient is a midwife patient.    Current pregnancy complicated by group B strep (started on penicillin).  Patient declining augmentation of labor at this time.      Patient is currently being worked up for PreE.  In the ED, SBP was elevated in 130s, patient attributes it to painful IV placement.  Patient denies headache, visual changes, RUQ pain.  P/C urine ratio labs were sent via [employer] catch and returned 1.47, being re-checked with in/out cath as of 1630 2018.    Denies hx of seizures, strokes, HTN, heart failure, smoking, asthma, COPD, acid reflux, liver disease, kidney disease, bleeding disorders, taking blood thinners, back surgery.  Lab Results   Component Value Date     2018     One previous pregnancy, vacuum assisted vaginal delivery with epidural    For this pregnancy, patient would like to avoid epidural.  Patient understands the need for spinal if she requires a  and the risk of general anesthesia in case of emergent  if she does not have an epidural.    Denies previous issues with neuraxial anesthesia.  Denies previous issues with general anesthesia.  Denies family history of issues with anesthesia.  Past Surgical History:   Procedure Laterality Date    BREAST SURGERY      augmentation bilateral    CHOLECYSTECTOMY      WISDOM TOOTH EXTRACTION          OB History    Para Term  AB Living   5 1 1   3 1   SAB TAB Ectopic Multiple Live Births   3       1      # Outcome Date GA Lbr Albaro/2nd Weight Sex Delivery Anes PTL Lv   5 Current            4 SAB 17 4w0d             Birth Comments: Faint + UPT (not confirmed with US), then started menses when expected   3 SAB 17 4w0d             Birth Comments: Faint + UPT (not  confirmed with US), then started menses when expected   2 SAB 10/31/16 5w0d             Birth Comments: Several + UPTs then started menses at 1 week late then UPT negative   1 Term 03/01/06 38w0d  2.948 kg (6 lb 8 oz) M Vag-Vacuum EPI  SOWMYA      Birth Comments: VAVD for maternal exhaustion          Patient Active Problem List   Diagnosis    Nausea and vomiting during pregnancy prior to 22 weeks gestation    History of 3 SABs in 2017, not medically confirmed    Pelvic pain in pregnancy, antepartum, first trimester    History of postpartum depression, currently pregnant    H/O delivery by vacuum extraction, currently pregnant    Pregnancy with one fetus, antepartum    Abdominal cramping affecting pregnancy    Group B Streptococcus carrier, antepartum    Itching    Rupture of membranes with clear amniotic fluid       Review of patient's allergies indicates:  No Known Allergies    Social History     Socioeconomic History    Marital status:      Spouse name: Not on file    Number of children: Not on file    Years of education: Not on file    Highest education level: Not on file   Social Needs    Financial resource strain: Not on file    Food insecurity - worry: Not on file    Food insecurity - inability: Not on file    Transportation needs - medical: Not on file    Transportation needs - non-medical: Not on file   Occupational History    Not on file   Tobacco Use    Smoking status: Never Smoker    Smokeless tobacco: Never Used   Substance and Sexual Activity    Alcohol use: Yes     Comment: none since pregnancy    Drug use: No    Sexual activity: Yes     Birth control/protection: None     Comment: planning pregnancy   Other Topics Concern    Not on file   Social History Narrative     Gene Owen is their first child together (his first child).      She has a 12 year old, Gabo       OBJECTIVE:     Outpatient Medications:  No current facility-administered medications on file prior  to encounter.      Current Outpatient Medications on File Prior to Encounter   Medication Sig Dispense Refill    diphenhydrAMINE (BENADRYL) 50 MG capsule Take 50 mg by mouth every 6 (six) hours as needed for Itching.      doxylamine-pyridoxine, vit B6, 10-10 mg TbEC Take 2 tablets by mouth every evening. May add one tablet in morning and one in afternoon. 30 tablet 1    ferrous sulfate 325 mg (65 mg iron) Tab tablet Take 325 mg by mouth daily with breakfast.      metoclopramide HCl (REGLAN) 10 MG tablet Take 1 tablet (10 mg total) by mouth every 6 (six) hours as needed. 15 tablet 0    PNV COMB NO.59/IRON/FA/DHA (PRENATAL-DHA ORAL) Take by mouth.      PRENATAL VIT/IRON FUM/FOLIC AC (PRENATAL-FOLIC ACID ORAL) Take by mouth.      promethazine (PHENERGAN) 25 MG tablet Take 0.5 tablets (12.5 mg total) by mouth every 4 (four) hours as needed for Nausea. 30 tablet 0        Current Inpatient Medications:   pencillin G potassium IVPB  2.5 Million Units Intravenous Q4H       Wt Readings from Last 1 Encounters:   09/01/18 1230 64.4 kg (142 lb)       BP Readings from Last 3 Encounters:   09/01/18 (!) 146/77   08/27/18 118/82   08/22/18 131/82         Chemistry        Component Value Date/Time     09/01/2018 1300    K 4.6 09/01/2018 1300     09/01/2018 1300    CO2 19 (L) 09/01/2018 1300    BUN 5 (L) 09/01/2018 1300    CREATININE 0.7 09/01/2018 1300    GLU 92 09/01/2018 1300        Component Value Date/Time    CALCIUM 10.6 (H) 09/01/2018 1300    ALKPHOS 147 (H) 09/01/2018 1300    AST 17 09/01/2018 1300    ALT 12 09/01/2018 1300    BILITOT 0.6 09/01/2018 1300    ESTGFRAFRICA >60 09/01/2018 1300    EGFRNONAA >60 09/01/2018 1300            Lab Results   Component Value Date    WBC 11.25 09/01/2018    HGB 10.8 (L) 09/01/2018    HCT 33.3 (L) 09/01/2018    MCV 90 09/01/2018     09/01/2018       No results for input(s): PT, INR, PROTIME, APTT in the last 72 hours.      Anesthesia Evaluation    I have reviewed  the Patient Summary Reports.    I have reviewed the Nursing Notes.   I have reviewed the Medications.     Review of Systems  Anesthesia Hx:  No problems with previous Anesthesia Denies Hx of Anesthetic complications  History of prior surgery of interest to airway management or planning: Denies Family Hx of Anesthesia complications.   Denies Personal Hx of Anesthesia complications.   Social:  Non-Smoker, No Alcohol Use    Hematology/Oncology:  Hematology Normal   Oncology Normal     EENT/Dental:EENT/Dental Normal   Cardiovascular:   Exercise tolerance: good Denies Pacemaker.  Denies Hypertension.  Denies Valvular problems/Murmurs.  Denies MI.  Denies CAD.    Denies CABG/stent.  Denies Dysrhythmias.   Denies Angina.         ECG has been reviewed.    Pulmonary:  Pulmonary Normal  Denies COPD.  Denies Asthma.  Denies Sleep Apnea.    Renal/:  Renal/ Normal  Denies Chronic Renal Disease.     Hepatic/GI:  Hepatic/GI Normal  Denies GERD. Denies Liver Disease.    Musculoskeletal:  Musculoskeletal Normal    Neurological:  Neurology Normal  Denies CVA. Denies Seizures.    Endocrine:  Endocrine Normal Denies Diabetes. Denies Hypothyroidism.    Dermatological:  Skin Normal    Psych:   Psychiatric History Hx of post partum depression         Physical Exam  General:  Well nourished    Airway/Jaw/Neck:  Airway Findings: Mouth Opening: Normal Tongue: Normal  General Airway Assessment: Adult  Mallampati: II  TM Distance: Normal, at least 6 cm  Jaw/Neck Findings:  Neck ROM: Normal ROM  Neck Findings: Normal    Eyes/Ears/Nose:  EYES/EARS/NOSE FINDINGS: Normal   Dental:  Dental Findings: In tact   Chest/Lungs:  Chest/Lungs Findings: Clear to auscultation, Normal Respiratory Rate     Heart/Vascular:  Heart Findings: Rate: Normal  Rhythm: Regular Rhythm  Sounds: Normal  Heart murmur: negative       Mental Status:  Mental Status Findings:  Cooperative, Alert and Oriented         Anesthesia Plan  Type of Anesthesia, risks & benefits  discussed:  Anesthesia Type:  epidural, CSE, general, spinal  Patient's Preference:   Intra-op Monitoring Plan: standard ASA monitors  Intra-op Monitoring Plan Comments:   Post Op Pain Control Plan: multimodal analgesia, IV/PO Opioids PRN and per primary service following discharge from PACU  Post Op Pain Control Plan Comments:   Induction:    Beta Blocker:  Patient is not currently on a Beta-Blocker (No further documentation required).       Informed Consent: Patient understands risks and agrees with Anesthesia plan.  Questions answered. Anesthesia consent signed with patient.  ASA Score: 2     Day of Surgery Review of History & Physical:  There are no significant changes.  H&P update referred to the surgeon.         Ready For Surgery From Anesthesia Perspective.

## 2018-09-01 NOTE — PROGRESS NOTES
[facility]  Obstetrics  Labor Progress Note    Patient Name: Ratna Hanson  MRN: [id number]  Admission Date: 2018  Hospital Length of Stay: 0 days  Attending Physician: No att. providers found  Primary Care Provider: Primary Doctor No    Subjective:     Principal Problem:Rupture of membranes with clear amniotic fluid    Hospital Course:  2018: Pt admitted d/t SROM and GBS + for pcn prophylaxis. She is in latent labor but andry q2-5 minutes apart and prefers expectant mgmt at this time.   2018 @ 1720: BP - HTN with elevated PC ratio - pre-eclampsia diagnosis / collaborating with MD for mgmt. Pitocin augmentation initiated at this time.     Interval History:  Ratna is a 27 y.o.  at 39w3d. She is doing well. She has been ambulatory and states her contractions are varying in strength. She denies HA, visual changes, and RUQ pain.     Objective:     Vital Signs (Most Recent):  Temp: 97.5 °F (36.4 °C) (18 1635)  Pulse: 107 (18 1655)  Resp: 16 (18 1655)  BP: (!) 126/96 (18 1655)  SpO2: 97 % (18 1655) Vital Signs (24h Range):  Temp:  [97.2 °F (36.2 °C)-98.8 °F (37.1 °C)] 97.5 °F (36.4 °C)  Pulse:  [] 107  Resp:  [16-18] 16  SpO2:  [97 %-99 %] 97 %  BP: (112-153)/(77-96) 126/96     Weight: 64.4 kg (142 lb)  Body mass index is 26.83 kg/m².    FHT: 1 Cat - reassuring  TOCO: Q 3-4 minutes, palpating mild to moderate    Cervical Exam:  Dilation:  3  Effacement:  70  Station: -3  Presentation: Vertex     Significant Labs:  Lab Results   Component Value Date    GROUPTRH A POS 2018    HEPBSAG Negative 2018    STREPBCULT  2018     STREPTOCOCCUS AGALACTIAE (GROUP B)  Beta-hemolytic streptococci are routinely susceptible to   penicillins,cephalosporins and carbapenems.         I have personallly reviewed all pertinent lab results from the last 24 hours.    Physical Exam:   Constitutional: She is oriented to person, place, and time. She  appears well-developed and well-nourished. No distress.    HENT:   Head: Normocephalic and atraumatic.     Neck: Normal range of motion.    Cardiovascular: Regular rhythm.    Tachycardia - asymptomatic. Pt is ambulatory and exerting herself.     Pulmonary/Chest: Effort normal. No respiratory distress.        Abdominal: Soft. There is no tenderness.     Genitourinary: Uterus normal. Vaginal discharge (amniotic fluid clear ) found.           Musculoskeletal: Normal range of motion. She exhibits no tenderness.       Neurological: She is alert and oriented to person, place, and time. She has normal reflexes.    Skin: Skin is warm and dry. She is not diaphoretic.    Psychiatric: She has a normal mood and affect. Her behavior is normal. Judgment and thought content normal.       Assessment/Plan:     27 y.o. female  at 39w3d for:    * Rupture of membranes with clear amniotic fluid    Offered augmentation but pt prefers expectant mgmt at this time.         Pre-eclampsia affecting pregnancy, antepartum    Pitocin augmentation at this time. Will continue to monitor maternal / fetal status closely and collaboration with Siria KAPLAN.         Group B Streptococcus carrier, antepartum    PCN prophylaxis               Amanda Bonds CNM  Obstetrics  [facility]

## 2018-09-01 NOTE — HPI
"Pt presents to OB ED for SROM @ 0515 this morning. She states she was able to sleep following rupture but woke up at 0830 with contractions that are "strong and 4-8 minutes apart". She reports a large gush of clear fluid at 0515 and has been continuously leaking clear fluid since that time with occasional "gushes". She reports pink tinged mucus discharge also. She denies headache, visual changes or RUQ pain. She is GBS +, so encouraged her to come in for evaluation and antibiotics. Offered augmentation but pt prefers expectant mgmt at this time.       "

## 2018-09-02 PROBLEM — Z3A.39 39 WEEKS GESTATION OF PREGNANCY: Status: ACTIVE | Noted: 2018-09-02

## 2018-09-02 PROCEDURE — 25000003 PHARM REV CODE 250: Performed by: STUDENT IN AN ORGANIZED HEALTH CARE EDUCATION/TRAINING PROGRAM

## 2018-09-02 PROCEDURE — 72200005 HC VAGINAL DELIVERY LEVEL II

## 2018-09-02 PROCEDURE — 11000001 HC ACUTE MED/SURG PRIVATE ROOM

## 2018-09-02 PROCEDURE — 25000003 PHARM REV CODE 250: Performed by: ADVANCED PRACTICE MIDWIFE

## 2018-09-02 PROCEDURE — 59400 OBSTETRICAL CARE: CPT | Mod: ,,, | Performed by: ADVANCED PRACTICE MIDWIFE

## 2018-09-02 RX ORDER — ACETAMINOPHEN 325 MG/1
650 TABLET ORAL EVERY 6 HOURS PRN
Status: DISCONTINUED | OUTPATIENT
Start: 2018-09-02 | End: 2018-09-04 | Stop reason: HOSPADM

## 2018-09-02 RX ORDER — ONDANSETRON 8 MG/1
8 TABLET, ORALLY DISINTEGRATING ORAL EVERY 8 HOURS PRN
Status: DISCONTINUED | OUTPATIENT
Start: 2018-09-02 | End: 2018-09-04 | Stop reason: HOSPADM

## 2018-09-02 RX ORDER — HYDROCORTISONE 25 MG/G
CREAM TOPICAL 3 TIMES DAILY PRN
Status: DISCONTINUED | OUTPATIENT
Start: 2018-09-02 | End: 2018-09-04 | Stop reason: HOSPADM

## 2018-09-02 RX ORDER — IBUPROFEN 600 MG/1
600 TABLET ORAL EVERY 6 HOURS
Status: DISCONTINUED | OUTPATIENT
Start: 2018-09-02 | End: 2018-09-04 | Stop reason: HOSPADM

## 2018-09-02 RX ORDER — DIPHENHYDRAMINE HCL 25 MG
25 CAPSULE ORAL EVERY 4 HOURS PRN
Status: DISCONTINUED | OUTPATIENT
Start: 2018-09-02 | End: 2018-09-04 | Stop reason: HOSPADM

## 2018-09-02 RX ORDER — OXYTOCIN/RINGER'S LACTATE 20/1000 ML
41.65 PLASTIC BAG, INJECTION (ML) INTRAVENOUS CONTINUOUS
Status: ACTIVE | OUTPATIENT
Start: 2018-09-02 | End: 2018-09-02

## 2018-09-02 RX ORDER — HYDROCODONE BITARTRATE AND ACETAMINOPHEN 5; 325 MG/1; MG/1
1 TABLET ORAL EVERY 4 HOURS PRN
Status: DISCONTINUED | OUTPATIENT
Start: 2018-09-02 | End: 2018-09-04 | Stop reason: HOSPADM

## 2018-09-02 RX ORDER — DOCUSATE SODIUM 100 MG/1
200 CAPSULE, LIQUID FILLED ORAL 2 TIMES DAILY PRN
Status: DISCONTINUED | OUTPATIENT
Start: 2018-09-02 | End: 2018-09-04 | Stop reason: HOSPADM

## 2018-09-02 RX ORDER — DIPHENHYDRAMINE HYDROCHLORIDE 50 MG/ML
25 INJECTION INTRAMUSCULAR; INTRAVENOUS EVERY 4 HOURS PRN
Status: DISCONTINUED | OUTPATIENT
Start: 2018-09-02 | End: 2018-09-04 | Stop reason: HOSPADM

## 2018-09-02 RX ADMIN — HYDROCODONE BITARTRATE AND ACETAMINOPHEN 1 TABLET: 5; 325 TABLET ORAL at 01:09

## 2018-09-02 RX ADMIN — IBUPROFEN 600 MG: 600 TABLET ORAL at 12:09

## 2018-09-02 RX ADMIN — ACETAMINOPHEN 650 MG: 325 TABLET ORAL at 02:09

## 2018-09-02 RX ADMIN — HYDROCODONE BITARTRATE AND ACETAMINOPHEN 1 TABLET: 5; 325 TABLET ORAL at 03:09

## 2018-09-02 RX ADMIN — IBUPROFEN 600 MG: 600 TABLET ORAL at 06:09

## 2018-09-02 RX ADMIN — IBUPROFEN 600 MG: 600 TABLET ORAL at 05:09

## 2018-09-02 NOTE — PROGRESS NOTES
[facility]  Obstetrics  Labor Progress Note    Patient Name: Ratna Hanson  MRN: [id number]  Admission Date: 2018  Hospital Length of Stay: 0 days  Attending Physician: Gabriela Beverly DO  Primary Care Provider: Primary Doctor No    Subjective:     Principal Problem:Rupture of membranes with clear amniotic fluid    Hospital Course:  2018: Pt admitted d/t SROM and GBS + for pcn prophylaxis. She is in latent labor but andry q2-5 minutes apart and prefers expectant mgmt at this time.   2018 @ 1720: BP - HTN with elevated PC ratio - pre-eclampsia diagnosis / collaborating with MD for mgmt. Pitocin augmentation initiated at this time.     Interval History:  Ratna is a 27 y.o.  at 39w3d. She is doing well. She has used hydrotherapy and is ambulating She is breathing through ctx with partner, friend, RN and CNM at bedside offering support. She is nauseous with occasional contractions.     Objective:     Vital Signs (Most Recent):  Temp: 97.5 °F (36.4 °C) (18 1635)  Pulse: (!) 121 (18 1829)  Resp: 16 (18 1829)  BP: 131/83 (18 1829)  SpO2: 100 % (18 1842) Vital Signs (24h Range):  Temp:  [97.2 °F (36.2 °C)-98.8 °F (37.1 °C)] 97.5 °F (36.4 °C)  Pulse:  [] 121  Resp:  [16-18] 16  SpO2:  [97 %-100 %] 100 %  BP: (112-153)/(77-96) 131/83     Weight: 64.4 kg (142 lb)  Body mass index is 26.83 kg/m².    FHT: Cat 2 overall reassuring, moderate btbv, + accels, intermittent variable and early decels   TOCO:  Q 2-4 minutes    Cervical Exam:  Dilation:  7  Effacement:  90  Station: -1  Presentation: Vertex     Significant Labs:  Lab Results   Component Value Date    GROUPTRH A POS 2018    HEPBSAG Negative 2018    STREPBCULT  2018     STREPTOCOCCUS AGALACTIAE (GROUP B)  Beta-hemolytic streptococci are routinely susceptible to   penicillins,cephalosporins and carbapenems.         I have personallly reviewed all pertinent lab results from the  last 24 hours.    Physical Exam:   Constitutional: She appears well-developed and well-nourished. No distress.    HENT:   Head: Normocephalic and atraumatic.     Neck: Normal range of motion.    Cardiovascular: Regular rhythm.    Tachycardic with ctx          Abdominal: Soft.     Genitourinary: Uterus normal. Vaginal discharge (amniotic fluid and bloody show ) found.           Musculoskeletal: Normal range of motion and moves all extremeties.       Neurological: She is alert. She has normal reflexes.    Skin: Skin is warm and dry.    Psychiatric: She has a normal mood and affect. Her behavior is normal. Judgment and thought content normal.       Assessment/Plan:     27 y.o. female  at 39w3d for:    * Rupture of membranes with clear amniotic fluid    Augmentation with Pitocin - anticipate         Pre-eclampsia affecting pregnancy, antepartum    Pitocin augmentation at this time. Will continue to monitor maternal / fetal status closely and collaboration with Siria KAPLAN.         Group B Streptococcus carrier, antepartum    PCN prophylaxis               Amanda Bonds CNM  Obstetrics  [facility]

## 2018-09-02 NOTE — PROGRESS NOTES
[facility]  Vaginal Delivery Progress Note  Obstetrics    SUBJECTIVE:     Ratna Hanson is a 27 y.o. female PPD #0 status post Spontaneous vaginal delivery at 39w4d in a pregnancy complicated by GHTN. Patient is doing well this morning. She denies nausea, vomiting, fever or chills. Patient reports mild abdominal pain that is well relieved by oral pain medications. Lochia is mild to moderate  and decreasing. Patient is voiding without difficulty and ambulating with no difficulty. She has passed flatus and has not had a BM. Patient does plan to breast feed. micronor for contraception. She N/A desires circumcision.    OBJECTIVE:     Vital Signs Ranges:  Temp:  [97 °F (36.1 °C)-99 °F (37.2 °C)] 98.2 °F (36.8 °C)  Pulse:  [] 92  Resp:  [16-20] 18  SpO2:  [97 %-100 %] 97 %  BP: (112-153)/(68-96) 126/80    I/O (Last 24H):    Intake/Output Summary (Last 24 hours) at 9/2/2018 1122  Last data filed at 9/2/2018 0610  Gross per 24 hour   Intake 151.2 ml   Output 1800 ml   Net -1648.8 ml       Physical Exam:  General:    alert, appears stated age and cooperative   Lungs:  normal effort   Heart:  normal apical pulse   Abdomen:  normal findings: soft, non-tender   Uterine Size:  firm located 2 FB below the umbilicus.   Incision:  N/A   Extremities:   peripheral pulses normal, no pedal edema, no clubbing or cyanosis, no pedal edema noted     Lab Review:   [unfilled]    ASSESSMENT:     Assessment:  Active Hospital Problems    Diagnosis    *Rupture of membranes with clear amniotic fluid     Offered augmentation but pt prefers expectant mgmt at this time.       Normal spontaneous vaginal delivery    39 weeks gestation of pregnancy    Fetal distress in liveborn infant    Pre-eclampsia affecting pregnancy, antepartum     Pitocin augmentation at this time. Will continue to monitor maternal / fetal status closely and collaboration with Siria KAPLAN.       Normal labor     Nitrous for pain relief per anesthesia        Group B Streptococcus carrier, antepartum     PCN prophylaxis         PLAN:     Plan:  1. Postpartum care:   - Patient doing well. Continue routine management and advances.   - Continue PO pain meds. Pain well controlled.   - Encourage ambulation   - Circumcision N/A   - Contraception micronor   - Lactation breastfeeding                 Monitor B/Pclosely    2. PP day0 stable    Disposition: As patient meets milestones, will plan to discharge tomorrow or day after.

## 2018-09-02 NOTE — PROGRESS NOTES
Call received from RN at 0347 regarding pt bleeding and boggy uterus. Advised RN I was no longer in house but could be at pt's bedside in 15-20 minutes. She offered to call residents to assess bleeding immediately. Agreed with RN that this was most prudent plan. She called and residents assessed - no abnormal bleeding noted with their exam. Please continue close monitoring.     Amanda Bonds CNM

## 2018-09-02 NOTE — L&D DELIVERY NOTE
[facility]  Vaginal Delivery   Operative Note    SUMMARY     Normal spontaneous vaginal delivery of live female infant, Amy skin to skin was unable to be performed due to baby taken to warmer immediately at delivery for evaluation and PPV. Delivery was complicated by a shoulder dystocia following a slow crown on hands and knees. No restitution or movement of baby following gentle traction. Mother was moved to back and Sid performed. Anterior shoulder released and loose nuchal cord unwrapped prior to delivery of body. Shoulder dystocia was < 1 minute in length.   Infant delivered position OA over intact perineum.  Nuchal cord: Yes, cord reduced at perineum.    Spontaneous delivery of placenta and IV pitocin given noting good uterine tone.  left labial laceration noted - hemostatic. Pt declines suturing. .  Patient tolerated delivery well. Sponge needle and lap counted correctly x2.    Indications: Rupture of membranes with clear amniotic fluid  Pregnancy complicated by:   Patient Active Problem List   Diagnosis    Nausea and vomiting during pregnancy prior to 22 weeks gestation    History of 3 SABs in 2017, not medically confirmed    Pelvic pain in pregnancy, antepartum, first trimester    History of postpartum depression, currently pregnant    H/O delivery by vacuum extraction, currently pregnant    Pregnancy with one fetus, antepartum    Abdominal cramping affecting pregnancy    Group B Streptococcus carrier, antepartum    Itching    Rupture of membranes with clear amniotic fluid    Pre-eclampsia affecting pregnancy, antepartum    Normal labor    Normal spontaneous vaginal delivery    Fetal distress in liveborn infant     Admitting GA: 39w4d    Delivery Information for  Devyn Hanson    Birth information:  YOB: 2019   Time of birth: 12:35 AM   Sex: female   Head Delivery Date/Time: 9/2/2018 12:34 AM   Delivery type: Vaginal, Spontaneous Delivery    Gestational Age: 39w4d    Delivery Providers    Delivering clinician:  Amanda Bonds CNM   Provider Role    Mary Weclh, RN Registered Nurse    Carolynn Negron, RN Registered Nurse            Measurements    Weight:    Length:           Apgars    Living status:  Living  Apgars:   1 min.:   5 min.:   10 min.:   15 min.:   20 min.:     Skin color:   0  1       Heart rate:   2  2       Reflex irritability:   0  2       Muscle tone:   0  2       Respiratory effort:   0  2       Total:   2  9              Operative Delivery    Forceps attempted?:  No  Vacuum extractor attempted?:  No         Shoulder Dystocia    Shoulder dystocia present?:  Yes  Anterior shoulder:  left  Time recognized:  2018 00:34:00  Time help called:  2018 00:34:00   Help called by:  Carolynn Negron   NICU arrived:  2018 00:37:00   Additional staff arrived:  2018 00:36:00   First maneuver:  Dudley maneuver  Time performed:  2018 00:34:00  Performed by:  Amanda Bonds CNM   Second maneuver:  Sid maneuver  Time performed:  2018 00:35:00  Performed by:  Amanda Bonsd CNM            Presentation    Presentation:  Vertex  Position:  Middle Occiput Anterior           Interventions/Resuscitation    Method:  Tactile Stimulation, PPV       Cord    Vessels:  3 vessels  Complications:  Nuchal  Nuchal Intervention:  reduced  Nuchal Cord Description:  loose nuchal cord  Number of Loops:  1  Delayed Cord Clamping?:  No  Cord Clamped Date/Time:  2018 12:35 AM  Cord Blood Disposition:  Sent with Baby  Gases Sent?:  No  Stem Cell Collection (by MD):  No       Placenta    Placenta delivery date/time:  2018 0102  Placenta removal:  Spontaneous  Placenta appearance:  Intact  Placenta disposition:  discarded           Labor Events:       labor: No     Labor Onset Date/Time:         Dilation Complete Date/Time:         Start Pushing Date/Time:       Rupture Date/Time:              Rupture type:           Fluid Amount:  moderate      Fluid Color: clear      Fluid Odor: none      Membrane Status (PeriCalm):        Rupture Date/Time (PeriCalm):        Fluid Amount (PeriCalm):        Fluid Color (PeriCalm):         steroids: None     Antibiotics given for GBS: Yes     Induction: oxytocin     Indications for induction:  Premature ROM     Augmentation: oxytocin     Indications for augmentation: Ineffective Contraction Pattern     Labor complications: Shoulder Dystocia     Additional complications:          Cervical ripening:                     Delivery:      Episiotomy: None     Indication for Episiotomy:       Perineal Lacerations: None Repaired:      Periurethral Laceration: none Repaired:     Labial Laceration: left Repaired: No   Sulcus Laceration: none Repaired:     Vaginal Laceration: Yes Repaired: No   Cervical Laceration: No Repaired:     Repair suture:       Repair # of packets: 0     Vaginal delivery QBL (mL): 300      QBL (mL): 0     Combined Blood Loss (mL): 300     Vaginal Sweep Performed: Yes     Surgicount Correct: Yes       Other providers:       Anesthesia    Method:  None          Details (if applicable):  Trial of Labor      Categorization:      Priority:     Indications for :     Incision Type:       Additional  information:  Forceps:    Vacuum:    Breech:    Observed anomalies    Other (Comments):

## 2018-09-02 NOTE — PROGRESS NOTES
Patient received from labor at 0325, patient reports feeling increased bleeding, and assisted to the restroom to void 500. Pad moderately/heavily saturated. Patient assisted back to bed, boggy but firms with massage. Bedside report complete. Patient trickling blood with fundal massage. Continuous massage to keep uterus firm.  Small clot noted in vaginal opening, call placed to Midwife (unavailable) Call placed to resident, Dr Ye to bedside. Additional massage provided no additional bleeding. Educated patient to call with increased bleeding and pain. Patient then oriented to room, educated on breast feeding. Will continue to monitor and maintain safety.

## 2018-09-02 NOTE — SUBJECTIVE & OBJECTIVE
Interval History:  Ratna is a 27 y.o.  at 39w3d. She is doing well. She has used hydrotherapy and is ambulating She is breathing through ctx with partner, friend, RN and CNM at bedside offering support. She is nauseous with occasional contractions.     Objective:     Vital Signs (Most Recent):  Temp: 97.5 °F (36.4 °C) (18 1635)  Pulse: (!) 121 (18 182)  Resp: 16 (18)  BP: 131/83 (18)  SpO2: 100 % (18 1842) Vital Signs (24h Range):  Temp:  [97.2 °F (36.2 °C)-98.8 °F (37.1 °C)] 97.5 °F (36.4 °C)  Pulse:  [] 121  Resp:  [16-18] 16  SpO2:  [97 %-100 %] 100 %  BP: (112-153)/(77-96) 131/83     Weight: 64.4 kg (142 lb)  Body mass index is 26.83 kg/m².    FHT: Cat 2 overall reassuring, moderate btbv, + accels, intermittent variable and early decels   TOCO:  Q 2-4 minutes    Cervical Exam:  Dilation:  7  Effacement:  90  Station: -1  Presentation: Vertex     Significant Labs:  Lab Results   Component Value Date    GROUPTRH A POS 2018    HEPBSAG Negative 2018    STREPBCULT  2018     STREPTOCOCCUS AGALACTIAE (GROUP B)  Beta-hemolytic streptococci are routinely susceptible to   penicillins,cephalosporins and carbapenems.         I have personallly reviewed all pertinent lab results from the last 24 hours.    Physical Exam:   Constitutional: She appears well-developed and well-nourished. No distress.    HENT:   Head: Normocephalic and atraumatic.     Neck: Normal range of motion.    Cardiovascular: Regular rhythm.    Tachycardic with ctx          Abdominal: Soft.     Genitourinary: Uterus normal. Vaginal discharge (amniotic fluid and bloody show ) found.           Musculoskeletal: Normal range of motion and moves all extremeties.       Neurological: She is alert. She has normal reflexes.    Skin: Skin is warm and dry.    Psychiatric: She has a normal mood and affect. Her behavior is normal. Judgment and thought content normal.

## 2018-09-02 NOTE — PROGRESS NOTES
MD called to bedside by nursing staff to assess bleeding. Upon arrival patient not actively bleeding. One 1 cm clot removed from labial folds. Otherwise normal lochia present. Patient stable with vital signs within normal limits. Alert and oriented. Counseled to call if bleeding worsens.     Morelia Ye MD  OBGYN, PGY-1

## 2018-09-03 PROBLEM — Z3A.39 39 WEEKS GESTATION OF PREGNANCY: Status: RESOLVED | Noted: 2018-09-02 | Resolved: 2018-09-03

## 2018-09-03 PROBLEM — Z37.9 NORMAL LABOR: Status: RESOLVED | Noted: 2018-09-01 | Resolved: 2018-09-03

## 2018-09-03 PROCEDURE — 11000001 HC ACUTE MED/SURG PRIVATE ROOM

## 2018-09-03 PROCEDURE — 25000003 PHARM REV CODE 250: Performed by: ADVANCED PRACTICE MIDWIFE

## 2018-09-03 PROCEDURE — 25000003 PHARM REV CODE 250: Performed by: STUDENT IN AN ORGANIZED HEALTH CARE EDUCATION/TRAINING PROGRAM

## 2018-09-03 RX ADMIN — IBUPROFEN 600 MG: 600 TABLET ORAL at 12:09

## 2018-09-03 RX ADMIN — IBUPROFEN 600 MG: 600 TABLET ORAL at 11:09

## 2018-09-03 RX ADMIN — HYDROCODONE BITARTRATE AND ACETAMINOPHEN 1 TABLET: 5; 325 TABLET ORAL at 11:09

## 2018-09-03 RX ADMIN — IBUPROFEN 600 MG: 600 TABLET ORAL at 05:09

## 2018-09-03 RX ADMIN — DOCUSATE SODIUM 200 MG: 100 CAPSULE, LIQUID FILLED ORAL at 11:09

## 2018-09-03 RX ADMIN — HYDROCODONE BITARTRATE AND ACETAMINOPHEN 1 TABLET: 5; 325 TABLET ORAL at 05:09

## 2018-09-03 RX ADMIN — IBUPROFEN 600 MG: 600 TABLET ORAL at 06:09

## 2018-09-03 NOTE — PROGRESS NOTES
[facility]  Obstetrics  Postpartum Progress Note    Patient Name: Ratna Hanson  MRN: [id number]  Admission Date: 2018  Hospital Length of Stay: 2 days  Attending Physician: Gabriela Beverly DO  Primary Care Provider: Primary Doctor No    Subjective:     Principal Problem:Normal spontaneous vaginal delivery    Hospital course: 2018: Pt admitted d/t SROM and GBS + for pcn prophylaxis. She is in latent labor but andry q2-5 minutes apart and prefers expectant mgmt at this time.   2018 @ 1720: BP - HTN with elevated PC ratio - pre-eclampsia diagnosis / collaborating with MD for mgmt. Pitocin augmentation initiated at this time.   2018 @ 0035:      Interval History:   Doing well, ambulating, voiding, and tolerating regular diet  Denies headache, no visual disturbances or right upper quadrant abdominal pain, no GI s/s.  Lochia: steadily decreasing  Pain: well controlled occasionally requiring PO pain medication  Breasts/nipples: breast feeding adequately with minimal difficulty; has seen lactation. Supplementing with formula, secondary to elevated bilirubin levels in infant.  Depression/anxiety: Neg - reports fatigue today, but reports overall feeling well  Support at home: Good  Contraception: considering OCPs; understands that progesterone only options are appropriate with breastfeeding  [place]: Baby girl experiencing high bilirubin levels and being supplemented with formula. Repeat bilirubin level pending at this time.           Objective:     Vital Signs (Most Recent):  Temp: 98.1 °F (36.7 °C) (18 1200)  Pulse: 84 (18 1200)  Resp: 18 (18 1200)  BP: 115/74 (18 1200)  SpO2: 99 % (18 1200) Vital Signs (24h Range):  Temp:  [97.9 °F (36.6 °C)-98.3 °F (36.8 °C)] 98.1 °F (36.7 °C)  Pulse:  [77-84] 84  Resp:  [18] 18  SpO2:  [97 %-99 %] 99 %  BP: (115-137)/(70-77) 115/74     Weight: 64.4 kg (142 lb)  Body mass index is 26.83 kg/m².    No intake or  output data in the 24 hours ending 18 1410    Significant Labs:  Lab Results   Component Value Date    GROUPTRH A POS 2018    HEPBSAG Negative 2018    STREPBCULT  2018     STREPTOCOCCUS AGALACTIAE (GROUP B)  Beta-hemolytic streptococci are routinely susceptible to   penicillins,cephalosporins and carbapenems.       Recent Labs   Lab  18   1420   HGB  10.8*   HCT  33.3*       I have personallly reviewed all pertinent lab results from the last 24 hours.    Physical Exam:   Constitutional: She appears well-developed.                                Gen: A&O x 4, NAD  CV: normal HR  Lungs: normal resp effort  Breasts: bilaterally soft, non-tender, nipples intact bilaterally  Abdomen: soft, non-tender, uterus firm at U - 3 fb  Perineum: approximated, no edema   Lochia: minimal rubra  Ext: bilaterally without pedal edema without signs of DVT      Assessment/Plan:     27 y.o. female  for:    * Normal spontaneous vaginal delivery    Postpartum Day #1 - normal involution. Pain decreased with decreased breast feeding. Continue routine postpartum advances        Pre-eclampsia affecting pregnancy, antepartum    B/P stable today - will continue to closely monitor.        Group B Streptococcus carrier, antepartum    PCN prophylaxis received intrapartum        History of postpartum depression, currently pregnant    Reviewed warning s/s postpartum depression. Continue close monitoring.            Disposition: As patient meets milestones, will plan to discharge tomorrow.    Snow Jonas CNM  Obstetrics  [facility]

## 2018-09-03 NOTE — ASSESSMENT & PLAN NOTE
Postpartum Day #1 - normal involution. Pain decreased with decreased breast feeding. Continue routine postpartum advances

## 2018-09-03 NOTE — LACTATION NOTE
09/02/18 1700   Maternal Infant Assessment   Breast Shape round   Breast Density soft   Areola elastic   Nipple(s) everted;graspable   Infant Assessment   Sucking Reflex present   Rooting Reflex present   Swallow Reflex present   LATCH Score   Latch 2-->grasps breast, tongue down, lips flanged, rhythmic sucking   Audible Swallowing 1-->a few with stimulation   Type Of Nipple 2-->everted (after stimulation)   Comfort (Breast/Nipple) 2-->soft/nontender   Hold (Positioning) 0-->full assist (staff holds infant at breast)   Score (less than 7 for 2/more consecutive times, consult Lactation Consultant) 7   Maternal Infant Feeding   Infant Positioning cross-cradle   Signs of Milk Transfer audible swallow;breasts soften with feeding;infant jaw motion present   Time Spent (min) 15-30 min   Latch Assistance yes   Feeding Infant   Effective Latch During Feeding yes   Lactation Referrals   Lactation Consult Breastfeeding assessment;Knowledge deficit;Initial assessment   Lactation Interventions   Attachment Promotion breastfeeding assistance provided;face-to-face positioning promoted;skin-to-skin contact encouraged;rooming-in promoted   Breastfeeding Assistance assisted with positioning;feeding session observed;infant latch-on verified;infant suck/swallow verified;support offered   Maternal Breastfeeding Support lactation counseling provided   Latch Promotion positioning assisted   LC called to room to help mother get baby on the right side. Mother shown how to pull and tug nipple to make it more everted. Once mother did that the baby got on with good pulls and tugs. Swallows heard.

## 2018-09-03 NOTE — LACTATION NOTE
"This note was copied from a baby's chart.  Lactation note:  To room to assist with breastfeeding. Mother states she is now "formula feeding." She declined lactation assistance. Reviewed management of engorgement. She is aware of the risks of formula feeding. Left  phone number on board if mother decides she would like to try to breastfeed later.  "

## 2018-09-03 NOTE — SUBJECTIVE & OBJECTIVE
Hospital course: 2018: Pt admitted d/t SROM and GBS + for pcn prophylaxis. She is in latent labor but andry q2-5 minutes apart and prefers expectant mgmt at this time.   2018 @ 1720: BP - HTN with elevated PC ratio - pre-eclampsia diagnosis / collaborating with MD for mgmt. Pitocin augmentation initiated at this time.   2018 @ 0035:      Interval History:   Doing well, ambulating, voiding, and tolerating regular diet  Denies headache, no visual disturbances or right upper quadrant abdominal pain, no GI s/s.  Lochia: steadily decreasing  Pain: well controlled occasionally requiring PO pain medication  Breasts/nipples: breast feeding adequately with minimal difficulty; has seen lactation. Supplementing with formula, secondary to elevated bilirubin levels in infant.  Depression/anxiety: Neg - reports fatigue today, but reports overall feeling well  Support at home: Good  Contraception: considering OCPs; understands that progesterone only options are appropriate with breastfeeding  : Baby girl experiencing high bilirubin levels and being supplemented with formula. Repeat bilirubin level pending at this time.           Objective:     Vital Signs (Most Recent):  Temp: 98.1 °F (36.7 °C) (18 1200)  Pulse: 84 (18 1200)  Resp: 18 (18 1200)  BP: 115/74 (18 1200)  SpO2: 99 % (18 1200) Vital Signs (24h Range):  Temp:  [97.9 °F (36.6 °C)-98.3 °F (36.8 °C)] 98.1 °F (36.7 °C)  Pulse:  [77-84] 84  Resp:  [18] 18  SpO2:  [97 %-99 %] 99 %  BP: (115-137)/(70-77) 115/74     Weight: 64.4 kg (142 lb)  Body mass index is 26.83 kg/m².    No intake or output data in the 24 hours ending 18 1410    Significant Labs:  Lab Results   Component Value Date    GROUPTRH A POS 2018    HEPBSAG Negative 2018    STREPBCULT  2018     STREPTOCOCCUS AGALACTIAE (GROUP B)  Beta-hemolytic streptococci are routinely susceptible to   penicillins,cephalosporins and carbapenems.        Recent Labs   Lab  09/01/18   1420   HGB  10.8*   HCT  33.3*       I have personallly reviewed all pertinent lab results from the last 24 hours.    Physical Exam:   Constitutional: She appears well-developed.                                Gen: A&O x 4, NAD  CV: normal HR  Lungs: normal resp effort  Breasts: bilaterally soft, non-tender, nipples intact bilaterally  Abdomen: soft, non-tender, uterus firm at U - 3 fb  Perineum: approximated, no edema   Lochia: minimal rubra  Ext: bilaterally without pedal edema without signs of DVT

## 2018-09-04 VITALS
SYSTOLIC BLOOD PRESSURE: 139 MMHG | HEIGHT: 61 IN | HEART RATE: 67 BPM | BODY MASS INDEX: 26.81 KG/M2 | TEMPERATURE: 98 F | RESPIRATION RATE: 18 BRPM | OXYGEN SATURATION: 97 % | WEIGHT: 142 LBS | DIASTOLIC BLOOD PRESSURE: 70 MMHG

## 2018-09-04 PROBLEM — O99.820 GROUP B STREPTOCOCCUS CARRIER, ANTEPARTUM: Status: RESOLVED | Noted: 2018-08-09 | Resolved: 2018-09-04

## 2018-09-04 PROCEDURE — 25000003 PHARM REV CODE 250: Performed by: ADVANCED PRACTICE MIDWIFE

## 2018-09-04 RX ADMIN — IBUPROFEN 600 MG: 600 TABLET ORAL at 06:09

## 2018-09-04 NOTE — DISCHARGE SUMMARY
"[facility]  Obstetrics  Discharge Summary      Patient Name: Ratna Hanson  MRN: [id number]  Admission Date: 2018  Hospital Length of Stay: 3 days  Discharge Date and Time:  2018 11:04 AM  Attending Physician: Gabriela Beverly DO   Discharging Provider: Amanda Bonds CNM  Primary Care Provider: Primary Doctor No    HPI: Pt presents to OB ED for SROM @ 0515 this morning. She states she was able to sleep following rupture but woke up at 0830 with contractions that are "strong and 4-8 minutes apart". She reports a large gush of clear fluid at 0515 and has been continuously leaking clear fluid since that time with occasional "gushes". She reports pink tinged mucus discharge also. She denies headache, visual changes or RUQ pain. She is GBS +, so encouraged her to come in for evaluation and antibiotics. Offered augmentation but pt prefers expectant mgmt at this time.         * No surgery found *     Hospital Course:   2018: Pt admitted d/t SROM and GBS + for pcn prophylaxis. She is in latent labor but andry q2-5 minutes apart and prefers expectant mgmt at this time.   2018 @ 1720: BP - HTN with elevated PC ratio - pre-eclampsia diagnosis / collaborating with MD for mgmt. Pitocin augmentation initiated at this time.   2018 @ 0035:    2018: Normal PP course - d/c home.        CC: Post-partum Discharge note, S/P NSVB    Subjective: Ratna Hanson is a 27 y.o. female  s/p a .  Eating, urinating and defecating.   Reports scant lochia, no complaints.  Breast Feeding: Pt is formula feeding d/t increased bilirubin but is also latching baby occasionally. She is undecided about nursing at this time.  Depression: NO  : Doing well, will F/U with pediatrician.  Desires FRANCESCA for contraception.         Objective: /70   Pulse 67   Temp 97.8 °F (36.6 °C) (Oral)   Resp 18   Ht 5' 1" (1.549 m)   Wt 64.4 kg (142 lb)   LMP 2017 (Exact Date)   SpO2 97%   " Breastfeeding? Yes   BMI 26.83 kg/m²   GENERAL: No fever, chills, fatigability or weight loss.  BREASTS:soft/NTD, nipples intact  ABDOMEN: No abdominal pain. Denies nausea. Denies vomiting. No diarrhea. No constipation. Uterus firm,  2 below umbilicus.  PERINEUM: healing with good reapproximation and no evidence of infection or separation  LOCHIA: moderate.      Assessment:  1. 39 weeks gestation of pregnancy    2. Rupture of membranes with clear amniotic fluid    3. Fetal distress in liveborn infant    4. Normal spontaneous vaginal delivery        Plan:   1. Reviewed PP recommendations and precautions. To RTO if excess bleeding,   2. Rx: N/A  3. Contraception: FRANCESCA after PP visit - reviewed waiting till at least 6 wks PP and nursing precautions   4. RTO 6 weeks - PPV with CNMs.  5. Discharge pt home with baby.    Final Active Diagnoses:    Diagnosis Date Noted POA    PRINCIPAL PROBLEM:  Normal spontaneous vaginal delivery [O80] 09/02/2018 Not Applicable    Pre-eclampsia affecting pregnancy, antepartum [O14.90] 09/01/2018 No    History of postpartum depression, currently pregnant [O99.89, Z86.59] 01/23/2018 Not Applicable      Problems Resolved During this Admission:    Diagnosis Date Noted Date Resolved POA    39 weeks gestation of pregnancy [Z3A.39] 09/02/2018 09/03/2018 Not Applicable    Fetal distress in liveborn infant [P84]  09/04/2018 Unknown    Rupture of membranes with clear amniotic fluid [O42.019] 09/01/2018 09/03/2018 Yes    Normal labor [O80, Z37.9] 09/01/2018 09/03/2018 Not Applicable    Group B Streptococcus carrier, antepartum [O99.820] 08/09/2018 09/04/2018 Not Applicable        Labs: All labs within the past 24 hours have been reviewed    Feeding Method: both breast and bottle    Immunizations     Date Immunization Status Dose Route/Site Given by    09/04/18 0911 MMR Deferred (Other) 0.5 mL Subcutaneous/Left deltoid Elizabeth Dangelo RN    09/04/18 0910 Tdap Deferred (Other) 0.5 mL  Intramuscular/Left deltoid Elizabeth Dangelo RN          Delivery:    Episiotomy: None   Lacerations: None   Repair suture:     Repair # of packets: 0   Blood loss (ml): 300     Birth information:  YOB: 2019   Time of birth: 12:35 AM   Sex: female   Delivery type: Vaginal, Spontaneous Delivery   Gestational Age: 39w4d    Delivery Clinician:      Other providers:       Additional  information:  Forceps:    Vacuum:    Breech:    Observed anomalies      Living?:           APGARS  One minute Five minutes Ten minutes   Skin color:         Heart rate:         Grimace:         Muscle tone:         Breathing:         Totals: 2  9        Placenta: Delivered:       appearance    Pending Diagnostic Studies:     None          Discharged Condition: good    Disposition: Home or Self Care    Follow Up:    Patient Instructions:      Diet Adult Regular     Notify your health care provider if you experience any of the following:  persistent nausea and vomiting or diarrhea     Notify your health care provider if you experience any of the following:  temperature >100.4     Notify your health care provider if you experience any of the following:  severe uncontrolled pain     Notify your health care provider if you experience any of the following:  redness, tenderness, or signs of infection (pain, swelling, redness, odor or green/yellow discharge around incision site)     Notify your health care provider if you experience any of the following:  difficulty breathing or increased cough     Notify your health care provider if you experience any of the following:  severe persistent headache     Notify your health care provider if you experience any of the following:  worsening rash     Notify your health care provider if you experience any of the following:  persistent dizziness, light-headedness, or visual disturbances     Notify your health care provider if you experience any of the following:  increased confusion or weakness      Notify your health care provider if you experience any of the following:     Activity as tolerated     Medications:  Current Discharge Medication List      CONTINUE these medications which have NOT CHANGED    Details   diphenhydrAMINE (BENADRYL) 50 MG capsule Take 50 mg by mouth every 6 (six) hours as needed for Itching.      PNV COMB NO.59/IRON/FA/DHA (PRENATAL-DHA ORAL) Take by mouth.         STOP taking these medications       doxylamine-pyridoxine, vit B6, 10-10 mg TbEC Comments:   Reason for Stopping:         ferrous sulfate 325 mg (65 mg iron) Tab tablet Comments:   Reason for Stopping:         metoclopramide HCl (REGLAN) 10 MG tablet Comments:   Reason for Stopping:         PRENATAL VIT/IRON FUM/FOLIC AC (PRENATAL-FOLIC ACID ORAL) Comments:   Reason for Stopping:         promethazine (PHENERGAN) 25 MG tablet Comments:   Reason for Stopping:               Amanda Bonds CNM  Obstetrics  [facility]

## 2018-09-05 ENCOUNTER — HOSPITAL ENCOUNTER (EMERGENCY)
Facility: OTHER | Age: 28
Discharge: HOME | End: 2018-09-05
Attending: OBSTETRICS & GYNECOLOGY
Payer: COMMERCIAL

## 2018-09-05 ENCOUNTER — TELEPHONE (OUTPATIENT)
Dept: OBSTETRICS AND GYNECOLOGY | Facility: HOSPITAL | Age: 28
End: 2018-09-05

## 2018-09-05 VITALS
HEART RATE: 74 BPM | SYSTOLIC BLOOD PRESSURE: 134 MMHG | OXYGEN SATURATION: 100 % | DIASTOLIC BLOOD PRESSURE: 84 MMHG | RESPIRATION RATE: 18 BRPM | TEMPERATURE: 98 F

## 2018-09-05 DIAGNOSIS — R51.9 POSTPARTUM HEADACHE: Primary | ICD-10-CM

## 2018-09-05 LAB
ALBUMIN SERPL BCP-MCNC: 3 G/DL
ALP SERPL-CCNC: 112 U/L
ALT SERPL W/O P-5'-P-CCNC: 25 U/L
ANION GAP SERPL CALC-SCNC: 11 MMOL/L
AST SERPL-CCNC: 27 U/L
BASOPHILS # BLD AUTO: 0.02 K/UL
BASOPHILS NFR BLD: 0.2 %
BILIRUB SERPL-MCNC: 0.4 MG/DL
BUN SERPL-MCNC: 11 MG/DL
CALCIUM SERPL-MCNC: 9.5 MG/DL
CHLORIDE SERPL-SCNC: 107 MMOL/L
CO2 SERPL-SCNC: 23 MMOL/L
CREAT SERPL-MCNC: 0.6 MG/DL
CREAT UR-MCNC: 31 MG/DL
DIFFERENTIAL METHOD: ABNORMAL
EOSINOPHIL # BLD AUTO: 0.3 K/UL
EOSINOPHIL NFR BLD: 2.8 %
ERYTHROCYTE [DISTWIDTH] IN BLOOD BY AUTOMATED COUNT: 13.3 %
EST. GFR  (AFRICAN AMERICAN): >60 ML/MIN/1.73 M^2
EST. GFR  (NON AFRICAN AMERICAN): >60 ML/MIN/1.73 M^2
GLUCOSE SERPL-MCNC: 88 MG/DL
HCT VFR BLD AUTO: 30.8 %
HGB BLD-MCNC: 10.2 G/DL
LYMPHOCYTES # BLD AUTO: 1.8 K/UL
LYMPHOCYTES NFR BLD: 16.7 %
MCH RBC QN AUTO: 29.5 PG
MCHC RBC AUTO-ENTMCNC: 33.1 G/DL
MCV RBC AUTO: 89 FL
MONOCYTES # BLD AUTO: 0.5 K/UL
MONOCYTES NFR BLD: 4.7 %
NEUTROPHILS # BLD AUTO: 8.1 K/UL
NEUTROPHILS NFR BLD: 75.1 %
PLATELET # BLD AUTO: 293 K/UL
PMV BLD AUTO: 9.6 FL
POTASSIUM SERPL-SCNC: 4.4 MMOL/L
PROT SERPL-MCNC: 6.9 G/DL
PROT UR-MCNC: <7 MG/DL
PROT/CREAT UR: NORMAL MG/G{CREAT}
RBC # BLD AUTO: 3.46 M/UL
SODIUM SERPL-SCNC: 141 MMOL/L
WBC # BLD AUTO: 10.71 K/UL

## 2018-09-05 PROCEDURE — 63600175 PHARM REV CODE 636 W HCPCS: Performed by: STUDENT IN AN ORGANIZED HEALTH CARE EDUCATION/TRAINING PROGRAM

## 2018-09-05 PROCEDURE — 99284 EMERGENCY DEPT VISIT MOD MDM: CPT | Mod: ,,, | Performed by: OBSTETRICS & GYNECOLOGY

## 2018-09-05 PROCEDURE — 85025 COMPLETE CBC W/AUTO DIFF WBC: CPT

## 2018-09-05 PROCEDURE — 84156 ASSAY OF PROTEIN URINE: CPT

## 2018-09-05 PROCEDURE — 99283 EMERGENCY DEPT VISIT LOW MDM: CPT

## 2018-09-05 PROCEDURE — 80053 COMPREHEN METABOLIC PANEL: CPT

## 2018-09-05 RX ORDER — PROCHLORPERAZINE MALEATE 5 MG
10 TABLET ORAL ONCE
Status: COMPLETED | OUTPATIENT
Start: 2018-09-05 | End: 2018-09-05

## 2018-09-05 RX ADMIN — PROCHLORPERAZINE MALEATE 10 MG: 5 TABLET, FILM COATED ORAL at 12:09

## 2018-09-05 NOTE — ED PROVIDER NOTES
"Encounter Date: 2018       History     Chief Complaint   Patient presents with    Headache     Ratna Hanson is a 27 y.o. [id number] who is PPD#3 and presents complaining of a dull, throbbing headache. She states that the headache started at 2100 yesterday and is occipital in location. Within the past few hours she feels that the pain has moved behind her eyes. She has tried taking ibuprofen without relief. Last night she had blurry vision with bright white spots. She also reports a few episodes of palpitations and chest "tightness" last night which only occurred while laying down. In addition, she reports some dull off and on RUQ pain with deep breaths. She has a history of PreE during labor with elevated pressures which did not require MgS04.  She denies edema, SOB, fever, chills, and nausea/vomiting.            Review of patient's allergies indicates:  No Known Allergies  Past Medical History:   Diagnosis Date    Breast disorder     Benign breast cysts; negative imaging    Cholestasis during pregnancy in third trimester 2018    Postpartum depression     Used seroquil x ~ 1 month    Pre-eclampsia affecting pregnancy, antepartum 2018     Past Surgical History:   Procedure Laterality Date    BREAST SURGERY      augmentation bilateral    CHOLECYSTECTOMY      WISDOM TOOTH EXTRACTION       Family History   Problem Relation Age of Onset    Heart disease Mother     Heart attacks under age 50 Mother     No Known Problems Father     Hyperlipidemia Sister     Heart disease Paternal Grandfather     No Known Problems Paternal Grandmother     No Known Problems Maternal Grandmother     Breast cancer Neg Hx     Ovarian cancer Neg Hx     Colon cancer Neg Hx      Social History     Tobacco Use    Smoking status: Never Smoker    Smokeless tobacco: Never Used   Substance Use Topics    Alcohol use: Yes     Comment: none since pregnancy    Drug use: No     Review of Systems   Constitutional: Negative " for activity change, appetite change, chills, fatigue and fever.   HENT: Negative for congestion and rhinorrhea.    Eyes: Positive for visual disturbance (bright spots and blurriness last night). Negative for photophobia.   Respiratory: Positive for chest tightness (while laying down). Negative for cough and shortness of breath.    Cardiovascular: Positive for palpitations (a few episodes last night while laying down). Negative for chest pain and leg swelling.   Gastrointestinal: Positive for abdominal pain (off and on RUQ dull pain). Negative for constipation, diarrhea, nausea and vomiting.   Genitourinary: Negative for difficulty urinating, dysuria, flank pain, hematuria, pelvic pain, urgency and vaginal bleeding.   Musculoskeletal: Negative for back pain and joint swelling.   Skin: Negative for rash and wound.   Neurological: Positive for headaches. Negative for dizziness, syncope, weakness and light-headedness.   Psychiatric/Behavioral: Negative for agitation.        History of postpartum depression.       Physical Exam     Initial Vitals [09/05/18 1107]   BP Pulse Resp Temp SpO2   138/84 82 18 98 °F (36.7 °C) 97 %      MAP       --            Physical Exam    Vitals reviewed.  Constitutional: She appears well-developed and well-nourished. She is not diaphoretic. No distress.   HENT:   Head: Normocephalic and atraumatic.   Cardiovascular: Normal rate, regular rhythm and normal heart sounds. Exam reveals no gallop and no friction rub.    No murmur heard.  Pulmonary/Chest: Breath sounds normal. No respiratory distress. She has no wheezes. She has no rhonchi. She has no rales. She exhibits no tenderness.   Abdominal: Soft. She exhibits no distension and no mass. There is no tenderness. There is no rebound and no guarding.   Musculoskeletal: She exhibits tenderness (Mild tenderness to palpation of bilateral posterior inferolateral neck). She exhibits no edema.   Neurological: She is alert and oriented to person,  place, and time.   Skin: Skin is warm and dry. No rash noted. No erythema.   Psychiatric: She has a normal mood and affect. Her behavior is normal. Judgment and thought content normal.         ED Course   Procedures  Labs Reviewed   COMPREHENSIVE METABOLIC PANEL - Abnormal; Notable for the following components:       Result Value    Albumin 3.0 (*)     All other components within normal limits   CBC W/ AUTO DIFFERENTIAL - Abnormal; Notable for the following components:    RBC 3.46 (*)     Hemoglobin 10.2 (*)     Hematocrit 30.8 (*)     Gran # (ANC) 8.1 (*)     Gran% 75.1 (*)     Lymph% 16.7 (*)     All other components within normal limits   PROTEIN / CREATININE RATIO, URINE          Imaging Results    None          Medical Decision Making:   ED Management:  - VSS, BP: (124-156)/(81-85) 134/84  - PreE labs WNL  - Compazine given in ED for headache  - Patient reassessed 1 hour after compazine, headache dissipated  - Discussed headache management with patient, will try taking tylenol for future headaches  - Due to tender/sore posterior neck and posterior headache, discussed ergonomics when cradling   - PreE precautions given, patient voiced understanding              Attending Attestation:   Physician Attestation Statement for Resident:  As the supervising MD   Physician Attestation Statement: I have personally seen and examined this patient.   I agree with the above history. -:   As the supervising MD I agree with the above PE.    As the supervising MD I agree with the above treatment, course, plan, and disposition.   -: Patient evaluated and found to be stable, agree with resident's assessment of postpartum headache with no s/s postpartum pre-eclampsia and plan to discharge to home with precautions re managing headache and s/s postpartum pre-eclampsia.  I was personally present during the critical portions of the procedure(s) performed by the resident and was immediately available in the ED to provide  services and assistance as needed during the entire procedure.  I have reviewed and agree with the residents interpretation of the following: lab data.  I have reviewed the following: old records at this facility.                     Clinical Impression:   The encounter diagnosis was Postpartum headache.      Sabrina Diaz MD  OBGYN, PGY-1               Sabrina Diaz MD  Resident  09/05/18 2241       Leeann Chahal MD  09/06/18 [id number]

## 2018-09-05 NOTE — TELEPHONE ENCOUNTER
Called pt regarding HA x 12 hrs. Took ibuprofen without relief  Also C/O slightly blurry vision and upper chest/shoulder pain.    3 days ago with pre-E during labor.Advised to go to [facility] for assessment She agrees

## 2018-09-28 ENCOUNTER — PATIENT MESSAGE (OUTPATIENT)
Dept: OBSTETRICS AND GYNECOLOGY | Facility: CLINIC | Age: 28
End: 2018-09-28

## 2018-09-28 ENCOUNTER — TELEPHONE (OUTPATIENT)
Dept: OBSTETRICS AND GYNECOLOGY | Facility: CLINIC | Age: 28
End: 2018-09-28

## 2018-09-28 NOTE — TELEPHONE ENCOUNTER
Pt states she is having constipation for the last week, and has been taking colace three times a day.  Sennakot, miralax  recommended and Fleets enema if sennakot doesn't work.  Pt encouraged to rest, hydrate and add more fiber and green leafy vegetables to diet.   Pt states she will call if does not resolve over the weekend to be seen in clinic.

## 2018-10-04 ENCOUNTER — POSTPARTUM VISIT (OUTPATIENT)
Dept: OBSTETRICS AND GYNECOLOGY | Facility: CLINIC | Age: 28
End: 2018-10-04
Payer: COMMERCIAL

## 2018-10-04 VITALS
WEIGHT: 120.69 LBS | SYSTOLIC BLOOD PRESSURE: 143 MMHG | DIASTOLIC BLOOD PRESSURE: 80 MMHG | BODY MASS INDEX: 22.78 KG/M2 | HEIGHT: 61 IN

## 2018-10-04 DIAGNOSIS — N89.8 VAGINAL DISCHARGE: ICD-10-CM

## 2018-10-04 DIAGNOSIS — Z30.011 INITIATION OF ORAL CONTRACEPTION: ICD-10-CM

## 2018-10-04 PROBLEM — O99.891 HISTORY OF POSTPARTUM DEPRESSION, CURRENTLY PREGNANT: Status: RESOLVED | Noted: 2018-01-23 | Resolved: 2018-10-04

## 2018-10-04 PROBLEM — O26.891 PELVIC PAIN IN PREGNANCY, ANTEPARTUM, FIRST TRIMESTER: Status: RESOLVED | Noted: 2018-01-23 | Resolved: 2018-10-04

## 2018-10-04 PROBLEM — Z34.90 PREGNANCY WITH ONE FETUS, ANTEPARTUM: Status: RESOLVED | Noted: 2018-03-21 | Resolved: 2018-10-04

## 2018-10-04 PROBLEM — R10.9 ABDOMINAL CRAMPING AFFECTING PREGNANCY: Status: RESOLVED | Noted: 2018-05-26 | Resolved: 2018-10-04

## 2018-10-04 PROBLEM — O21.9 NAUSEA AND VOMITING DURING PREGNANCY PRIOR TO 22 WEEKS GESTATION: Status: RESOLVED | Noted: 2018-01-23 | Resolved: 2018-10-04

## 2018-10-04 PROBLEM — Z86.59 HISTORY OF POSTPARTUM DEPRESSION, CURRENTLY PREGNANT: Status: RESOLVED | Noted: 2018-01-23 | Resolved: 2018-10-04

## 2018-10-04 PROBLEM — O26.899 ABDOMINAL CRAMPING AFFECTING PREGNANCY: Status: RESOLVED | Noted: 2018-05-26 | Resolved: 2018-10-04

## 2018-10-04 PROBLEM — O09.299 H/O DELIVERY BY VACUUM EXTRACTION, CURRENTLY PREGNANT: Status: RESOLVED | Noted: 2018-02-20 | Resolved: 2018-10-04

## 2018-10-04 PROBLEM — R10.2 PELVIC PAIN IN PREGNANCY, ANTEPARTUM, FIRST TRIMESTER: Status: RESOLVED | Noted: 2018-01-23 | Resolved: 2018-10-04

## 2018-10-04 PROCEDURE — 99999 PR PBB SHADOW E&M-EST. PATIENT-LVL III: CPT | Mod: PBBFAC,,, | Performed by: ADVANCED PRACTICE MIDWIFE

## 2018-10-04 PROCEDURE — 0503F POSTPARTUM CARE VISIT: CPT | Mod: S$GLB,,, | Performed by: ADVANCED PRACTICE MIDWIFE

## 2018-10-04 PROCEDURE — 87660 TRICHOMONAS VAGIN DIR PROBE: CPT

## 2018-10-04 RX ORDER — DROSPIRENONE AND ETHINYL ESTRADIOL 0.02-3(28)
1 KIT ORAL DAILY
Qty: 90 TABLET | Refills: 3 | Status: SHIPPED | OUTPATIENT
Start: 2018-10-04 | End: 2018-10-31

## 2018-10-04 NOTE — PROGRESS NOTES
"Ratna Hanson is a 27 y.o. female  presents for a postpartum visit.  She is status post 4 weeks ago.  Her hospitalization was not complicated.  She is not breastfeeding.  She desires oral contraceptives (estrogen/progesterone) for contraception.  She denies postpartum depression EDS score 8 down form 12. Reports feeling anxiety. Has been treated in the past and declines restarting medication today.    Her last pap was last year and was normal per pt report.   Reports large amount of itchy vaginal discharge x 2 days. Past hx of BV. Has been scratching. Also states she thinks her cervix may have come down, she saw a "pink Yuhaaviatam coming" out a few days ago.  Has not resumed sexual activity.   Has cystic breasts and is wondering if she needs an ultrasound examination.    Past Medical History:   Diagnosis Date    Breast disorder     Benign breast cysts; negative imaging    Cholestasis during pregnancy in third trimester 2018    Postpartum depression     Used seroquil x ~ 1 month    Pre-eclampsia affecting pregnancy, antepartum 2018     Past Surgical History:   Procedure Laterality Date    BREAST SURGERY      augmentation bilateral    CHOLECYSTECTOMY      WISDOM TOOTH EXTRACTION       Review of patient's allergies indicates:  No Known Allergies    Current Outpatient Medications:     drospirenone-ethinyl estradiol (MITCHELL) 3-0.02 mg per tablet, Take 1 tablet by mouth once daily., Disp: 90 tablet, Rfl: 3    PNV COMB NO.59/IRON/FA/DHA (PRENATAL-DHA ORAL), Take by mouth., Disp: , Rfl:       Vitals:    10/04/18 1057   BP: (!) 143/80       GENERAL: healthy, alert, no distress, smiling  ABDOMEN: Normal, benign. and no masses, hepatosplenomegaly, no hernias  EXTERNAL GENITALIA POSTPARTUM: normal, well-healed, without lesions or masses   VAGINA POSTPARTUM: normal, well-healed, physiologic discharge, without lesions and copious amount of thin white discharge present.   CERVIX POSTPARTUM: normal position, pink, " without lesions   UTERUS POSTPARTUM: normal size, well involuted, firm, non-tender, ADNEXA POSTPARTUM: non-tender, no masses    Assessment:    Normal 4 wk postpartum exam  BP elevated  Bottle feeding infant, infant gaining wt well.  Vaginal discharge  Anxiety  Birth control consultation.     Plan:    -Affirm test sent.  -Methods of birth control discussed. Pt desires FRANCESCA OCP. Was on Britney for 1 year int the past without difficulty. Rx Britney,sent to pharmacy. Discussed Quick start method beginning tomorrow morning. Instructed to abstain from sex x 1 week for birth control to take effect and until discharge is identified and treatment if needed.   -Discussed anxiety, suggested meditation, daily walk and deep breathing techniques to control. Encouraged pt to call office for reevaluation appointment if anxiety increases.  -Instructed pt to come for annual exam for wellness, BP check, and Pap after postpartum period concluded (approx 4 weeks).

## 2018-10-06 ENCOUNTER — PATIENT MESSAGE (OUTPATIENT)
Dept: OBSTETRICS AND GYNECOLOGY | Facility: CLINIC | Age: 28
End: 2018-10-06

## 2018-10-06 RX ORDER — METRONIDAZOLE 500 MG/1
500 TABLET ORAL 2 TIMES DAILY
Qty: 14 TABLET | Refills: 0 | Status: SHIPPED | OUTPATIENT
Start: 2018-10-06 | End: 2018-10-13

## 2018-10-31 ENCOUNTER — TELEPHONE (OUTPATIENT)
Dept: NEUROLOGY | Facility: CLINIC | Age: 28
End: 2018-10-31

## 2018-10-31 ENCOUNTER — HOSPITAL ENCOUNTER (OUTPATIENT)
Dept: RADIOLOGY | Facility: OTHER | Age: 28
Discharge: HOME | End: 2018-10-31
Attending: ADVANCED PRACTICE MIDWIFE
Payer: COMMERCIAL

## 2018-10-31 ENCOUNTER — OFFICE VISIT (OUTPATIENT)
Dept: OBSTETRICS AND GYNECOLOGY | Facility: CLINIC | Age: 28
End: 2018-10-31
Payer: COMMERCIAL

## 2018-10-31 VITALS
HEIGHT: 61 IN | SYSTOLIC BLOOD PRESSURE: 122 MMHG | DIASTOLIC BLOOD PRESSURE: 76 MMHG | BODY MASS INDEX: 21.89 KG/M2 | WEIGHT: 115.94 LBS

## 2018-10-31 DIAGNOSIS — M79.604 RIGHT LEG PAIN: ICD-10-CM

## 2018-10-31 DIAGNOSIS — M79.604 RIGHT LEG PAIN: Primary | ICD-10-CM

## 2018-10-31 DIAGNOSIS — M79.651 RIGHT THIGH PAIN: ICD-10-CM

## 2018-10-31 DIAGNOSIS — G43.109 MIGRAINE WITH AURA AND WITHOUT STATUS MIGRAINOSUS, NOT INTRACTABLE: Primary | ICD-10-CM

## 2018-10-31 DIAGNOSIS — N64.4 BREAST PAIN: ICD-10-CM

## 2018-10-31 DIAGNOSIS — Z87.2 H/O CYST OF BREAST: ICD-10-CM

## 2018-10-31 PROCEDURE — 93971 EXTREMITY STUDY: CPT | Mod: 26,,, | Performed by: RADIOLOGY

## 2018-10-31 PROCEDURE — 93971 EXTREMITY STUDY: CPT | Mod: TC

## 2018-10-31 PROCEDURE — 99215 OFFICE O/P EST HI 40 MIN: CPT | Mod: S$GLB,,, | Performed by: ADVANCED PRACTICE MIDWIFE

## 2018-10-31 PROCEDURE — 99999 PR PBB SHADOW E&M-EST. PATIENT-LVL III: CPT | Mod: PBBFAC,,, | Performed by: ADVANCED PRACTICE MIDWIFE

## 2018-10-31 PROCEDURE — 3008F BODY MASS INDEX DOCD: CPT | Mod: CPTII,S$GLB,, | Performed by: ADVANCED PRACTICE MIDWIFE

## 2018-10-31 NOTE — PROGRESS NOTES
"Ratna Hanson is a 27 y.o.  who presents today for GYN problem visit.   Brings baby with her; formula feeding and doesn't want to breastfeed nor discuss breastfeeding    C/C: several issues today:    1. Migraines especially since starting OCPs recently after delivery; + aura/photophobia/sensitivity; tylenol / ibuprofen not helping  2. R thigh pain "I'm worried about a clot"  3. Breast pain "I have a history of breast cysts and I was followed for them regularly at  but haven't had them looked at since I had the baby  4. Anxiety - hx of anxiety, used lexapro and seroquel for short-term, does not want to start meds at this time, no HI/SI, no depression at present  5. Contraception counseling; wants to discuss other options    18  of LFI 3040g, preE  18 DC home  18 PAULINA visit for HA. MS home  10/4/18 PPE    She is currently sexually active and uses OCPs for contraception. Denies dyspareunia. Declines need for STD testing.   Emotionally:   -- She reports she is anxious and worried  -- States her mood is anxious  -- Denies HI/SI  -- Performing ADLs, caring for self and baby and other child  -- Support at home: adequate  -- Work situation: at home right now  -- Partner situation: long term relationship with Gene  -- Hx of depression/anxiety: yes  -- Hx of medication use for mental health: yes, short term use of seroquel and lexapro  -- Hx of SI or attempt: no  -- Hx of hospitalization for mental health issues: no  -- Any other sx: no    MENSTRUAL HISTORY  Patient's last menstrual period was 10/15/2018.. She reports regular menses typically occurring every 28 days.  Menses last 4-5 days with moderate flow.  She denies dysmenorrhea.  She denies intermenstrual bleeding.    PAP HISTORY: last pap 2017 at  (in previous records I looked at it today) - negative, no known STDs or hx of     SOCIAL HISTORY: Denies emotional/mental/physical/sexual violence or abuse. Feels safe at home.    Review of patient's " allergies indicates:  No Known Allergies  Past Medical History:   Diagnosis Date    Breast disorder     Benign breast cysts; negative imaging    Cholestasis during pregnancy in third trimester 2018    Postpartum depression     Used seroquil x ~ 1 month    Pre-eclampsia affecting pregnancy, antepartum 2018     Past Surgical History:   Procedure Laterality Date    BREAST SURGERY      augmentation bilateral    CHOLECYSTECTOMY      WISDOM TOOTH EXTRACTION       Past Surgical History:   Procedure Laterality Date    BREAST SURGERY      augmentation bilateral    CHOLECYSTECTOMY      WISDOM TOOTH EXTRACTION       OB History    Para Term  AB Living   5 2 2   3 2   SAB TAB Ectopic Multiple Live Births   3     0 2      # Outcome Date GA Lbr Albaro/2nd Weight Sex Delivery Anes PTL Lv   5 Term 18 39w4d  3.04 kg (6 lb 11.2 oz) F Vag-Spont None N SOWMYA      Complications: Shoulder Dystocia   4 SAB 17 4w0d             Birth Comments: Faint + UPT (not confirmed with US), then started menses when expected   3 SAB 17 4w0d             Birth Comments: Faint + UPT (not confirmed with US), then started menses when expected   2 SAB 10/31/16 5w0d             Birth Comments: Several + UPTs then started menses at 1 week late then UPT negative   1 Term 06 38w0d  2.948 kg (6 lb 8 oz) M Vag-Vacuum EPI  SOWMYA      Birth Comments: VAVD for maternal exhaustion        OB History      Para Term  AB Living    5 2 2   3 2    SAB TAB Ectopic Multiple Live Births    3     0 2        Social History     Socioeconomic History    Marital status:      Spouse name: Not on file    Number of children: Not on file    Years of education: Not on file    Highest education level: Not on file   Social Needs    Financial resource strain: Not on file    Food insecurity - worry: Not on file    Food insecurity - inability: Not on file    Transportation needs - medical: Not on file     "Transportation needs - non-medical: Not on file   Occupational History    Not on file   Tobacco Use    Smoking status: Never Smoker    Smokeless tobacco: Never Used   Substance and Sexual Activity    Alcohol use: Yes     Comment: none since pregnancy    Drug use: No    Sexual activity: Yes     Birth control/protection: None     Comment: planning pregnancy   Other Topics Concern    Not on file   Social History Narrative     Gene Owen is their first child together (his first child).      She has a 12 year old, Gabo     Family History   Problem Relation Age of Onset    Heart disease Mother     Heart attacks under age 50 Mother     No Known Problems Father     Hyperlipidemia Sister     Heart disease Paternal Grandfather     No Known Problems Paternal Grandmother     No Known Problems Maternal Grandmother     Breast cancer Neg Hx     Ovarian cancer Neg Hx     Colon cancer Neg Hx      Social History     Substance and Sexual Activity   Sexual Activity Yes    Birth control/protection: None    Comment: planning pregnancy       Primary Doctor No     ROS  Gen: Negative--fever, weight change, fatigue, appetite change  Skin:  Negative--Hirsutism, acne, rash  CV:  Negative--chest pain, palpitations, syncope  Resp:  Negative--Cough, shortness of breath, wheezing  GI:  Negative--Nausea, vomiting, diarrhea, constipation, abdominal pain  :  Negative--Dysuria, vaginal discharge, genital sores, dyspareunia + vaginal bleeding/menses  Breast: + breast pain + L breast cyst, neg - no nipple discharge  Psych: + anxiety, neg - depression  MS: + R thigh pain  Neuro/head: + HAs, neg -numbness, tingling, confusion, seizures, dizziness  Endocrine:  Negative--Polydipsia, polyuria, heat or cold intolerance      OBJECTIVE:  /76   Ht 5' 1" (1.549 m)   Wt 52.6 kg (115 lb 15.4 oz)   LMP 10/15/2018   Breastfeeding? No   BMI 21.91 kg/m²    Appearance: well-groomed, in good hygiene  Behavior: maintains eye " "contact during discussion, no abnormal gestures or mannerisms  Attitude: cooperative, open, communicative  Consciousness: alert   Orientation: A&O x 4   Speech and language: normal rate, normal tone, with clear articulation  Affect: appropriate to situation, consistent with mood and congruent with thought content  Thought process: linear   Memory: appears intact without deficit  Constitutional/Gen: Constitutional/Gen: NAD, appears stated age, well groomed  -- Brings infant with her today who appears well cared for, well nourished; I observed Ratna interact with her lovingly and hold her in a caring way  Lung: normal resp effort, CTAB  Heart: normal HR, RRR   Breasts: R breast : no masses, tenderness elicited with exam, no nipple discharge, nipple intact  L breast : approx 3cm from areola at 12 o'clock position palpated tender lump/mass approx 1cm x 1 cm, nipple intact without discharge, generalized tenderness with breast exam  External genitalia: no lesions or discharge, normal hair distribution  Urethral meatus: normal size and location, no lesions or prolapse  Vagina: normal appearance, no lesions, no abn discharge, no evidence cystocele or rectocele.  Cervix: scant blood in vault, cervix has normal appearance, no lesions, negative CMT  Uterus: nontender, mobile, normal size, contour, and position. No pathologic descent.  Adnexa: no masses or tenderness  Anus/Perineum: normal appearance, with no lesions or discharge. Internal exam deferred.  Neurologic: A&O x 4, non-focal, cranial nerves 2-12 grossly intact    A/P:  27 y.o. female  for GYN problem visit    Migraines with aura  -- Normotensive today (hx preE)  -- Advised to stop OCPs immediately  -- Referral to neurology asap  -- Comfort measures reviewed    R thigh pain "I'm worried about a clot"  -- Ultrasound/doppler today    Breast pain "I have a history of breast cysts and I was followed for them regularly at  but haven't had them looked at since I " had the baby  -- Imaging ordered  -- Referral to [facility]    Anxiety - hx of anxiety, used lexapro and seroquel for short-term, does not want to start meds at this time, no HI/SI, no depression at present  -- Denies HI/SI, performing ADLs, caring for self and family  -- Discussed options for plan of care including establishing care with mental health provider (discussed our working relationship with psychiatric provider Racquel Padgett at [facility]), establishing care with [facility] ([phone] office in [place]), and/or starting SSRI now.   -- Discussed support at home.   -- Discussed social support and mom's group. Reviewed community support including [employer]ugg[employer] Strugg[employer], Mommy [employer] Me Yoga, BYOB at [facility] [employer], [employer], [employer], [employer], and story times.   -- Reviewed warning signs including development of HI/SI or worsening PPD with anxiety progressing to psychosis.   -- After a discussion of options, Ratna elects for lifestyle changes/meditation.  Also encouraged counseling/therapy. Declines medication  -- Educated about adjunct treatments including but not limited to avoidance of stimulants including caffeine, participation in regular exercise, yoga, meditation, and breathing exercises and daily exposure to sun/light therapy. Also discussed setting aside at least 1hr/week for which she has to herself (without childcare or other responsibilities) for which she can participate in self-care.   --She promises call or present to ED immediately with any worsening of sx or development of HI/SI.     Contraception counseling; wants to discuss other options  -- Discussed avoiding estrogen contraceptives 2/2 migraines with aura  -- Discussed contraceptive options, including: IUDs, Depo Provera, Nexplanon, diaphragms, condom use, and abstinence. R/Bs and SEs of each were discussed at length. Counseled that hormonal contraceptive methods do not prevent  STDs. Counseled on side effects of hormonal methods including but not limited to headache, GI upset, breast tenderness, weight gain (average of 3-5 lbs). Counseled on slight increased risk of DVT/Stroke, which is more common in smokers over 35 years of age.   -- Declines contraception from me at this time; will plan to use condoms which they have used in the past with satisfaction    Continue annual exams, return then or sooner prn    Updated Medication List:  Current Outpatient Medications   Medication Sig Dispense Refill    multivitamin capsule Take 1 capsule by mouth once daily.      PNV COMB NO.59/IRON/FA/DHA (PRENATAL-DHA ORAL) Take by mouth.      predniSONE (DELTASONE) 20 MG tablet Take 3 tabs in AM x 2 days then 2 tabs in AM x 2 days, then 1 tab in AM x 2 days.  Take with food. 12 tablet 0    rizatriptan (MAXALT-MLT) 5 MG disintegrating tablet Dissolve 1 tab on tongue at onset of migraine, may repeat dose in 2 hours if needed. Max 3 tabs/day. Max 3 days/week. 12 tablet 5     No current facility-administered medications for this visit.         Rocío Farias CNM/NP  11/1/2018 11:53 AM

## 2018-10-31 NOTE — TELEPHONE ENCOUNTER
----- Message from Antonio Britton sent at 10/31/2018  9:54 AM CDT -----            Name of Who is Calling: Jael ([facility])      What is the request in detail: Pt is being referred by Rocío Hoffman for Migraine headaches. First available appt isn't until 2/14, but pt needs to be seen sooner. Please call pt to discuss.      Can the clinic reply by [facility]: no      What Number to Call Back if not in [facility]: [phone]

## 2018-10-31 NOTE — TELEPHONE ENCOUNTER
Left vmail asking for call back. If she is okay seeing another provider who specializes in migraine, we can schedule her sooner

## 2018-11-01 ENCOUNTER — OFFICE VISIT (OUTPATIENT)
Dept: NEUROLOGY | Facility: CLINIC | Age: 28
End: 2018-11-01
Payer: COMMERCIAL

## 2018-11-01 VITALS
WEIGHT: 114 LBS | BODY MASS INDEX: 21.52 KG/M2 | HEIGHT: 61 IN | HEART RATE: 72 BPM | DIASTOLIC BLOOD PRESSURE: 77 MMHG | SYSTOLIC BLOOD PRESSURE: 112 MMHG

## 2018-11-01 DIAGNOSIS — G43.109 MIGRAINE WITH AURA AND WITHOUT STATUS MIGRAINOSUS, NOT INTRACTABLE: Primary | ICD-10-CM

## 2018-11-01 DIAGNOSIS — R51.9 CHRONIC INTRACTABLE HEADACHE, UNSPECIFIED HEADACHE TYPE: ICD-10-CM

## 2018-11-01 DIAGNOSIS — G89.29 CHRONIC INTRACTABLE HEADACHE, UNSPECIFIED HEADACHE TYPE: ICD-10-CM

## 2018-11-01 PROCEDURE — 99204 OFFICE O/P NEW MOD 45 MIN: CPT | Mod: S$GLB,,, | Performed by: NURSE PRACTITIONER

## 2018-11-01 PROCEDURE — 99999 PR PBB SHADOW E&M-EST. PATIENT-LVL III: CPT | Mod: PBBFAC,,, | Performed by: NURSE PRACTITIONER

## 2018-11-01 PROCEDURE — 3008F BODY MASS INDEX DOCD: CPT | Mod: CPTII,S$GLB,, | Performed by: NURSE PRACTITIONER

## 2018-11-01 RX ORDER — RIZATRIPTAN BENZOATE 5 MG/1
TABLET, ORALLY DISINTEGRATING ORAL
Qty: 12 TABLET | Refills: 5 | Status: SHIPPED | OUTPATIENT
Start: 2018-11-01 | End: 2018-11-20

## 2018-11-01 RX ORDER — PREDNISONE 20 MG/1
TABLET ORAL
Qty: 12 TABLET | Refills: 0 | Status: SHIPPED | OUTPATIENT
Start: 2018-11-01 | End: 2018-11-20 | Stop reason: ALTCHOICE

## 2018-11-01 NOTE — PATIENT INSTRUCTIONS
Supplements for Migraine:  Magnesium Oxide 400 mg daily   Vitamin B2 (Riboflavin) - 400 mg daily   Co-Q10 200 mg daily     - Please download Migraine Minh alonzo on phone and begin tracking your headaches

## 2018-11-01 NOTE — PROGRESS NOTES
"SUBJECTIVE:  Patient ID: Ratna Hanson   MRN: [id number]  Referred By: Rocío Hoffman  Chief Complaint: Consult    History of Present Illness:   27 y.o. female with no significant medical history, who presents to clinic alone for evaluation of headaches.   Headaches initially began during her third trimester and have persisted, intensifying over the last two weeks (now 6 weeks post-partum).  Headaches are located behind the left eye and radiating into the occipitalis, can last minutes to hours.  Pain can come on at anytime of the day.  Headaches can be triggered by movement or getting up too quickly.  Pain is described as a severe, stabbing/throbbing pain.  Associated symptoms include photophobia, phonophobia, nausea.  She does describe seeing white floating lights in her vision only in the left eye.  She denies family history of migraine.  She did not have these type of headaches during pregnancy.  She is not breast feeding.  Prior to pregnancy, she "very rarely" would get headaches. She is not breast feeding.  She does admit her sleep is poor as her  wakes up frequently throughout the night.  Also reports her neck has been bothering her and feels more tight than usual, which she attributes to looking down while holding her .     Treatments Tried and Response  Prednisone - given today   Maxalt - given today     Current Medications:    multivitamin capsule, Take 1 capsule by mouth once daily., Disp: , Rfl:     PNV COMB NO.59/IRON/FA/DHA (PRENATAL-DHA ORAL), Take by mouth., Disp: , Rfl:     predniSONE (DELTASONE) 20 MG tablet, Take 3 tabs in AM x 2 days then 2 tabs in AM x 2 days, then 1 tab in AM x 2 days.  Take with food., Disp: 12 tablet, Rfl: 0    rizatriptan (MAXALT-MLT) 5 MG disintegrating tablet, Dissolve 1 tab on tongue at onset of migraine, may repeat dose in 2 hours if needed. Max 3 tabs/day. Max 3 days/week., Disp: 12 tablet, Rfl: 5    Review of Systems - as per HPI, otherwise a balanced 10 " "systems review is negative.    OBJECTIVE:  Vitals:  /77   Pulse 72   Ht 5' 1" (1.549 m)   Wt 51.7 kg (114 lb)   LMP 10/15/2018   BMI 21.54 kg/m²     Physical Exam   Constitutional: She appears well-developed and well-nourished. She is well groomed. NAD  HENT:    Head: Normocephalic and atraumatic, oral and nasal mucosa intact.  Frontalis was NTTP, temporalis was NTTP   Eyes: Conjunctivae and EOM are normal. Pupils are equal, round, and reactive to light   Neck: Neck supple. Occiput and trapezius TTP on left, NTTP on right    Musculoskeletal: Normal range of motion. No joint stiffness. No vertebral point tenderness.  Skin: Skin is warm and dry.  Psychiatric: Normal mood and affect.     Neuro exam:    Mental status:  The patient is alert and oriented to person, place and time.  Language is intact and fluent  Remote and recent memory are intact  Normal attention and concentration  Mood is stable    Cranial Nerves:  Fundoscopic examination does not reveal any occult papilledema.    Pupils are equal and reactive to light.    Extraocular movements are intact and without nystagmus.    Visual fields are full to confrontation testing.   Facial movement is symmetric.  Facial sensation is intact.    Hearing is intact to finger rub   Uvula in midline. Tongue in midline without fasciculation.   FROM of neck in all (6) directions.  Shoulder shrug symmetrical.    Motor:  Normal muscle bulk and symmetry. No fasciculations were noted.   Tremor not apparent   Pronator drift not apparent.    strength was strong and symmetric   Finger extension strength was strong and symmetric   RUE:appropriate against gravity and medium force as tested 5/5  LUE: appropriate against gravity and medium force as tested 5/5  RLE:appropriate against gravity and medium force as tested 5/5              LLE: appropriate against gravity and medium force as tested 5/5    Reflexes:  Right Brachioradialis 2+  Left Brachioradialis 2+  Right Biceps " 2+  Left Biceps 2+  Right Patellar2+  Left Patellar 2+                          Plantar flexion bilat   Christopher was negative bilaterally      Sensory:  RUE  intact light touch  LUE intact light touch    RLE intact light touch  LLE intact light touch    Gait:   Romberg - negative   Normal gait  Tandem, Heel, and Toe Walk - able to perform without difficulty     Review of Data:   Notes from OBGYN reviewed   Labs:  Postpartum Visit on 10/04/2018   Component Date Value Ref Range Status    Trichomonas vaginalis 10/04/2018 Negative  Negative Final    Gardnerella vaginalis 10/04/2018 Positive* Negative Final    Candida sp 10/04/2018 Negative  Negative Final   Admission on 09/05/2018, Discharged on 09/05/2018   Component Date Value Ref Range Status    Sodium 09/05/2018 141  136 - 145 mmol/L Final    Potassium 09/05/2018 4.4  3.5 - 5.1 mmol/L Final    Chloride 09/05/2018 107  95 - 110 mmol/L Final    CO2 09/05/2018 23  23 - 29 mmol/L Final    Glucose 09/05/2018 88  70 - 110 mg/dL Final    BUN, Bld 09/05/2018 11  6 - 20 mg/dL Final    Creatinine 09/05/2018 0.6  0.5 - 1.4 mg/dL Final    Calcium 09/05/2018 9.5  8.7 - 10.5 mg/dL Final    Total Protein 09/05/2018 6.9  6.0 - 8.4 g/dL Final    Albumin 09/05/2018 3.0* 3.5 - 5.2 g/dL Final    Total Bilirubin 09/05/2018 0.4  0.1 - 1.0 mg/dL Final    Alkaline Phosphatase 09/05/2018 112  55 - 135 U/L Final    AST 09/05/2018 27  10 - 40 U/L Final    ALT 09/05/2018 25  10 - 44 U/L Final    Anion Gap 09/05/2018 11  8 - 16 mmol/L Final    eGFR if African American 09/05/2018 >60  >60 mL/min/1.73 m^2 Final    eGFR if non African American 09/05/2018 >60  >60 mL/min/1.73 m^2 Final    WBC 09/05/2018 10.71  3.90 - 12.70 K/uL Final    RBC 09/05/2018 3.46* 4.00 - 5.40 M/uL Final    Hemoglobin 09/05/2018 10.2* 12.0 - 16.0 g/dL Final    Hematocrit 09/05/2018 30.8* 37.0 - 48.5 % Final    MCV 09/05/2018 89  82 - 98 fL Final    MCH 09/05/2018 29.5  27.0 - 31.0 pg Final    MCHC  09/05/2018 33.1  32.0 - 36.0 g/dL Final    RDW 09/05/2018 13.3  11.5 - 14.5 % Final    Platelets 09/05/2018 293  150 - 350 K/uL Final    MPV 09/05/2018 9.6  9.2 - 12.9 fL Final    Gran # (ANC) 09/05/2018 8.1* 1.8 - 7.7 K/uL Final    Lymph # 09/05/2018 1.8  1.0 - 4.8 K/uL Final    Mono # 09/05/2018 0.5  0.3 - 1.0 K/uL Final    Eos # 09/05/2018 0.3  0.0 - 0.5 K/uL Final    Baso # 09/05/2018 0.02  0.00 - 0.20 K/uL Final    Gran% 09/05/2018 75.1* 38.0 - 73.0 % Final    Lymph% 09/05/2018 16.7* 18.0 - 48.0 % Final    Mono% 09/05/2018 4.7  4.0 - 15.0 % Final    Eosinophil% 09/05/2018 2.8  0.0 - 8.0 % Final    Basophil% 09/05/2018 0.2  0.0 - 1.9 % Final    Differential Method 09/05/2018 Automated   Final    Protein, Urine Random 09/05/2018 <7  0 - 15 mg/dL Final    Creatinine, Random Ur 09/05/2018 31.0  15.0 - 325.0 mg/dL Final    Prot/Creat Ratio, Ur 09/05/2018 Unable to calculate  0.00 - 0.20 Final   Admission on 09/01/2018, Discharged on 09/04/2018   Component Date Value Ref Range Status    Group & Rh 09/01/2018 A POS   Final    Indirect Tosin 09/01/2018 NEG   Final    WBC 09/01/2018 11.25  3.90 - 12.70 K/uL Final    RBC 09/01/2018 3.72* 4.00 - 5.40 M/uL Final    Hemoglobin 09/01/2018 10.8* 12.0 - 16.0 g/dL Final    Hematocrit 09/01/2018 33.3* 37.0 - 48.5 % Final    MCV 09/01/2018 90  82 - 98 fL Final    MCH 09/01/2018 29.0  27.0 - 31.0 pg Final    MCHC 09/01/2018 32.4  32.0 - 36.0 g/dL Final    RDW 09/01/2018 13.1  11.5 - 14.5 % Final    Platelets 09/01/2018 242  150 - 350 K/uL Final    MPV 09/01/2018 11.3  9.2 - 12.9 fL Final    Gran # (ANC) 09/01/2018 8.9* 1.8 - 7.7 K/uL Final    Lymph # 09/01/2018 1.4  1.0 - 4.8 K/uL Final    Mono # 09/01/2018 0.8  0.3 - 1.0 K/uL Final    Eos # 09/01/2018 0.0  0.0 - 0.5 K/uL Final    Baso # 09/01/2018 0.02  0.00 - 0.20 K/uL Final    Gran% 09/01/2018 78.9* 38.0 - 73.0 % Final    Lymph% 09/01/2018 12.7* 18.0 - 48.0 % Final    Mono% 09/01/2018 7.4   4.0 - 15.0 % Final    Eosinophil% 09/01/2018 0.1  0.0 - 8.0 % Final    Basophil% 09/01/2018 0.2  0.0 - 1.9 % Final    Differential Method 09/01/2018 Automated   Final    Protein, Urine Random 09/01/2018 16* 0 - 15 mg/dL Final    Creatinine, Random Ur 09/01/2018 10.9* 15.0 - 325.0 mg/dL Final    Prot/Creat Ratio, Ur 09/01/2018 1.47* 0.00 - 0.20 Final    Sodium 09/01/2018 136  136 - 145 mmol/L Final    Potassium 09/01/2018 4.6  3.5 - 5.1 mmol/L Final    Chloride 09/01/2018 102  95 - 110 mmol/L Final    CO2 09/01/2018 19* 23 - 29 mmol/L Final    Glucose 09/01/2018 92  70 - 110 mg/dL Final    BUN, Bld 09/01/2018 5* 6 - 20 mg/dL Final    Creatinine 09/01/2018 0.7  0.5 - 1.4 mg/dL Final    Calcium 09/01/2018 10.6* 8.7 - 10.5 mg/dL Final    Total Protein 09/01/2018 7.5  6.0 - 8.4 g/dL Final    Albumin 09/01/2018 3.3* 3.5 - 5.2 g/dL Final    Total Bilirubin 09/01/2018 0.6  0.1 - 1.0 mg/dL Final    Alkaline Phosphatase 09/01/2018 147* 55 - 135 U/L Final    AST 09/01/2018 17  10 - 40 U/L Final    ALT 09/01/2018 12  10 - 44 U/L Final    Anion Gap 09/01/2018 15  8 - 16 mmol/L Final    eGFR if African American 09/01/2018 >60  >60 mL/min/1.73 m^2 Final    eGFR if non African American 09/01/2018 >60  >60 mL/min/1.73 m^2 Final    Protein, Urine Random 09/01/2018 9  0 - 15 mg/dL Final    Creatinine, Random Ur 09/01/2018 18.2  15.0 - 325.0 mg/dL Final    Prot/Creat Ratio, Ur 09/01/2018 0.49* 0.00 - 0.20 Final   Admission on 08/22/2018, Discharged on 08/22/2018   Component Date Value Ref Range Status    WBC 08/22/2018 10.14  3.90 - 12.70 K/uL Final    RBC 08/22/2018 3.83* 4.00 - 5.40 M/uL Final    Hemoglobin 08/22/2018 11.3* 12.0 - 16.0 g/dL Final    Hematocrit 08/22/2018 34.2* 37.0 - 48.5 % Final    MCV 08/22/2018 89  82 - 98 fL Final    MCH 08/22/2018 29.5  27.0 - 31.0 pg Final    MCHC 08/22/2018 33.0  32.0 - 36.0 g/dL Final    RDW 08/22/2018 12.6  11.5 - 14.5 % Final    Platelets 08/22/2018 226   150 - 350 K/uL Final    MPV 08/22/2018 10.9  9.2 - 12.9 fL Final    Gran # (ANC) 08/22/2018 7.8* 1.8 - 7.7 K/uL Final    Lymph # 08/22/2018 1.5  1.0 - 4.8 K/uL Final    Mono # 08/22/2018 0.7  0.3 - 1.0 K/uL Final    Eos # 08/22/2018 0.0  0.0 - 0.5 K/uL Final    Baso # 08/22/2018 0.02  0.00 - 0.20 K/uL Final    Gran% 08/22/2018 76.6* 38.0 - 73.0 % Final    Lymph% 08/22/2018 15.1* 18.0 - 48.0 % Final    Mono% 08/22/2018 7.2  4.0 - 15.0 % Final    Eosinophil% 08/22/2018 0.2  0.0 - 8.0 % Final    Basophil% 08/22/2018 0.2  0.0 - 1.9 % Final    Differential Method 08/22/2018 Automated   Final    Group & Rh 08/22/2018 A POS   Final    Indirect Tosin 08/22/2018 NEG   Final   Lab Visit on 08/20/2018   Component Date Value Ref Range Status    Bile Acids Total 08/20/2018 4  <=10 mcmol/L Final    Sodium 08/20/2018 135* 136 - 145 mmol/L Final    Potassium 08/20/2018 4.1  3.5 - 5.1 mmol/L Final    Chloride 08/20/2018 105  95 - 110 mmol/L Final    CO2 08/20/2018 20* 23 - 29 mmol/L Final    Glucose 08/20/2018 115* 70 - 110 mg/dL Final    BUN, Bld 08/20/2018 8  6 - 20 mg/dL Final    Creatinine 08/20/2018 0.7  0.5 - 1.4 mg/dL Final    Calcium 08/20/2018 9.6  8.7 - 10.5 mg/dL Final    Total Protein 08/20/2018 6.9  6.0 - 8.4 g/dL Final    Albumin 08/20/2018 3.0* 3.5 - 5.2 g/dL Final    Total Bilirubin 08/20/2018 0.6  0.1 - 1.0 mg/dL Final    Alkaline Phosphatase 08/20/2018 124  55 - 135 U/L Final    AST 08/20/2018 17  10 - 40 U/L Final    ALT 08/20/2018 11  10 - 44 U/L Final    Anion Gap 08/20/2018 10  8 - 16 mmol/L Final    eGFR if African American 08/20/2018 >60  >60 mL/min/1.73 m^2 Final    eGFR if non African American 08/20/2018 >60  >60 mL/min/1.73 m^2 Final    WBC 08/20/2018 9.23  3.90 - 12.70 K/uL Final    RBC 08/20/2018 3.55* 4.00 - 5.40 M/uL Final    Hemoglobin 08/20/2018 10.5* 12.0 - 16.0 g/dL Final    Hematocrit 08/20/2018 31.7* 37.0 - 48.5 % Final    MCV 08/20/2018 89  82 - 98 fL Final     MCH 08/20/2018 29.6  27.0 - 31.0 pg Final    MCHC 08/20/2018 33.1  32.0 - 36.0 g/dL Final    RDW 08/20/2018 12.6  11.5 - 14.5 % Final    Platelets 08/20/2018 214  150 - 350 K/uL Final    MPV 08/20/2018 10.6  9.2 - 12.9 fL Final    Gran # (ANC) 08/20/2018 7.1  1.8 - 7.7 K/uL Final    Lymph # 08/20/2018 1.5  1.0 - 4.8 K/uL Final    Mono # 08/20/2018 0.5  0.3 - 1.0 K/uL Final    Eos # 08/20/2018 0.0  0.0 - 0.5 K/uL Final    Baso # 08/20/2018 0.02  0.00 - 0.20 K/uL Final    Gran% 08/20/2018 77.1* 38.0 - 73.0 % Final    Lymph% 08/20/2018 16.7* 18.0 - 48.0 % Final    Mono% 08/20/2018 5.3  4.0 - 15.0 % Final    Eosinophil% 08/20/2018 0.2  0.0 - 8.0 % Final    Basophil% 08/20/2018 0.2  0.0 - 1.9 % Final    Differential Method 08/20/2018 Automated   Final   Routine Prenatal on 08/07/2018   Component Date Value Ref Range Status    Strep B Culture 08/07/2018    Final                    Value:STREPTOCOCCUS AGALACTIAE (GROUP B)  Beta-hemolytic streptococci are routinely susceptible to   penicillins,cephalosporins and carbapenems.       Note: I have independently reviewed any/all imaging/labs/tests and agree with the report (s) as documented.  Any discrepancies will be as noted/demarcated by free text.  HUSSEIN REYNA 11/1/2018    ASSESSMENT:  1. Migraine with aura and without status migrainosus, not intractable    2. Chronic intractable headache, unspecified headache type      PLAN:  - To break up headache cycle - given 6 day prednisone taper, 60 mg x 2 days, 40 mg x 2 days, 20 mg x 2 days. Take in the morning with food   - For acute migraines - given maxalt 5 mg Mlt   - Would consider trial of baclofen vs tizanidine vs amitriptyline if migraines persist after prednisone taper   - Start tracking headaches via Migraine Minh alonzo on phone   - Supplements to consider - magox 400 mg, Vit b2 400 mg, Co-Q10 200 mg daily   - RTC in 6 weeks or sooner if needed      Orders Placed This Encounter    predniSONE (DELTASONE)  20 MG tablet    rizatriptan (MAXALT-MLT) 5 MG disintegrating tablet     I have discussed realistic goals of care with patient at length as well as medication options, and need for lifestyle adjustment. I have explained that treatment will take time. We have agreed that the goal will be to reduce frequency/intensity/quantity of HA, not to be completely HA free. I have explained my non narcotic policy regarding headache treatment.    Patient to track frequency of headaches.  Patient agreeable to work on lifestyle adjustments.    Questions and concerns were sought and answered to the patient's stated verbal satisfaction.  The patient verbalizes understanding and agreement with the above stated treatment plan.     CC: Primary Doctor Suzi Medel, RICHIEP-C  [facility]  [phone]    Dr. Gregorio was available during today's encounter.

## 2018-11-02 ENCOUNTER — TELEPHONE (OUTPATIENT)
Dept: OBSTETRICS AND GYNECOLOGY | Facility: CLINIC | Age: 28
End: 2018-11-02

## 2018-11-02 NOTE — TELEPHONE ENCOUNTER
Left voice message for patient to schedule appointment from referral to [facility].  Pritesh KINCAID  [phone]

## 2018-11-05 ENCOUNTER — HOSPITAL ENCOUNTER (OUTPATIENT)
Dept: RADIOLOGY | Facility: HOSPITAL | Age: 28
Discharge: HOME | End: 2018-11-05
Attending: ADVANCED PRACTICE MIDWIFE
Payer: COMMERCIAL

## 2018-11-05 DIAGNOSIS — Z87.2 H/O CYST OF BREAST: ICD-10-CM

## 2018-11-05 DIAGNOSIS — N64.4 BREAST PAIN: ICD-10-CM

## 2018-11-05 PROCEDURE — 76642 ULTRASOUND BREAST LIMITED: CPT | Mod: TC,50,PO

## 2018-11-05 PROCEDURE — 76642 ULTRASOUND BREAST LIMITED: CPT | Mod: 26,50,, | Performed by: RADIOLOGY

## 2018-11-08 ENCOUNTER — PATIENT MESSAGE (OUTPATIENT)
Dept: NEUROLOGY | Facility: CLINIC | Age: 28
End: 2018-11-08

## 2018-11-08 RX ORDER — SUMATRIPTAN SUCCINATE 100 MG/1
TABLET ORAL
Qty: 9 TABLET | Refills: 5 | Status: SHIPPED | OUTPATIENT
Start: 2018-11-08 | End: 2018-11-20 | Stop reason: ALTCHOICE

## 2018-11-15 ENCOUNTER — TELEPHONE (OUTPATIENT)
Dept: INTERNAL MEDICINE | Facility: CLINIC | Age: 28
End: 2018-11-15

## 2018-11-20 ENCOUNTER — OFFICE VISIT (OUTPATIENT)
Dept: NEUROLOGY | Facility: CLINIC | Age: 28
End: 2018-11-20
Payer: COMMERCIAL

## 2018-11-20 VITALS
WEIGHT: 116.63 LBS | HEIGHT: 62 IN | BODY MASS INDEX: 21.46 KG/M2 | DIASTOLIC BLOOD PRESSURE: 88 MMHG | HEART RATE: 80 BPM | SYSTOLIC BLOOD PRESSURE: 138 MMHG

## 2018-11-20 DIAGNOSIS — G43.109 MIGRAINE WITH AURA AND WITHOUT STATUS MIGRAINOSUS, NOT INTRACTABLE: ICD-10-CM

## 2018-11-20 DIAGNOSIS — G43.009 MIGRAINE WITHOUT AURA AND WITHOUT STATUS MIGRAINOSUS, NOT INTRACTABLE: Primary | ICD-10-CM

## 2018-11-20 PROCEDURE — 3008F BODY MASS INDEX DOCD: CPT | Mod: CPTII,S$GLB,, | Performed by: NURSE PRACTITIONER

## 2018-11-20 PROCEDURE — 99213 OFFICE O/P EST LOW 20 MIN: CPT | Mod: S$GLB,,, | Performed by: NURSE PRACTITIONER

## 2018-11-20 PROCEDURE — 99999 PR PBB SHADOW E&M-EST. PATIENT-LVL III: CPT | Mod: PBBFAC,,, | Performed by: NURSE PRACTITIONER

## 2018-11-20 RX ORDER — RIZATRIPTAN BENZOATE 10 MG/1
TABLET, ORALLY DISINTEGRATING ORAL
Qty: 12 TABLET | Refills: 5 | Status: SHIPPED | OUTPATIENT
Start: 2018-11-20 | End: 2021-02-03

## 2018-11-20 RX ORDER — AMITRIPTYLINE HYDROCHLORIDE 10 MG/1
10 TABLET, FILM COATED ORAL NIGHTLY
Qty: 30 TABLET | Refills: 5 | Status: SHIPPED | OUTPATIENT
Start: 2018-11-20 | End: 2019-04-12

## 2018-11-20 NOTE — PROGRESS NOTES
Established Patient   SUBJECTIVE:  Patient ID: Ratna Hanson   Chief Complaint: Follow-up    History of Present Illness:  Ratna Hanson is a 28 y.o. female who presents to clinic alone for follow-up of headaches.     Recommendations made at last Office Visit on 11/1/2018:  - To break up headache cycle - given 6 day prednisone taper, 60 mg x 2 days, 40 mg x 2 days, 20 mg x 2 days. Take in the morning with food   - For acute migraines - given maxalt 5 mg Mlt   - Would consider trial of baclofen vs tizanidine vs amitriptyline if migraines persist after prednisone taper   - Start tracking headaches via Migraine [facility]h alonzo on phone   - Supplements to consider - magox 400 mg, Vit b2 400 mg, Co-Q10 200 mg daily   - RTC in 6 weeks or sooner if needed      11/20/2018 - Interval History:  Migraines are much less frequent since last visit, states her last migraine was last Thursday (5 days ago).  She has been using maxalt 5 mg Mlt for migraine abortive, which does give her some relief, though not complete relief.  She has noticed caffeine is a significant trigger for her, has not had any caffeine since last Thursday and subsequently has not had any migraines.  She feels on average, she was experiencing migraines 2-3 times per week prior to stopping caffeine.  Prednisone did help to lessen her headaches, as she is no longer experiencing daily headaches, however she would still like for the frequency of her headaches to decrease further.  She denies any change in the quality or nature of her migraines, states headaches continue to feel the same as they always have.      History of Present Illness:   27 y.o. female with no significant medical history, who presents to clinic alone for evaluation of headaches.   Headaches initially began during her third trimester and have persisted, intensifying over the last two weeks (now 6 weeks post-partum).  Headaches are located behind the left eye and radiating into the occipitalis, can last  "minutes to hours.  Pain can come on at anytime of the day.  Headaches can be triggered by movement or getting up too quickly.  Pain is described as a severe, stabbing/throbbing pain.  Associated symptoms include photophobia, phonophobia, nausea.  She does describe seeing white floating lights in her vision only in the left eye.  She denies family history of migraine.  She did not have these type of headaches during pregnancy.  She is not breast feeding.  Prior to pregnancy, she "very rarely" would get headaches. She is not breast feeding.  She does admit her sleep is poor as her  wakes up frequently throughout the night.  Also reports her neck has been bothering her and feels more tight than usual, which she attributes to looking down while holding her .      Treatments Tried and Response  Prednisone - helped some   Maxalt 5 mg Mlt - some relief   Sumatriptan 100 mg tab - made her feel nauseated  Amitriptyline - given today   Maxalt 10 mg Mlt - given today     Current Medications:    multivitamin capsule, Take 1 capsule by mouth once daily., Disp: , Rfl:     rizatriptan (MAXALT-MLT) 10 MG disintegrating tablet, Dissolve 1 tab on tongue at onset of migraine, may repeat dose in 2 hours if needed. Max 3 tabs/day. Max 3 days/week., Disp: 12 tablet, Rfl: 5    amitriptyline (ELAVIL) 10 MG tablet, Take 1 tablet (10 mg total) by mouth every evening., Disp: 30 tablet, Rfl: 5    PNV COMB NO.59/IRON/FA/DHA (PRENATAL-DHA ORAL), Take by mouth., Disp: , Rfl:     Review of Systems - as per HPI, otherwise a balanced 10 systems review is negative.    OBJECTIVE:  Vitals:  /88 (BP Location: Right arm, Patient Position: Sitting, BP Method: Large (Automatic))   Pulse 80   Ht 5' 2" (1.575 m)   Wt 52.9 kg (116 lb 10 oz)   LMP 2017 (Exact Date)   BMI 21.33 kg/m²      Physical Exam:  Constitutional: She appears well-developed and well-nourished. She is well groomed. NAD.    HENT:    Head: Normocephalic and " atraumatic  Eyes: Conjunctivae and EOM are normal. Pupils are equal, round, and reactive to light   Neck: Neck supple. Full ROM of neck.   Musculoskeletal: Normal range of motion. No joint stiffness.   Skin: Skin is warm and dry.  Psychiatric: Normal mood and affect.  Neuro: Patient is alert and oriented to person, place and time.  Speech is clear and fluent. Recent and remote memory intact.  Moves all 4 extremities against gravity. Gait and station normal.      Review of Data:   Notes from internal medicine and OBGYN reviewed   Labs:  Postpartum Visit on 10/04/2018   Component Date Value Ref Range Status    Trichomonas vaginalis 10/04/2018 Negative  Negative Final    Gardnerella vaginalis 10/04/2018 Positive* Negative Final    Candida sp 10/04/2018 Negative  Negative Final   Admission on 09/05/2018, Discharged on 09/05/2018   Component Date Value Ref Range Status    Sodium 09/05/2018 141  136 - 145 mmol/L Final    Potassium 09/05/2018 4.4  3.5 - 5.1 mmol/L Final    Chloride 09/05/2018 107  95 - 110 mmol/L Final    CO2 09/05/2018 23  23 - 29 mmol/L Final    Glucose 09/05/2018 88  70 - 110 mg/dL Final    BUN, Bld 09/05/2018 11  6 - 20 mg/dL Final    Creatinine 09/05/2018 0.6  0.5 - 1.4 mg/dL Final    Calcium 09/05/2018 9.5  8.7 - 10.5 mg/dL Final    Total Protein 09/05/2018 6.9  6.0 - 8.4 g/dL Final    Albumin 09/05/2018 3.0* 3.5 - 5.2 g/dL Final    Total Bilirubin 09/05/2018 0.4  0.1 - 1.0 mg/dL Final    Alkaline Phosphatase 09/05/2018 112  55 - 135 U/L Final    AST 09/05/2018 27  10 - 40 U/L Final    ALT 09/05/2018 25  10 - 44 U/L Final    Anion Gap 09/05/2018 11  8 - 16 mmol/L Final    eGFR if African American 09/05/2018 >60  >60 mL/min/1.73 m^2 Final    eGFR if non African American 09/05/2018 >60  >60 mL/min/1.73 m^2 Final    WBC 09/05/2018 10.71  3.90 - 12.70 K/uL Final    RBC 09/05/2018 3.46* 4.00 - 5.40 M/uL Final    Hemoglobin 09/05/2018 10.2* 12.0 - 16.0 g/dL Final    Hematocrit  09/05/2018 30.8* 37.0 - 48.5 % Final    MCV 09/05/2018 89  82 - 98 fL Final    MCH 09/05/2018 29.5  27.0 - 31.0 pg Final    MCHC 09/05/2018 33.1  32.0 - 36.0 g/dL Final    RDW 09/05/2018 13.3  11.5 - 14.5 % Final    Platelets 09/05/2018 293  150 - 350 K/uL Final    MPV 09/05/2018 9.6  9.2 - 12.9 fL Final    Gran # (ANC) 09/05/2018 8.1* 1.8 - 7.7 K/uL Final    Lymph # 09/05/2018 1.8  1.0 - 4.8 K/uL Final    Mono # 09/05/2018 0.5  0.3 - 1.0 K/uL Final    Eos # 09/05/2018 0.3  0.0 - 0.5 K/uL Final    Baso # 09/05/2018 0.02  0.00 - 0.20 K/uL Final    Gran% 09/05/2018 75.1* 38.0 - 73.0 % Final    Lymph% 09/05/2018 16.7* 18.0 - 48.0 % Final    Mono% 09/05/2018 4.7  4.0 - 15.0 % Final    Eosinophil% 09/05/2018 2.8  0.0 - 8.0 % Final    Basophil% 09/05/2018 0.2  0.0 - 1.9 % Final    Differential Method 09/05/2018 Automated   Final    Protein, Urine Random 09/05/2018 <7  0 - 15 mg/dL Final    Creatinine, Random Ur 09/05/2018 31.0  15.0 - 325.0 mg/dL Final    Prot/Creat Ratio, Ur 09/05/2018 Unable to calculate  0.00 - 0.20 Final   Admission on 09/01/2018, Discharged on 09/04/2018   Component Date Value Ref Range Status    Group & Rh 09/01/2018 A POS   Final    Indirect Tosin 09/01/2018 NEG   Final    WBC 09/01/2018 11.25  3.90 - 12.70 K/uL Final    RBC 09/01/2018 3.72* 4.00 - 5.40 M/uL Final    Hemoglobin 09/01/2018 10.8* 12.0 - 16.0 g/dL Final    Hematocrit 09/01/2018 33.3* 37.0 - 48.5 % Final    MCV 09/01/2018 90  82 - 98 fL Final    MCH 09/01/2018 29.0  27.0 - 31.0 pg Final    MCHC 09/01/2018 32.4  32.0 - 36.0 g/dL Final    RDW 09/01/2018 13.1  11.5 - 14.5 % Final    Platelets 09/01/2018 242  150 - 350 K/uL Final    MPV 09/01/2018 11.3  9.2 - 12.9 fL Final    Gran # (ANC) 09/01/2018 8.9* 1.8 - 7.7 K/uL Final    Lymph # 09/01/2018 1.4  1.0 - 4.8 K/uL Final    Mono # 09/01/2018 0.8  0.3 - 1.0 K/uL Final    Eos # 09/01/2018 0.0  0.0 - 0.5 K/uL Final    Baso # 09/01/2018 0.02  0.00 - 0.20  K/uL Final    Gran% 09/01/2018 78.9* 38.0 - 73.0 % Final    Lymph% 09/01/2018 12.7* 18.0 - 48.0 % Final    Mono% 09/01/2018 7.4  4.0 - 15.0 % Final    Eosinophil% 09/01/2018 0.1  0.0 - 8.0 % Final    Basophil% 09/01/2018 0.2  0.0 - 1.9 % Final    Differential Method 09/01/2018 Automated   Final    Protein, Urine Random 09/01/2018 16* 0 - 15 mg/dL Final    Creatinine, Random Ur 09/01/2018 10.9* 15.0 - 325.0 mg/dL Final    Prot/Creat Ratio, Ur 09/01/2018 1.47* 0.00 - 0.20 Final    Sodium 09/01/2018 136  136 - 145 mmol/L Final    Potassium 09/01/2018 4.6  3.5 - 5.1 mmol/L Final    Chloride 09/01/2018 102  95 - 110 mmol/L Final    CO2 09/01/2018 19* 23 - 29 mmol/L Final    Glucose 09/01/2018 92  70 - 110 mg/dL Final    BUN, Bld 09/01/2018 5* 6 - 20 mg/dL Final    Creatinine 09/01/2018 0.7  0.5 - 1.4 mg/dL Final    Calcium 09/01/2018 10.6* 8.7 - 10.5 mg/dL Final    Total Protein 09/01/2018 7.5  6.0 - 8.4 g/dL Final    Albumin 09/01/2018 3.3* 3.5 - 5.2 g/dL Final    Total Bilirubin 09/01/2018 0.6  0.1 - 1.0 mg/dL Final    Alkaline Phosphatase 09/01/2018 147* 55 - 135 U/L Final    AST 09/01/2018 17  10 - 40 U/L Final    ALT 09/01/2018 12  10 - 44 U/L Final    Anion Gap 09/01/2018 15  8 - 16 mmol/L Final    eGFR if African American 09/01/2018 >60  >60 mL/min/1.73 m^2 Final    eGFR if non African American 09/01/2018 >60  >60 mL/min/1.73 m^2 Final    Protein, Urine Random 09/01/2018 9  0 - 15 mg/dL Final    Creatinine, Random Ur 09/01/2018 18.2  15.0 - 325.0 mg/dL Final    Prot/Creat Ratio, Ur 09/01/2018 0.49* 0.00 - 0.20 Final   Admission on 08/22/2018, Discharged on 08/22/2018   Component Date Value Ref Range Status    WBC 08/22/2018 10.14  3.90 - 12.70 K/uL Final    RBC 08/22/2018 3.83* 4.00 - 5.40 M/uL Final    Hemoglobin 08/22/2018 11.3* 12.0 - 16.0 g/dL Final    Hematocrit 08/22/2018 34.2* 37.0 - 48.5 % Final    MCV 08/22/2018 89  82 - 98 fL Final    MCH 08/22/2018 29.5  27.0 - 31.0 pg  Final    MCHC 08/22/2018 33.0  32.0 - 36.0 g/dL Final    RDW 08/22/2018 12.6  11.5 - 14.5 % Final    Platelets 08/22/2018 226  150 - 350 K/uL Final    MPV 08/22/2018 10.9  9.2 - 12.9 fL Final    Gran # (ANC) 08/22/2018 7.8* 1.8 - 7.7 K/uL Final    Lymph # 08/22/2018 1.5  1.0 - 4.8 K/uL Final    Mono # 08/22/2018 0.7  0.3 - 1.0 K/uL Final    Eos # 08/22/2018 0.0  0.0 - 0.5 K/uL Final    Baso # 08/22/2018 0.02  0.00 - 0.20 K/uL Final    Gran% 08/22/2018 76.6* 38.0 - 73.0 % Final    Lymph% 08/22/2018 15.1* 18.0 - 48.0 % Final    Mono% 08/22/2018 7.2  4.0 - 15.0 % Final    Eosinophil% 08/22/2018 0.2  0.0 - 8.0 % Final    Basophil% 08/22/2018 0.2  0.0 - 1.9 % Final    Differential Method 08/22/2018 Automated   Final    Group & Rh 08/22/2018 A POS   Final    Indirect Tosin 08/22/2018 NEG   Final     Note: I have independently reviewed any/all imaging/labs/tests and agree with the report (s) as documented.  Any discrepancies will be as noted/demarcated by free text.  HUSSEIN REYNA 11/20/2018    ASSESSMENT:  1. Migraine without aura and without status migrainosus, not intractable    2. Migraine with aura and without status migrainosus, not intractable      PLAN:  - For migraine prevention - start Amitriptyline 10 mg nightly, would consider titrating dose in future if warranted   - For migraine abortive - given maxalt 10 mg Mlt   - Stressed the importance of taking maxalt 10 mg Mlt as soon as her migraine begins, two hours later she is to repeat the dose with ibuprofen 600 mg liquid gel caps.  If needed, she can take a third dose of maxalt in a day, however if 3 doses of maxalt are required to abort her migraine, will likely need to change abortive therapy to an alternative triptan.    - Continue tracking headaches   - Discussed goals of therapy are to decrease the frequency, intensity, and duration of headaches  - RTC in 3 months or sooner if needed      Orders Placed This Encounter    amitriptyline (ELAVIL)  10 MG tablet    rizatriptan (MAXALT-MLT) 10 MG disintegrating tablet     Questions and concerns were sought and answered to the patient's stated verbal satisfaction.  The patient verbalizes understanding and agreement with the above stated treatment plan.     CC: Primary Doctor Suzi Medel, FNP-C  [facility] [place]   [phone]    Dr. Gregorio was available during today's encounter.

## 2018-11-28 ENCOUNTER — TELEPHONE (OUTPATIENT)
Dept: OBSTETRICS AND GYNECOLOGY | Facility: CLINIC | Age: 28
End: 2018-11-28

## 2018-11-28 ENCOUNTER — OFFICE VISIT (OUTPATIENT)
Dept: UROLOGY | Facility: CLINIC | Age: 28
End: 2018-11-28
Payer: COMMERCIAL

## 2018-11-28 VITALS
HEART RATE: 71 BPM | WEIGHT: 115.5 LBS | BODY MASS INDEX: 21.25 KG/M2 | DIASTOLIC BLOOD PRESSURE: 66 MMHG | HEIGHT: 62 IN | SYSTOLIC BLOOD PRESSURE: 112 MMHG

## 2018-11-28 DIAGNOSIS — G43.009 MIGRAINE WITHOUT AURA AND WITHOUT STATUS MIGRAINOSUS, NOT INTRACTABLE: Primary | ICD-10-CM

## 2018-11-28 DIAGNOSIS — R10.9 FLANK PAIN: ICD-10-CM

## 2018-11-28 PROCEDURE — 99203 OFFICE O/P NEW LOW 30 MIN: CPT | Mod: S$GLB,,, | Performed by: UROLOGY

## 2018-11-28 PROCEDURE — 3008F BODY MASS INDEX DOCD: CPT | Mod: CPTII,S$GLB,, | Performed by: UROLOGY

## 2018-11-28 PROCEDURE — 99999 PR PBB SHADOW E&M-EST. PATIENT-LVL III: CPT | Mod: PBBFAC,,, | Performed by: UROLOGY

## 2018-11-28 NOTE — TELEPHONE ENCOUNTER
Spoke with Ratna  Cramping ended up being kidney stone  No further cramping/pain  Still considering options for contraception  Discussed r/b/a   She's considering depo and will let me know    Rocío Farias CNM/NP  Certified Nurse Midwife/Nurse Practitioner  11/28/2018 1:36 PM        ----- Message from Samreen Kelly MA sent at 11/27/2018 12:34 PM CST -----  Regarding: Call return  Sen Alford! Patient returned your call during lunch. Please call.    Thanks!  David

## 2018-11-28 NOTE — PROGRESS NOTES
Subjective:       Patient ID: Ratan Hanson is a 28 y.o. female.    Chief Complaint: Flank Pain (right side on and off for 4 days and urinating problems)    Ratna Hanson is a 28 y.o. Female here for pain and suspected kidney stone.   She had abdominal pain and right flank pain. She then had discomfort with urination and then pain went away.   She had nausea with the pain. She did take a tylenol and ibuprofen.   Had looser stools during this episode, but better now.     She has been fine since.     No previous history of kidney stones.   Father gets kidney stones often.      vaginal deliveries.   3 months ago delivery    No LUTS normally.   Some stress incontinence rare.     Taking emergent C lately.   She is on a MVI. She is not breastfeeding.  Not a lot of spinach. Mostly water every day. She tries to push fluids.   Good bit of salt.   Not a lot of meat.     Just started maxalt and elavil 10mg for migraines.             Past Surgical History:   Procedure Laterality Date    AUGMENTATION OF BREAST Bilateral 2016    BREAST SURGERY      augmentation bilateral    CHOLECYSTECTOMY      WISDOM TOOTH EXTRACTION         Past Medical History:   Diagnosis Date    Anxiety     hx lexapro and seroquel use    Breast disorder     Benign breast cysts; negative imaging    Cholestasis during pregnancy in third trimester 2018    Postpartum depression     Used seroquil x ~ 1 month    Pre-eclampsia affecting pregnancy, antepartum 2018       Social History     Socioeconomic History    Marital status:      Spouse name: Not on file    Number of children: Not on file    Years of education: Not on file    Highest education level: Not on file   Social Needs    Financial resource strain: Not on file    Food insecurity - worry: Not on file    Food insecurity - inability: Not on file    Transportation needs - medical: Not on file    Transportation needs - non-medical: Not on file   Occupational History    Not  on file   Tobacco Use    Smoking status: Never Smoker    Smokeless tobacco: Never Used   Substance and Sexual Activity    Alcohol use: Yes     Comment: none since pregnancy    Drug use: No    Sexual activity: Yes     Birth control/protection: None     Comment: planning pregnancy   Other Topics Concern    Not on file   Social History Narrative     Gene Owen is their first child together (his first child).      She has a 12 year old, Gabo       Family History   Problem Relation Age of Onset    Heart disease Mother     Heart attacks under age 50 Mother     No Known Problems Father     Hyperlipidemia Sister     Heart disease Paternal Grandfather     No Known Problems Paternal Grandmother     No Known Problems Maternal Grandmother     Breast cancer Neg Hx     Ovarian cancer Neg Hx     Colon cancer Neg Hx        Current Outpatient Medications   Medication Sig Dispense Refill    amitriptyline (ELAVIL) 10 MG tablet Take 1 tablet (10 mg total) by mouth every evening. 30 tablet 5    multivitamin capsule Take 1 capsule by mouth once daily.      rizatriptan (MAXALT-MLT) 10 MG disintegrating tablet Dissolve 1 tab on tongue at onset of migraine, may repeat dose in 2 hours if needed. Max 3 tabs/day. Max 3 days/week. 12 tablet 5    PNV COMB NO.59/IRON/FA/DHA (PRENATAL-DHA ORAL) Take by mouth.       No current facility-administered medications for this visit.        Review of patient's allergies indicates:  No Known Allergies     BMP  Lab Results   Component Value Date     09/05/2018    K 4.4 09/05/2018     09/05/2018    CO2 23 09/05/2018    BUN 11 09/05/2018    CREATININE 0.6 09/05/2018    CALCIUM 9.5 09/05/2018    ANIONGAP 11 09/05/2018    ESTGFRAFRICA >60 09/05/2018    EGFRNONAA >60 09/05/2018       Review of Systems   Constitutional: Negative for chills, fever and unexpected weight change.   HENT: Negative for nosebleeds.    Eyes: Negative for visual disturbance.   Respiratory:  Negative for chest tightness.    Cardiovascular: Negative for chest pain.   Gastrointestinal: Negative for diarrhea.   Genitourinary: Negative for dysuria, frequency, hematuria, nocturia and urgency.   Musculoskeletal: Negative for myalgias.   Skin: Negative for rash.   Neurological: Negative for seizures.   Hematological: Does not bruise/bleed easily.   Psychiatric/Behavioral: Negative for behavioral problems.     Objective:      Physical Exam   Vitals reviewed.  Constitutional: She is oriented to person, place, and time. She appears well-developed and well-nourished.   HENT:   Head: Normocephalic and atraumatic.   Eyes: No scleral icterus.   Cardiovascular: Normal rate and regular rhythm.    Pulmonary/Chest: Effort normal. No respiratory distress.   Abdominal: She exhibits no mass.   No cva ttp   Musculoskeletal: She exhibits no tenderness.   Lymphadenopathy:     She has no cervical adenopathy.   Neurological: She is alert and oriented to person, place, and time.   Skin: Skin is warm and dry. No rash noted.     Psychiatric: She has a normal mood and affect.     urine dip clear, ph 6  Assessment:       1. Migraine without aura and without status migrainosus, not intractable    2. Flank pain        Plan:   Ratna was seen today for flank pain.    Diagnoses and all orders for this visit:    Migraine without aura and without status migrainosus, not intractable    Flank pain  -     CT Renal Stone Study ABD Pelvis WO; Future

## 2018-11-29 ENCOUNTER — PATIENT MESSAGE (OUTPATIENT)
Dept: UROLOGY | Facility: CLINIC | Age: 28
End: 2018-11-29

## 2018-11-29 ENCOUNTER — HOSPITAL ENCOUNTER (OUTPATIENT)
Dept: RADIOLOGY | Facility: HOSPITAL | Age: 28
Discharge: HOME | End: 2018-11-29
Attending: UROLOGY
Payer: COMMERCIAL

## 2018-11-29 DIAGNOSIS — R10.9 FLANK PAIN: ICD-10-CM

## 2018-11-29 PROCEDURE — 74176 CT ABD & PELVIS W/O CONTRAST: CPT | Mod: TC

## 2018-11-29 PROCEDURE — 74176 CT ABD & PELVIS W/O CONTRAST: CPT | Mod: 26,,, | Performed by: RADIOLOGY

## 2019-04-12 ENCOUNTER — OFFICE VISIT (OUTPATIENT)
Dept: INTERNAL MEDICINE | Facility: CLINIC | Age: 29
End: 2019-04-12
Payer: COMMERCIAL

## 2019-04-12 VITALS
DIASTOLIC BLOOD PRESSURE: 96 MMHG | OXYGEN SATURATION: 97 % | WEIGHT: 103.38 LBS | HEART RATE: 76 BPM | SYSTOLIC BLOOD PRESSURE: 112 MMHG | HEIGHT: 62 IN | BODY MASS INDEX: 19.02 KG/M2

## 2019-04-12 DIAGNOSIS — F31.9 BIPOLAR 1 DISORDER: Primary | ICD-10-CM

## 2019-04-12 DIAGNOSIS — F41.9 ANXIETY: ICD-10-CM

## 2019-04-12 DIAGNOSIS — G43.009 MIGRAINE WITHOUT AURA AND WITHOUT STATUS MIGRAINOSUS, NOT INTRACTABLE: ICD-10-CM

## 2019-04-12 PROBLEM — O14.90 PRE-ECLAMPSIA AFFECTING PREGNANCY, ANTEPARTUM: Status: RESOLVED | Noted: 2018-09-01 | Resolved: 2019-04-12

## 2019-04-12 PROCEDURE — 3008F PR BODY MASS INDEX (BMI) DOCUMENTED: ICD-10-PCS | Mod: CPTII,S$GLB,, | Performed by: FAMILY MEDICINE

## 2019-04-12 PROCEDURE — 3008F BODY MASS INDEX DOCD: CPT | Mod: CPTII,S$GLB,, | Performed by: FAMILY MEDICINE

## 2019-04-12 PROCEDURE — 99204 PR OFFICE/OUTPT VISIT, NEW, LEVL IV, 45-59 MIN: ICD-10-PCS | Mod: S$GLB,,, | Performed by: FAMILY MEDICINE

## 2019-04-12 PROCEDURE — 99999 PR PBB SHADOW E&M-EST. PATIENT-LVL IV: ICD-10-PCS | Mod: PBBFAC,,, | Performed by: FAMILY MEDICINE

## 2019-04-12 PROCEDURE — 99204 OFFICE O/P NEW MOD 45 MIN: CPT | Mod: S$GLB,,, | Performed by: FAMILY MEDICINE

## 2019-04-12 PROCEDURE — 99999 PR PBB SHADOW E&M-EST. PATIENT-LVL IV: CPT | Mod: PBBFAC,,, | Performed by: FAMILY MEDICINE

## 2019-04-12 RX ORDER — LORAZEPAM 0.5 MG/1
0.5 TABLET ORAL DAILY PRN
Qty: 20 TABLET | Refills: 0 | Status: SHIPPED | OUTPATIENT
Start: 2019-04-12 | End: 2019-07-31 | Stop reason: SDUPTHER

## 2019-04-12 RX ORDER — QUETIAPINE FUMARATE 25 MG/1
25 TABLET, FILM COATED ORAL NIGHTLY
Qty: 30 TABLET | Refills: 1 | Status: SHIPPED | OUTPATIENT
Start: 2019-04-12 | End: 2019-06-07 | Stop reason: SDUPTHER

## 2019-04-12 NOTE — PROGRESS NOTES
Subjective:      Patient ID: Ratna Hanson is a 28 y.o. female.    Chief Complaint: Depression and Establish Care    HPI  This patient is new to me.   Ratna Hanson is a 28 y.o. year old female with migraines, bipolar 1 disorder who presents today to establish care. She also complains of worsening bipolar symptoms.     Diagnosed with bipolar disorder about 15 years ago. Used to take Seroquel. Did not tolerate Lexapro (made her more depressed and suicidal). Managing without meds for past 10 years - with diet and exercise. Since delivery of her baby 7 months ago - symptoms has been worsening. Mood is down. More manic episodes. Manic episodes occur 3x/month - feel like anxiety attacks, more energy, more paranoid, irritable. Has learned to control reckless behavior. Some SI - says she has passive thoughts that have been present for years. Has no plans to harm herself - would not do that to her children.  Has  who is supportive.  Does not feel she has the best social support. Also feels down due to spending time at home all day with infant. Did sign up for "[employer] walking group.   Does have panic attacks.   Not breastfeeding.     Stopped amitriptyline for migraines. Worsened manic episodes. Takes Maxalt PRN. Sees neurology.     OB/GYN History   G*P2  LMP:   Sexually active:  Contraception:   Last Pap smear: 5/1/2017 - normal. Done at  (see record scanned into media section)    I personally reviewed Past Medical History, Past Surgical History, Social History, and Family History    Review of Systems   Constitutional: Negative for activity change and unexpected weight change.   HENT: Negative for hearing loss, rhinorrhea and trouble swallowing.    Eyes: Positive for visual disturbance. Negative for discharge.   Respiratory: Positive for chest tightness. Negative for wheezing.    Cardiovascular: Negative for chest pain and palpitations.   Gastrointestinal: Positive for blood in stool. Negative for constipation,  "diarrhea and vomiting.   Endocrine: Negative for polydipsia and polyuria.   Genitourinary: Negative for difficulty urinating, dysuria, hematuria and menstrual problem.   Musculoskeletal: Negative for arthralgias, joint swelling and neck pain.   Neurological: Positive for headaches. Negative for weakness.   Psychiatric/Behavioral: Positive for dysphoric mood. Negative for confusion.       Objective:      Vitals:    04/12/19 0916   BP: (!) 112/96   Pulse: 76   SpO2: 97%   Weight: 46.9 kg (103 lb 6.3 oz)   Height: 5' 2" (1.575 m)     Physical Exam   Constitutional: She is oriented to person, place, and time. She appears well-developed and well-nourished. No distress.   HENT:   Head: Normocephalic and atraumatic.   Right Ear: Hearing normal.   Left Ear: Hearing normal.   Nose: Nose normal.   Mouth/Throat: Oropharynx is clear and moist and mucous membranes are normal. No oropharyngeal exudate.   Eyes: Pupils are equal, round, and reactive to light. Conjunctivae and lids are normal.   Neck: Normal range of motion. No thyroid mass and no thyromegaly present.   Cardiovascular: Normal rate, regular rhythm, S1 normal, S2 normal and intact distal pulses.   No murmur heard.  No lower extremity edema.    Pulmonary/Chest: Effort normal and breath sounds normal. No respiratory distress.   Abdominal: Soft. Normal appearance and bowel sounds are normal. There is no tenderness.   Lymphadenopathy:     She has no cervical adenopathy.        Right: No supraclavicular adenopathy present.        Left: No supraclavicular adenopathy present.   Neurological: She is alert and oriented to person, place, and time.   Skin: Skin is warm and dry. No rash noted.   Psychiatric: She has a normal mood and affect. Her behavior is normal. Thought content normal.   Tearful at times    Nursing note and vitals reviewed.      Assessment:       1. Bipolar 1 disorder    2. Anxiety    3. Migraine without aura and without status migrainosus, not intractable     "    Plan:   Diagnoses and all orders for this visit:    Bipolar 1 disorder         - PHQ-9 score of 13 today, + MDQ screen          - Will start low dose Seroquel and titrate up based on patient response. Will try to get patient into psychiatry ASAP to resume care.   -     Ambulatory Referral to Psychiatry  -     QUEtiapine (SEROQUEL) 25 MG Tab; Take 1 tablet (25 mg total) by mouth every evening.    Anxiety       - DUNCAN 7 score of 11 today        - Cannot use SSRIs. Discussed using Ativan sparingly for panic attacks. She expressed understanding.   -     Ambulatory Referral to Psychiatry  -     LORazepam (ATIVAN) 0.5 MG tablet; Take 1 tablet (0.5 mg total) by mouth daily as needed for Anxiety.    Migraine without aura and without status migrainosus, not intractable        - Had to d/c amitriptyline due to negative effect on her mood. Continue PRN Maxalt. F/u with neurology.

## 2019-04-12 NOTE — PATIENT INSTRUCTIONS
Local psychiatrists:  Dr. Tony Rudolph - [phone]  Dr. Tha Lanier - [phone]  Dr. Chana Horner - [phone]  Dr. Marilou Jasso - [phone]  Dr. Romain Christine - [phone]  Dr. Belinda Joseph - [phone]    Call [phone] for [facility]. Also, contact your insurance about psychiatrists on your plan.

## 2019-04-22 ENCOUNTER — PATIENT MESSAGE (OUTPATIENT)
Dept: INTERNAL MEDICINE | Facility: CLINIC | Age: 29
End: 2019-04-22

## 2019-06-07 DIAGNOSIS — F31.9 BIPOLAR 1 DISORDER: ICD-10-CM

## 2019-06-07 RX ORDER — QUETIAPINE FUMARATE 25 MG/1
TABLET, FILM COATED ORAL
Qty: 30 TABLET | Refills: 0 | Status: SHIPPED | OUTPATIENT
Start: 2019-06-07 | End: 2019-07-06 | Stop reason: SDUPTHER

## 2019-07-06 DIAGNOSIS — F31.9 BIPOLAR 1 DISORDER: ICD-10-CM

## 2019-07-07 RX ORDER — QUETIAPINE FUMARATE 25 MG/1
TABLET, FILM COATED ORAL
Qty: 30 TABLET | Refills: 0 | Status: SHIPPED | OUTPATIENT
Start: 2019-07-07 | End: 2019-08-04 | Stop reason: SDUPTHER

## 2019-07-31 DIAGNOSIS — F41.9 ANXIETY: ICD-10-CM

## 2019-08-01 RX ORDER — LORAZEPAM 0.5 MG/1
TABLET ORAL
Qty: 20 TABLET | Refills: 0 | Status: SHIPPED | OUTPATIENT
Start: 2019-08-01 | End: 2019-09-09 | Stop reason: SDUPTHER

## 2019-08-04 DIAGNOSIS — F31.9 BIPOLAR 1 DISORDER: ICD-10-CM

## 2019-08-04 RX ORDER — QUETIAPINE FUMARATE 25 MG/1
TABLET, FILM COATED ORAL
Qty: 30 TABLET | Refills: 0 | Status: SHIPPED | OUTPATIENT
Start: 2019-08-04 | End: 2019-09-15 | Stop reason: SDUPTHER

## 2019-09-09 DIAGNOSIS — F41.9 ANXIETY: ICD-10-CM

## 2019-09-09 RX ORDER — LORAZEPAM 0.5 MG/1
TABLET ORAL
Qty: 20 TABLET | Refills: 0 | Status: SHIPPED | OUTPATIENT
Start: 2019-09-09 | End: 2019-10-28 | Stop reason: SDUPTHER

## 2019-09-09 NOTE — TELEPHONE ENCOUNTER
Please call patient and ask if she has been able to make an appointment with a psychiatrist. I recommend that she see a psychiatrist eventually due to her bipolar disorder.    Thanks!

## 2019-09-13 ENCOUNTER — PATIENT MESSAGE (OUTPATIENT)
Dept: INTERNAL MEDICINE | Facility: CLINIC | Age: 29
End: 2019-09-13

## 2019-09-15 DIAGNOSIS — F31.9 BIPOLAR 1 DISORDER: ICD-10-CM

## 2019-09-15 RX ORDER — QUETIAPINE FUMARATE 25 MG/1
TABLET, FILM COATED ORAL
Qty: 30 TABLET | Refills: 0 | Status: SHIPPED | OUTPATIENT
Start: 2019-09-15 | End: 2019-10-16 | Stop reason: SDUPTHER

## 2019-09-18 ENCOUNTER — OFFICE VISIT (OUTPATIENT)
Dept: URGENT CARE | Facility: CLINIC | Age: 29
End: 2019-09-18
Payer: COMMERCIAL

## 2019-09-18 VITALS
WEIGHT: 96 LBS | BODY MASS INDEX: 17.66 KG/M2 | SYSTOLIC BLOOD PRESSURE: 122 MMHG | HEIGHT: 62 IN | OXYGEN SATURATION: 100 % | RESPIRATION RATE: 18 BRPM | DIASTOLIC BLOOD PRESSURE: 87 MMHG | TEMPERATURE: 100 F | HEART RATE: 98 BPM

## 2019-09-18 DIAGNOSIS — J10.1 TYPE A INFLUENZA: Primary | ICD-10-CM

## 2019-09-18 DIAGNOSIS — R50.9 FEVER, UNSPECIFIED FEVER CAUSE: ICD-10-CM

## 2019-09-18 LAB
CTP QC/QA: YES
FLUAV AG NPH QL: POSITIVE
FLUBV AG NPH QL: NEGATIVE

## 2019-09-18 PROCEDURE — 3008F PR BODY MASS INDEX (BMI) DOCUMENTED: ICD-10-PCS | Mod: CPTII,S$GLB,, | Performed by: NURSE PRACTITIONER

## 2019-09-18 PROCEDURE — 3008F BODY MASS INDEX DOCD: CPT | Mod: CPTII,S$GLB,, | Performed by: NURSE PRACTITIONER

## 2019-09-18 PROCEDURE — 99213 PR OFFICE/OUTPT VISIT, EST, LEVL III, 20-29 MIN: ICD-10-PCS | Mod: S$GLB,,, | Performed by: NURSE PRACTITIONER

## 2019-09-18 PROCEDURE — 87804 POCT INFLUENZA A/B: ICD-10-PCS | Mod: QW,S$GLB,, | Performed by: NURSE PRACTITIONER

## 2019-09-18 PROCEDURE — 87804 INFLUENZA ASSAY W/OPTIC: CPT | Mod: QW,S$GLB,, | Performed by: NURSE PRACTITIONER

## 2019-09-18 PROCEDURE — 99213 OFFICE O/P EST LOW 20 MIN: CPT | Mod: S$GLB,,, | Performed by: NURSE PRACTITIONER

## 2019-09-18 RX ORDER — OSELTAMIVIR PHOSPHATE 75 MG/1
75 CAPSULE ORAL 2 TIMES DAILY
Qty: 10 CAPSULE | Refills: 0 | Status: SHIPPED | OUTPATIENT
Start: 2019-09-18 | End: 2019-09-23

## 2019-09-18 RX ORDER — IBUPROFEN 600 MG/1
600 TABLET ORAL EVERY 8 HOURS PRN
Qty: 30 TABLET | Refills: 0 | Status: SHIPPED | OUTPATIENT
Start: 2019-09-18 | End: 2021-02-03

## 2019-09-18 NOTE — PATIENT INSTRUCTIONS
Return to Urgent Care or go to ER if symptoms worsen or fail to improve.  Follow up with PCP as recommended for further management.     Use pseudoephedrine (behind the counter) to decongest-- (the little red pills).  Pseudoephedrine 30 mg up to 240 mg /day. It can raise your blood pressure and give you palpitations.  Do not buy any oral medications with phenylephrine as it has not been proven effective as a decongestant at the OTC dose.     Use Nasal Saline to mechanically move any post nasal drip from your eustachian tube or from the back of your throat. Use the nasal saline prior to using any topical nasal sprays to help clear the mucus so the medication is able to get to the mucus membranes.      Sometimes Nyquil at night is beneficial to help you get some rest, however it is sedating and does have an antihistamine, as well as tylenol.  The Nyquil behind the pharmacy counter also has the decongestant, pseudoephedrine as an additional ingredient.       The Flu (Influenza)     The virus that causes the flu spreads through the air in droplets when someone who has the flu coughs, sneezes, laughs, or talks.   The flu (influenza) is an infection that affects your respiratory tract. This tract is made up of your mouth, nose, and lungs, and the passages between them. Unlike a cold, the flu can make you very ill. And it can lead to pneumonia, a serious lung infection. The flu can have serious complications and even cause death.  Who is at risk for the flu?  Anyone can get the flu. But you are more likely to become infected if you:  · Have a weakened immune system  · Work in a healthcare setting where you may be exposed to flu germs  · Live or work with someone who has the flu  · Havent had an annual flu shot  How does the flu spread?  The flu is caused by a virus. The virus spreads through the air in droplets when someone who has the flu coughs, sneezes, laughs, or talks. You can become infected when you inhale  these viruses directly. You can also become infected when you touch a surface on which the droplets have landed and then transfer the germs to your eyes, nose, or mouth. Touching used tissues, or sharing utensils, drinking glasses, or a toothbrush from an infected person can expose you to flu viruses, too.  What are the symptoms of the flu?  Flu symptoms tend to come on quickly and may last a few days to a few weeks. They include:  · Fever usually higher than 100.4°F  (38°C) and chills  · Sore throat and headache  · Dry cough  · Runny nose  · Tiredness and weakness  · Muscle aches  Who is at risk for flu complications?  For some people, the flu can be very serious. The risk for complications is greater for:  · Children younger than age 5  · Adults ages 65 and older  · People with a chronic illness such as diabetes or heart, kidney, or lung disease  · People who live in a nursing home or long-term care facility   How is the flu treated?  The flu usually gets better after 7 days or so. In some cases, your healthcare provider may prescribe an antiviral medicine. This may help you get well a little sooner. For the medicine to help, you need to take it as soon as possible (ideally within 48 hours) after your symptoms start. If you develop pneumonia or other serious illness, you may need to stay in the hospital.  Easing flu symptoms  · Drink lots of fluids such as water, juice, and warm soup. A good rule is to drink enough so that you urinate your normal amount.  · Get plenty of rest.  · Ask your healthcare provider what to take for fever and pain.  · Call your provider if your fever is 100.4°F (38°C) or higher, or you become dizzy, lightheaded, or short of breath.  Taking steps to protect others  · Wash your hands often, especially after coughing or sneezing. Or clean your hands with an alcohol-based hand  containing at least 60% alcohol.  · Cough or sneeze into a tissue. Then throw the tissue away and wash your  hands. If you dont have a tissue, cough and sneeze into your elbow.  · Stay home until at least 24 hours after you no longer have a fever or chills. Be sure the fever isnt being hidden by fever-reducing medicine.  · Dont share food, utensils, drinking glasses, or a toothbrush with others.  · Ask your healthcare provider if others in your household should get antiviral medicine to help them avoid infection.  How can the flu be prevented?  · One of the best ways to avoid the flu is to get a flu vaccine each year. The virus that causes the flu changes from year to year. For that reason, healthcare providers recommend getting the flu vaccine each year, as soon as it's available in your area. The vaccine is given as a shot. Your healthcare provider can tell you which vaccine is right for you. A nasal spray is also available but is not recommended for the 1815-4960 flu season. The CDC says this is because the nasal spray did not seem to protect against the flu over the last several flu seasons. In the past, it was meant for people ages 2 to 49.  · Wash your hands often. Frequent handwashing is a proven way to help prevent infection.  · Carry an alcohol-based hand gel containing at least 60% alcohol. Use it when you can't use soap and water. Then wash your hands as soon as you can.  · Avoid touching your eyes, nose, and mouth.  · At home and work, clean phones, computer keyboards, and toys often with disinfectant wipes.  · If possible, avoid close contact with others who have the flu or symptoms of the flu.  Handwashing tips  Handwashing is one of the best ways to prevent many common infections. If you are caring for or visiting someone with the flu, wash your hands each time you enter and leave the room. Follow these steps:  · Use warm water and plenty of soap. Rub your hands together well.  · Clean the whole hand, including under your nails, between your fingers, and up the wrists.  · Wash for at least  15 seconds.  · Rinse, letting the water run down your fingers, not up your wrists.  · Dry your hands well. Use a paper towel to turn off the faucet and open the door.  Using alcohol-based hand   Alcohol-based hand  are also a good choice. Use them when you can't use soap and water. Follow these steps:  · Squeeze about a tablespoon of gel into the palm of one hand.  · Rub your hands together briskly, cleaning the backs of your hands, the palms, between your fingers, and up the wrists.  · Rub until the gel is gone and your hands are completely dry.  Preventing the flu in healthcare settings  The flu is a special concern for people in hospitals and long-term care facilities. To help prevent the spread of flu, many hospitals and nursing homes take these steps:  · Healthcare providers wash their hands or use an alcohol-based hand  before and after treating each patient.  · People with the flu have private rooms and bathrooms or share a room with someone with the same infection.  · People who are at high risk for the flu but don't have it are encouraged to get the flu and pneumonia vaccines.  · All healthcare workers are encouraged or required to get flu shots.   Date Last Reviewed: 12/1/2016 © 2000-2017 [employer]. [address] [place] [address], [place] [address]. All rights reserved. This information is not intended as a substitute for professional medical care. Always follow your healthcare professional's instructions.        Oseltamivir capsules  What is this medicine?  OSELTAMIVIR (os el DENNY i vir) is an antiviral medicine. It is used to prevent and to treat some kinds of influenza or the flu. It will not work for colds or other viral infections.  How should I use this medicine?  Take this medicine by mouth with a glass of water. Follow the directions on the prescription label. Start this medicine at the first sign of flu symptoms. You can take it with or without food. If it upsets  your stomach, take it with food. Take your medicine at regular intervals. Do not take your medicine more often than directed. Take all of your medicine as directed even if you think you are better. Do not skip doses or stop your medicine early.  Talk to your pediatrician regarding the use of this medicine in children. While this drug may be prescribed for children as young as 14 days for selected conditions, precautions do apply.  What side effects may I notice from receiving this medicine?  Side effects that you should report to your doctor or health care professional as soon as possible:  · allergic reactions like skin rash, itching or hives, swelling of the face, lips, or tongue  · anxiety, confusion, unusual behavior  · breathing problems  · hallucination, loss of contact with reality  · redness, blistering, peeling or loosening of the skin, including inside the mouth  · seizures  Side effects that usually do not require medical attention (report to your doctor or health care professional if they continue or are bothersome):  · diarrhea  · headache  · nausea, vomiting  · pain  What may interact with this medicine?  Interactions are not expected.  What if I miss a dose?  If you miss a dose, take it as soon as you remember. If it is almost time for your next dose (within 2 hours), take only that dose. Do not take double or extra doses.  Where should I keep my medicine?  Keep out of the reach of children.  Store at room temperature between 15 and 30 degrees C (59 and 86 degrees F). Throw away any unused medicine after the expiration date.  What should I tell my health care provider before I take this medicine?  They need to know if you have any of the following conditions:  · heart disease  · immune system problems  · kidney disease  · liver disease  · lung disease  · an unusual or allergic reaction to oseltamivir, other medicines, foods, dyes, or preservatives  · pregnant or trying to get  pregnant  · breast-feeding  What should I watch for while using this medicine?  Visit your doctor or health care professional for regular check ups. Tell your doctor if your symptoms do not start to get better or if they get worse.  If you have the flu, you may be at an increased risk of developing seizures, confusion, or abnormal behavior. This occurs early in the illness, and more frequently in children and teens. These events are not common, but may result in accidental injury to the patient. Families and caregivers of patients should watch for signs of unusual behavior and contact a doctor or health care professional right away if the patient shows signs of unusual behavior.  This medicine is not a substitute for the flu shot. Talk to your doctor each year about an annual flu shot.  NOTE:This sheet is a summary. It may not cover all possible information. If you have questions about this medicine, talk to your doctor, pharmacist, or health care provider. Copyright© 2017 [employer]

## 2019-09-18 NOTE — PROGRESS NOTES
"Subjective:       Patient ID: Ratna Hanson is a 28 y.o. female.    Vitals:  height is 5' 2" (1.575 m) and weight is 43.5 kg (96 lb). Her oral temperature is 100.2 °F (37.9 °C). Her blood pressure is 122/87 and her pulse is 98. Her respiration is 18 and oxygen saturation is 100%.     Chief Complaint: Fever    This is a 28 y.o. female who presents today with a chief complaint of fever, dry cough, dizziness, left ear pain and sore throat that came on suddenly yesterday afternoon. She gets a sharp pain in her chest when coughing. She used Zicam and a generic otc Tamiflu  (Oscillococcinum) without relief. Fever was 102.0F this morning. Tylenol at 7:00 am. She went to a [employer] party this past week-end.    URI    This is a new problem. The current episode started yesterday. The problem has been gradually worsening. The maximum temperature recorded prior to her arrival was 102 - 102.9 F. The fever has been present for less than 1 day. Associated symptoms include chest pain, congestion, coughing, ear pain, headaches, neck pain, rhinorrhea and a sore throat. Pertinent negatives include no diarrhea, dysuria, joint pain, joint swelling, nausea, plugged ear sensation, rash, sinus pain, sneezing, vomiting or wheezing. She has tried acetaminophen and increased fluids for the symptoms. The treatment provided mild relief.       Constitution: Positive for chills, fatigue and fever. Negative for sweating.   HENT: Positive for ear pain, congestion and sore throat. Negative for sinus pain, sinus pressure and voice change.    Neck: Positive for neck pain. Negative for painful lymph nodes.   Cardiovascular: Positive for chest pain and sob on exertion.   Eyes: Negative for eye redness.   Respiratory: Positive for cough. Negative for chest tightness, sputum production, bloody sputum, COPD, shortness of breath, stridor, wheezing and asthma.    Gastrointestinal: Negative for nausea, vomiting and diarrhea.   Genitourinary: Negative for " dysuria and urgency.   Musculoskeletal: Negative for trauma and muscle ache.   Skin: Negative for rash.   Allergic/Immunologic: Negative for seasonal allergies, asthma and sneezing.   Neurological: Positive for dizziness and headaches. Negative for light-headedness and altered mental status.   Hematologic/Lymphatic: Negative for swollen lymph nodes.   Psychiatric/Behavioral: Negative for altered mental status and confusion.       Objective:      Physical Exam   Constitutional: She is oriented to person, place, and time. She appears well-developed and well-nourished. She is cooperative.  Non-toxic appearance. She appears ill. No distress.   HENT:   Head: Normocephalic and atraumatic.   Right Ear: Hearing, tympanic membrane, external ear and ear canal normal.   Left Ear: Hearing, tympanic membrane, external ear and ear canal normal.   Nose: Mucosal edema and rhinorrhea present. No nasal deformity. No epistaxis. Right sinus exhibits no maxillary sinus tenderness and no frontal sinus tenderness. Left sinus exhibits no maxillary sinus tenderness and no frontal sinus tenderness.   Mouth/Throat: Uvula is midline and mucous membranes are normal. No uvula swelling. Posterior oropharyngeal erythema present. No oropharyngeal exudate or tonsillar abscesses. No tonsillar exudate.   Eyes: Pupils are equal, round, and reactive to light. Conjunctivae, EOM and lids are normal. No scleral icterus.   Neck: Trachea normal, normal range of motion and phonation normal. Neck supple. No neck rigidity.   Cardiovascular: Normal rate, regular rhythm and normal heart sounds.   Pulses:       Radial pulses are 2+ on the right side, and 2+ on the left side.   Pulmonary/Chest: Effort normal and breath sounds normal. No respiratory distress.   Abdominal: Soft. Normal appearance. There is no tenderness.   Musculoskeletal: Normal range of motion. She exhibits no edema.   Lymphadenopathy:        Head (right side): No submental, no submandibular, no  tonsillar, no preauricular and no posterior auricular adenopathy present.        Head (left side): No submental, no submandibular, no tonsillar, no preauricular and no posterior auricular adenopathy present.     She has no cervical adenopathy.   Neurological: She is alert and oriented to person, place, and time. She exhibits normal muscle tone. Coordination and gait normal.   Skin: Skin is warm, dry and intact. No rash noted. She is not diaphoretic.   Psychiatric: She has a normal mood and affect. Her speech is normal.   Nursing note and vitals reviewed.        Results for orders placed or performed in visit on 09/18/19   POCT Influenza A/B   Result Value Ref Range    Rapid Influenza A Ag Positive (A) Negative    Rapid Influenza B Ag Negative Negative     Acceptable Yes        Assessment:       1. Type A influenza    2. Fever, unspecified fever cause        Plan:         Type A influenza  -     oseltamivir (TAMIFLU) 75 MG capsule; Take 1 capsule (75 mg total) by mouth 2 (two) times daily. for 5 days  Dispense: 10 capsule; Refill: 0    Fever, unspecified fever cause  -     POCT Influenza A/B    Other orders  -     ibuprofen (ADVIL,MOTRIN) 600 MG tablet; Take 1 tablet (600 mg total) by mouth every 8 (eight) hours as needed.  Dispense: 30 tablet; Refill: 0

## 2019-10-14 ENCOUNTER — OFFICE VISIT (OUTPATIENT)
Dept: INTERNAL MEDICINE | Facility: CLINIC | Age: 29
End: 2019-10-14
Payer: COMMERCIAL

## 2019-10-14 VITALS
HEART RATE: 72 BPM | DIASTOLIC BLOOD PRESSURE: 60 MMHG | OXYGEN SATURATION: 99 % | WEIGHT: 97.69 LBS | SYSTOLIC BLOOD PRESSURE: 119 MMHG | BODY MASS INDEX: 17.98 KG/M2 | HEIGHT: 62 IN

## 2019-10-14 DIAGNOSIS — F41.9 ANXIETY: ICD-10-CM

## 2019-10-14 DIAGNOSIS — R07.89 CHEST DISCOMFORT: ICD-10-CM

## 2019-10-14 DIAGNOSIS — N60.01 BILATERAL BREAST CYSTS: ICD-10-CM

## 2019-10-14 DIAGNOSIS — F31.9 BIPOLAR 1 DISORDER: ICD-10-CM

## 2019-10-14 DIAGNOSIS — R00.2 PALPITATIONS: Primary | ICD-10-CM

## 2019-10-14 DIAGNOSIS — N60.02 BILATERAL BREAST CYSTS: ICD-10-CM

## 2019-10-14 DIAGNOSIS — N63.10 LUMP OF BREAST, RIGHT: ICD-10-CM

## 2019-10-14 PROCEDURE — 99999 PR PBB SHADOW E&M-EST. PATIENT-LVL III: ICD-10-PCS | Mod: PBBFAC,,, | Performed by: FAMILY MEDICINE

## 2019-10-14 PROCEDURE — 3008F PR BODY MASS INDEX (BMI) DOCUMENTED: ICD-10-PCS | Mod: CPTII,S$GLB,, | Performed by: FAMILY MEDICINE

## 2019-10-14 PROCEDURE — 99215 OFFICE O/P EST HI 40 MIN: CPT | Mod: S$GLB,,, | Performed by: FAMILY MEDICINE

## 2019-10-14 PROCEDURE — 3008F BODY MASS INDEX DOCD: CPT | Mod: CPTII,S$GLB,, | Performed by: FAMILY MEDICINE

## 2019-10-14 PROCEDURE — 99215 PR OFFICE/OUTPT VISIT, EST, LEVL V, 40-54 MIN: ICD-10-PCS | Mod: S$GLB,,, | Performed by: FAMILY MEDICINE

## 2019-10-14 PROCEDURE — 99999 PR PBB SHADOW E&M-EST. PATIENT-LVL III: CPT | Mod: PBBFAC,,, | Performed by: FAMILY MEDICINE

## 2019-10-14 NOTE — PROGRESS NOTES
"Subjective:      Patient ID: Ratna Hanson is a 28 y.o. female.    Chief Complaint: Chest Pain; and Palpitations    HPI  Ratna Hanson is a 28 y.o. year old female with migraines, bipolar 1 disorder who presents today complaining of chest discomfort and palpitations.      Complains of irregular heartbeat for past couple weeks. Heart feels "strong", beats twice. Episodes sometimes last about a minute. Occurs at all times of the day.  Occurs at least twice daily.  Denies any associated symptoms, such as dizziness, fainting, shortness of breath, sweating.  She does not have a history of arrhythmia or family history of sudden cardiac death.    Also, feels tightness in her chest when she eats and tries to swallow. Feels like food or water gets stuck, fullness felling. Sometimes has pain in her chest.   Occurs daily. Sometimes sitting up makes it better. Sometimes worse when laying down.  She denies nausea, vomiting, burping.     Bipolar disorder-patient has appointment with Psychiatry in a couple months.  She has been compliant with Seroquel 25 mg daily. This is keeping her symptoms under control, although she might discuss increasing the dose with Psychiatry.   She denies any significant anxiety correctly.  She does take lorazepam 0.5 mg about twice a week on average.    Migraines-she used to take amitriptyline but that worsened her manic episodes. Currently takes Maxalt as needed and sees Neurology.    She also has a history of bilateral breast cysts.  She reports a new lump on her right breast that is painful.  Bilateral breast ultrasound 11/05/2018 - Multiple complicated and simple cysts in both breasts are benign-negative. Routine mammogram at age 39.     OB/GYN History   G*P2  LMP:   Sexually active:  Contraception:     Health Maintenance  Pap smear: 5/1/2017 - normal. Done at  (see record scanned into media section)  Mammogram: n/a  Colon Cancer Screening: n/a  DEXA: n/a  Hepatitis C screening: n/a  Flu vaccine: " "will do soon   Tetanus vaccine: UTD  PNA vaccine: n/a  Shingles vaccine: n/a    I personally reviewed Past Medical History, Past Surgical History, Social History, and Family History    Review of Systems   Constitutional: Negative for chills, fatigue, fever and unexpected weight change.   HENT: Negative for congestion, hearing loss, rhinorrhea and sore throat.    Eyes: Negative for visual disturbance.   Respiratory: Negative for shortness of breath and wheezing.         +occasional cough - improving    Cardiovascular: Positive for chest pain and palpitations. Negative for leg swelling.   Gastrointestinal: Negative for abdominal pain, constipation, diarrhea, nausea and vomiting.   Genitourinary: Negative for dysuria, frequency, menstrual problem and urgency.        +new right breast lump   Musculoskeletal: Negative for arthralgias and myalgias.   Skin: Negative for rash.   Neurological: Negative for dizziness, syncope and headaches.   Psychiatric/Behavioral: Negative for dysphoric mood and sleep disturbance. The patient is not nervous/anxious.        Objective:      Vitals:    10/14/19 0954   BP: 119/60   Pulse: 72   SpO2: 99%   Weight: 44.3 kg (97 lb 10.6 oz)   Height: 5' 2" (1.575 m)     Physical Exam   Constitutional: She is oriented to person, place, and time. She appears well-developed and well-nourished. No distress.   HENT:   Head: Normocephalic and atraumatic.   Eyes: Conjunctivae and EOM are normal.   Neck: Normal range of motion.   Cardiovascular: Normal rate, regular rhythm and normal heart sounds.   No murmur heard.  Pulmonary/Chest: Effort normal and breath sounds normal. No respiratory distress. She exhibits no tenderness.   Abdominal: Soft. Bowel sounds are normal. There is no tenderness.   Neurological: She is alert and oriented to person, place, and time.   Skin: Skin is warm and dry. No rash noted.   Psychiatric: She has a normal mood and affect. Her behavior is normal. Thought content normal. "   Nursing note and vitals reviewed.      Assessment:       1. Palpitations    2. Chest discomfort    3. Bilateral breast cysts    4. Lump of breast, right    5. Bipolar 1 disorder    6. Anxiety        Plan:     Palpitations  Likely benign, such as PVCs or PACs.  Holter ordered to rule out more serious arrhythmia.  -     Holter monitor - 48 hour; Future    Chest discomfort  Discussed the possibility GERD.  Recommended lifestyle changes.  Will consider 1 month PPI if not improved.    Bilateral breast cysts  -     US Breast Bilateral Complete; Future; Expected date: 10/14/2019    Lump of breast, right  -     US Breast Bilateral Complete; Future; Expected date: 10/14/2019    Bipolar 1 disorder  Continue Seroquel.  Follow up with Psychiatry    Anxiety  Continue lorazepam use sparingly.

## 2019-10-14 NOTE — PATIENT INSTRUCTIONS
"GERD (Adult)    The esophagus is a tube that carries food from the mouth to the stomach. A valve at the lower end of the esophagus prevents stomach acid from flowing upward. When this valve doesn't work properly, stomach contents may repeatedly flow back up (reflux) into the esophagus. This is called gastroesophageal reflux disease (GERD). GERD can irritate the esophagus. It can cause problems with swallowing or breathing. In severe cases, GERD can cause recurrent pneumonia or other serious problems.  Symptoms of reflux include burning, pressure or sharp pain in the upper abdomen or mid to lower chest. The pain can spread to the neck, back, or shoulder. There may be belching, an acid taste in the back of the throat, chronic cough, or sore throat or hoarseness. GERD symptoms often occur during the day after a big meal. They can also occur at night when lying down.   Home care  Lifestyle changes can help reduce symptoms. If needed, medicines may be prescribed. Symptoms often improve with treatment, but if treatment is stopped, the symptoms often return after a few months. So most persons with GERD will need to continue treatment.  Lifestyle changes  · Limit or avoid fatty, fried, and spicy foods, as well as coffee, chocolate, mint, and foods with high acid content such as tomatoes and citrus fruit and juices (orange, grapefruit, lemon).  · Dont eat large meals, especially at night. Frequent, smaller meals are best. Do not lie down right after eating. And dont eat anything 3 hours before going to bed.  · Avoid drinking alcohol and smoking. As much as possible, stay away from second hand smoke.  · If you are overweight, losing weight will reduce symptoms.   · Avoid wearing tight clothing around your stomach area.  · If your symptoms occur during sleep, use a foam wedge to elevate your upper body (not just your head.) Or, place 4" blocks under the head of your bed.  Medicines  If needed, medicines can help relieve the " symptoms of GERD and prevent damage to the esophagus. Discuss a medicine plan with your healthcare provider. This may include one or more of the following medicines:  · Antacids to help neutralize the normal acids in your stomach.  · Acid blockers (H2 blockers) to decrease acid production.  · Acid inhibitors (PPIs) to decrease acid production in a different way than the blockers. They may work better, but can take a little longer to take effect.  Take an antacid 30-60 minutes after eating and at bedtime, but not at the same time as an acid blocker.  Try not to take medicines such as ibuprofen and aspirin. If you are taking aspirin for your heart or other medical reasons, talk to your healthcare provider about stopping it.  Follow-up care  Follow up with your healthcare provider or as advised by our staff.  When to seek medical advice  Call your healthcare provider if any of the following occur:  · Stomach pain gets worse or moves to the lower right abdomen (appendix area)  · Chest pain appears or gets worse, or spreads to the back, neck, shoulder, or arm  · Frequent vomiting (cant keep down liquids)  · Blood in the stool or vomit (red or black in color)  · Feeling weak or dizzy  · Fever of 100.4ºF (38ºC) or higher, or as directed by your healthcare provider  Date Last Reviewed: 6/23/2015 © 2000-2017 [employer]. [address] [place] [address], [place] [address]. All rights reserved. This information is not intended as a substitute for professional medical care. Always follow your healthcare professional's instructions.        Lifestyle Changes for Controlling GERD  When you have GERD, stomach acid feels as if its backing up toward your mouth. Whether or not you take medicine to control your GERD, your symptoms can often be improved with lifestyle changes. Talk to your healthcare provider about the following suggestions. These suggestions may help you get relief from your symptoms.      Raise your  head  Reflux is more likely to strike when youre lying down flat, because stomach fluid can flow backward more easily. Raising the head of your bed 4 to 6 inches can help. To do this:  · Slide blocks or books under the legs at the head of your bed. Or, place a wedge under the mattress. Many foam stores can make a suitable wedge for you. The wedge should run from your waist to the top of your head.  · Dont just prop your head on several pillows. This increases pressure on your stomach. It can make GERD worse.  Watch your eating habits  Certain foods may increase the acid in your stomach or relax the lower esophageal sphincter. This makes GERD more likely. Its best to avoid the following if they cause you symptoms:  · Coffee, tea, and carbonated drinks (with and without caffeine)  · Fatty, fried, or spicy food  · Mint, chocolate, onions, and tomatoes  · Peppermint  · Any other foods that seem to irritate your stomach or cause you pain  Relieve the pressure  Tips include the following:  · Eat smaller meals, even if you have to eat more often.  · Dont lie down right after you eat. Wait a few hours for your stomach to empty.  · Avoid tight belts and tight-fitting clothes.  · Lose excess weight.  Tobacco and alcohol  Avoid smoking tobacco and drinking alcohol. They can make GERD symptoms worse.  Date Last Reviewed: 7/1/2016  © 4897-0786 [employer]. [address] [place] [address], [place] [address]. All rights reserved. This information is not intended as a substitute for professional medical care. Always follow your healthcare professional's instructions.

## 2019-10-16 DIAGNOSIS — F31.9 BIPOLAR 1 DISORDER: ICD-10-CM

## 2019-10-16 RX ORDER — QUETIAPINE FUMARATE 25 MG/1
TABLET, FILM COATED ORAL
Qty: 30 TABLET | Refills: 2 | Status: SHIPPED | OUTPATIENT
Start: 2019-10-16 | End: 2020-01-28

## 2019-10-24 ENCOUNTER — CLINICAL SUPPORT (OUTPATIENT)
Dept: CARDIOLOGY | Facility: HOSPITAL | Age: 29
End: 2019-10-24
Attending: FAMILY MEDICINE
Payer: COMMERCIAL

## 2019-10-24 DIAGNOSIS — R00.2 PALPITATIONS: ICD-10-CM

## 2019-10-24 PROCEDURE — 93225 XTRNL ECG REC<48 HRS REC: CPT

## 2019-10-24 PROCEDURE — 93227 XTRNL ECG REC<48 HR R&I: CPT | Mod: ,,, | Performed by: INTERNAL MEDICINE

## 2019-10-24 PROCEDURE — 93227 HOLTER MONITOR - 48 HOUR (CUPID ONLY): ICD-10-PCS | Mod: ,,, | Performed by: INTERNAL MEDICINE

## 2019-10-28 DIAGNOSIS — F41.9 ANXIETY: ICD-10-CM

## 2019-10-28 RX ORDER — LORAZEPAM 0.5 MG/1
0.5 TABLET ORAL DAILY PRN
Qty: 20 TABLET | Refills: 0 | Status: SHIPPED | OUTPATIENT
Start: 2019-10-28 | End: 2019-11-25 | Stop reason: SDUPTHER

## 2019-10-31 LAB
OHS CV EVENT MONITOR DAY: 0
OHS CV HOLTER LENGTH DECIMAL HOURS: 48
OHS CV HOLTER LENGTH HOURS: 48
OHS CV HOLTER LENGTH MINUTES: 0

## 2019-11-05 ENCOUNTER — PATIENT MESSAGE (OUTPATIENT)
Dept: INTERNAL MEDICINE | Facility: CLINIC | Age: 29
End: 2019-11-05

## 2019-11-25 DIAGNOSIS — F41.9 ANXIETY: ICD-10-CM

## 2019-11-25 RX ORDER — LORAZEPAM 0.5 MG/1
TABLET ORAL
Qty: 20 TABLET | Refills: 0 | Status: SHIPPED | OUTPATIENT
Start: 2019-11-25 | End: 2019-12-17 | Stop reason: SDUPTHER

## 2019-12-17 DIAGNOSIS — F41.9 ANXIETY: ICD-10-CM

## 2019-12-17 RX ORDER — LORAZEPAM 0.5 MG/1
TABLET ORAL
Qty: 20 TABLET | Refills: 0 | Status: SHIPPED | OUTPATIENT
Start: 2019-12-17 | End: 2020-04-20 | Stop reason: SDUPTHER

## 2019-12-27 ENCOUNTER — OFFICE VISIT (OUTPATIENT)
Dept: PSYCHIATRY | Facility: CLINIC | Age: 29
End: 2019-12-27
Payer: COMMERCIAL

## 2019-12-27 VITALS
DIASTOLIC BLOOD PRESSURE: 78 MMHG | HEART RATE: 75 BPM | WEIGHT: 96.56 LBS | HEIGHT: 62 IN | BODY MASS INDEX: 17.77 KG/M2 | SYSTOLIC BLOOD PRESSURE: 116 MMHG

## 2019-12-27 DIAGNOSIS — F31.9 BIPOLAR DEPRESSION: Primary | ICD-10-CM

## 2019-12-27 PROCEDURE — 3008F BODY MASS INDEX DOCD: CPT | Mod: CPTII,S$GLB,, | Performed by: STUDENT IN AN ORGANIZED HEALTH CARE EDUCATION/TRAINING PROGRAM

## 2019-12-27 PROCEDURE — 99203 PR OFFICE/OUTPT VISIT, NEW, LEVL III, 30-44 MIN: ICD-10-PCS | Mod: S$GLB,,, | Performed by: STUDENT IN AN ORGANIZED HEALTH CARE EDUCATION/TRAINING PROGRAM

## 2019-12-27 PROCEDURE — 99203 OFFICE O/P NEW LOW 30 MIN: CPT | Mod: S$GLB,,, | Performed by: STUDENT IN AN ORGANIZED HEALTH CARE EDUCATION/TRAINING PROGRAM

## 2019-12-27 PROCEDURE — 3008F PR BODY MASS INDEX (BMI) DOCUMENTED: ICD-10-PCS | Mod: CPTII,S$GLB,, | Performed by: STUDENT IN AN ORGANIZED HEALTH CARE EDUCATION/TRAINING PROGRAM

## 2019-12-27 PROCEDURE — 99999 PR PBB SHADOW E&M-EST. PATIENT-LVL II: ICD-10-PCS | Mod: PBBFAC,,, | Performed by: STUDENT IN AN ORGANIZED HEALTH CARE EDUCATION/TRAINING PROGRAM

## 2019-12-27 PROCEDURE — 99999 PR PBB SHADOW E&M-EST. PATIENT-LVL II: CPT | Mod: PBBFAC,,, | Performed by: STUDENT IN AN ORGANIZED HEALTH CARE EDUCATION/TRAINING PROGRAM

## 2019-12-27 RX ORDER — LAMOTRIGINE 25 MG/1
TABLET ORAL
Qty: 30 TABLET | Refills: 1 | Status: SHIPPED | OUTPATIENT
Start: 2019-12-27 | End: 2020-01-28 | Stop reason: SDUPTHER

## 2019-12-27 RX ORDER — ARIPIPRAZOLE 5 MG/1
5 TABLET ORAL DAILY
Qty: 30 TABLET | Refills: 1 | Status: SHIPPED | OUTPATIENT
Start: 2019-12-27 | End: 2020-01-28 | Stop reason: SDUPTHER
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
## 2020-01-01 ENCOUNTER — PATIENT MESSAGE (OUTPATIENT)
Dept: PSYCHIATRY | Facility: CLINIC | Age: 30
End: 2020-01-01

## 2020-01-15 NOTE — PROGRESS NOTES
Staff Note:      Pt was interviewed personally by me.   I agree with the Resident's findings.     Pt was receptive to a change to treatment centered on mood stabilization.  She was informed of risks and potential benefits.    CADENCE

## 2020-01-28 ENCOUNTER — OFFICE VISIT (OUTPATIENT)
Dept: PSYCHIATRY | Facility: CLINIC | Age: 30
End: 2020-01-28
Payer: COMMERCIAL

## 2020-01-28 VITALS
HEART RATE: 77 BPM | WEIGHT: 100.19 LBS | DIASTOLIC BLOOD PRESSURE: 84 MMHG | SYSTOLIC BLOOD PRESSURE: 117 MMHG | BODY MASS INDEX: 18.33 KG/M2

## 2020-01-28 DIAGNOSIS — F31.9 BIPOLAR DEPRESSION: Primary | ICD-10-CM

## 2020-01-28 PROCEDURE — 3008F PR BODY MASS INDEX (BMI) DOCUMENTED: ICD-10-PCS | Mod: CPTII,S$GLB,, | Performed by: STUDENT IN AN ORGANIZED HEALTH CARE EDUCATION/TRAINING PROGRAM

## 2020-01-28 PROCEDURE — 3008F BODY MASS INDEX DOCD: CPT | Mod: CPTII,S$GLB,, | Performed by: STUDENT IN AN ORGANIZED HEALTH CARE EDUCATION/TRAINING PROGRAM

## 2020-01-28 PROCEDURE — 99999 PR PBB SHADOW E&M-EST. PATIENT-LVL II: ICD-10-PCS | Mod: PBBFAC,,, | Performed by: STUDENT IN AN ORGANIZED HEALTH CARE EDUCATION/TRAINING PROGRAM

## 2020-01-28 PROCEDURE — 99213 PR OFFICE/OUTPT VISIT, EST, LEVL III, 20-29 MIN: ICD-10-PCS | Mod: S$GLB,,, | Performed by: STUDENT IN AN ORGANIZED HEALTH CARE EDUCATION/TRAINING PROGRAM

## 2020-01-28 PROCEDURE — 99213 OFFICE O/P EST LOW 20 MIN: CPT | Mod: S$GLB,,, | Performed by: STUDENT IN AN ORGANIZED HEALTH CARE EDUCATION/TRAINING PROGRAM

## 2020-01-28 PROCEDURE — 99999 PR PBB SHADOW E&M-EST. PATIENT-LVL II: CPT | Mod: PBBFAC,,, | Performed by: STUDENT IN AN ORGANIZED HEALTH CARE EDUCATION/TRAINING PROGRAM

## 2020-01-28 RX ORDER — ARIPIPRAZOLE 5 MG/1
5 TABLET ORAL DAILY
Qty: 30 TABLET | Refills: 2 | Status: SHIPPED | OUTPATIENT
Start: 2020-01-28 | End: 2020-06-02 | Stop reason: SDUPTHER

## 2020-01-28 RX ORDER — LAMOTRIGINE 100 MG/1
100 TABLET ORAL DAILY
Qty: 30 TABLET | Refills: 2 | Status: SHIPPED | OUTPATIENT
Start: 2020-01-28 | End: 2020-05-04 | Stop reason: SDUPTHER
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
## 2020-03-28 NOTE — HPI
"Ratna Hanson is a 27 y.o. [id number] at 25w3d presents complaining of a fall at 4:20pm on Saturday. Patient states she was walking outside when she started to feel "fuzzy" and dizzy; she admits to not having eaten in several hours. She braced herself on a table; however the table fell over and she fell onto her side. She denies hitting her abdomen or head and denies LOC. She has been having mild cramping which has been increasing but is still mild. She denies vaginal bleeding and LOF and reports good fetal movement.   This IUP is complicated by h/o postpartum depression.  " Alert/Cooperative/Confused/Awake

## 2020-04-13 ENCOUNTER — TELEPHONE (OUTPATIENT)
Dept: PSYCHIATRY | Facility: CLINIC | Age: 30
End: 2020-04-13

## 2020-04-13 NOTE — TELEPHONE ENCOUNTER
VNODA called pt to see if pt was interested in rescheduling appt with Dr. Rogers as either a video or phone visit. Pt did not answer. VONDA left  for pt to call back.

## 2020-04-20 DIAGNOSIS — F41.9 ANXIETY: ICD-10-CM

## 2020-04-20 RX ORDER — LORAZEPAM 0.5 MG/1
0.5 TABLET ORAL
Qty: 20 TABLET | Refills: 0 | Status: CANCELLED | OUTPATIENT
Start: 2020-04-20

## 2020-04-20 RX ORDER — LORAZEPAM 0.5 MG/1
TABLET ORAL
Qty: 20 TABLET | Refills: 0 | Status: SHIPPED | OUTPATIENT
Start: 2020-04-20 | End: 2020-06-05 | Stop reason: SDUPTHER

## 2020-04-20 NOTE — TELEPHONE ENCOUNTER
Please have her reach out to Dr. Gonzalez her psychiatrist for refills of Ativan, it looks like Dr. Wilson has not seen her since October. I will forward message.

## 2020-05-13 ENCOUNTER — LAB VISIT (OUTPATIENT)
Dept: PRIMARY CARE CLINIC | Facility: CLINIC | Age: 30
End: 2020-05-13
Payer: COMMERCIAL

## 2020-05-13 DIAGNOSIS — Z00.6 RESEARCH STUDY PATIENT: Primary | ICD-10-CM

## 2020-05-13 PROCEDURE — U0002 COVID-19 LAB TEST NON-CDC: HCPCS

## 2020-05-13 PROCEDURE — 86769 SARS-COV-2 COVID-19 ANTIBODY: CPT

## 2020-05-13 NOTE — RESEARCH
Date of Consent: 05/13/2020    Sponsor: [facility]    Study Title/IRB Number: Observational study of Sars-CoV2 Immunoglobulin G (IgG) seroprevalence among the [facility] population over time 2020.163  Principle Investigator: Nayeli Garcia, PhD    Did the patient need translation services? No   name: N/A    Prior to the Informed Consent (IC) being signed, or any study protocol required data collection, testing, procedure, or intervention being performed, the following was done and/or discussed:   Patient was given a paper copy of the IC for review    Patient was given study FAQ   Purpose of the study and qualifications to participate    Study design and tests or procedures done at this visit   Confidentiality and HIPAA Authorization for Release of Medical Records for the research trial/ subject's rights/research related injury   Risk, Benefits, Alternative Treatments, Compensation and Costs   Participation in the research trial is voluntary and patient may withdraw at anytime   Contact information for study related questions    Patient verbalizes understanding of the above: Yes  Contact information for PI and IRB given to patient: Yes  Patient able to adequately summarize: the purpose of the study, the risks associated with the study, and all procedures, testing, and follow-ups associated with the study: Yes    The consent was discussed verbally with the patient and all questions were answered satisfactorily. Patient gave verbal consent for the Seroprevalence research study with an IRB approval date of 05/08/2020.      The Consent, Consent Witness and name of Clinical Research Coordinator consenting was captured and documented in REDCap.    All Inclusion and Exclusion Criteria reviewed, subject meets all Inclusion criteria and does not meet any Exclusion Criteria at this time.     Patient Eligibility was confirmed.    Patient responded to survey questions.    The following biospecimen  collection procedures were collected:    -Nasopharyngeal Swab Collection  -Blood collection

## 2020-05-14 LAB — SARS-COV-2 IGG SERPLBLD QL IA.RAPID: NEGATIVE

## 2020-05-15 LAB — SARS-COV-2 RNA RESP QL NAA+PROBE: NOT DETECTED

## 2020-06-05 ENCOUNTER — OFFICE VISIT (OUTPATIENT)
Dept: PSYCHIATRY | Facility: CLINIC | Age: 30
End: 2020-06-05
Payer: COMMERCIAL

## 2020-06-05 DIAGNOSIS — F31.9 BIPOLAR DEPRESSION: ICD-10-CM

## 2020-06-05 DIAGNOSIS — F41.9 ANXIETY: Primary | ICD-10-CM

## 2020-06-05 DIAGNOSIS — F41.9 ANXIETY: ICD-10-CM

## 2020-06-05 PROCEDURE — 99213 PR OFFICE/OUTPT VISIT, EST, LEVL III, 20-29 MIN: ICD-10-PCS | Mod: 95,,, | Performed by: STUDENT IN AN ORGANIZED HEALTH CARE EDUCATION/TRAINING PROGRAM

## 2020-06-05 PROCEDURE — 99213 OFFICE O/P EST LOW 20 MIN: CPT | Mod: 95,,, | Performed by: STUDENT IN AN ORGANIZED HEALTH CARE EDUCATION/TRAINING PROGRAM

## 2020-06-05 RX ORDER — ARIPIPRAZOLE 5 MG/1
5 TABLET ORAL DAILY
Qty: 30 TABLET | Refills: 1 | Status: SHIPPED | OUTPATIENT
Start: 2020-06-30 | End: 2020-08-05 | Stop reason: SDUPTHER

## 2020-06-05 RX ORDER — LORAZEPAM 0.5 MG/1
TABLET ORAL
Qty: 30 TABLET | Refills: 0 | Status: SHIPPED | OUTPATIENT
Start: 2020-06-05 | End: 2020-09-30 | Stop reason: SDUPTHER
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
## 2020-08-05 RX ORDER — LAMOTRIGINE 100 MG/1
100 TABLET ORAL DAILY
Qty: 30 TABLET | Refills: 2 | Status: SHIPPED | OUTPATIENT
Start: 2020-08-05 | End: 2020-09-30

## 2020-08-05 RX ORDER — ARIPIPRAZOLE 5 MG/1
5 TABLET ORAL DAILY
Qty: 30 TABLET | Refills: 1 | Status: SHIPPED | OUTPATIENT
Start: 2020-08-05 | End: 2020-09-03

## 2020-09-30 ENCOUNTER — OFFICE VISIT (OUTPATIENT)
Dept: PSYCHIATRY | Facility: CLINIC | Age: 30
End: 2020-09-30
Payer: COMMERCIAL

## 2020-09-30 VITALS
DIASTOLIC BLOOD PRESSURE: 67 MMHG | WEIGHT: 106.25 LBS | HEIGHT: 61 IN | BODY MASS INDEX: 20.06 KG/M2 | SYSTOLIC BLOOD PRESSURE: 119 MMHG | HEART RATE: 89 BPM

## 2020-09-30 DIAGNOSIS — F31.9 BIPOLAR DEPRESSION: Primary | ICD-10-CM

## 2020-09-30 DIAGNOSIS — F41.9 ANXIETY: ICD-10-CM

## 2020-09-30 PROCEDURE — 99999 PR PBB SHADOW E&M-EST. PATIENT-LVL III: ICD-10-PCS | Mod: PBBFAC,,, | Performed by: GENERAL PRACTICE

## 2020-09-30 PROCEDURE — 99999 PR PBB SHADOW E&M-EST. PATIENT-LVL III: CPT | Mod: PBBFAC,,, | Performed by: GENERAL PRACTICE

## 2020-09-30 PROCEDURE — 99213 OFFICE O/P EST LOW 20 MIN: CPT | Mod: S$PBB,,, | Performed by: GENERAL PRACTICE

## 2020-09-30 PROCEDURE — 99213 PR OFFICE/OUTPT VISIT, EST, LEVL III, 20-29 MIN: ICD-10-PCS | Mod: S$PBB,,, | Performed by: GENERAL PRACTICE

## 2020-09-30 RX ORDER — LAMOTRIGINE 150 MG/1
150 TABLET ORAL DAILY
Qty: 30 TABLET | Refills: 1 | Status: SHIPPED | OUTPATIENT
Start: 2020-09-30 | End: 2020-12-28 | Stop reason: SDUPTHER

## 2020-09-30 RX ORDER — HYDROXYZINE PAMOATE 25 MG/1
25 CAPSULE ORAL EVERY 6 HOURS PRN
Qty: 48 CAPSULE | Refills: 1 | Status: SHIPPED | OUTPATIENT
Start: 2020-09-30 | End: 2021-06-23 | Stop reason: SDUPTHER

## 2020-09-30 RX ORDER — ARIPIPRAZOLE 5 MG/1
5 TABLET ORAL DAILY
Qty: 30 TABLET | Refills: 1 | Status: SHIPPED | OUTPATIENT
Start: 2020-09-30 | End: 2020-10-21 | Stop reason: SDUPTHER

## 2020-09-30 RX ORDER — LORAZEPAM 0.5 MG/1
TABLET ORAL
Qty: 30 TABLET | Refills: 0 | Status: SHIPPED | OUTPATIENT
Start: 2020-09-30 | End: 2021-04-16 | Stop reason: SDUPTHER
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
## 2020-10-07 ENCOUNTER — PATIENT MESSAGE (OUTPATIENT)
Dept: PSYCHIATRY | Facility: CLINIC | Age: 30
End: 2020-10-07

## 2020-10-20 ENCOUNTER — PATIENT MESSAGE (OUTPATIENT)
Dept: PSYCHIATRY | Facility: CLINIC | Age: 30
End: 2020-10-20

## 2020-10-21 DIAGNOSIS — F31.9 BIPOLAR DEPRESSION: ICD-10-CM

## 2020-10-21 RX ORDER — ARIPIPRAZOLE 5 MG/1
5 TABLET ORAL DAILY
Qty: 30 TABLET | Refills: 1 | Status: SHIPPED | OUTPATIENT
Start: 2020-10-21 | End: 2020-12-28 | Stop reason: SDUPTHER

## 2020-12-30 ENCOUNTER — PATIENT MESSAGE (OUTPATIENT)
Dept: PSYCHIATRY | Facility: CLINIC | Age: 30
End: 2020-12-30

## 2021-01-01 NOTE — ASSESSMENT & PLAN NOTE
Offered augmentation but pt prefers expectant mgmt at this time.    Fransisca Kelly  (RN)  2021 15:21:24

## 2021-01-19 ENCOUNTER — PATIENT MESSAGE (OUTPATIENT)
Dept: OBSTETRICS AND GYNECOLOGY | Facility: CLINIC | Age: 31
End: 2021-01-19

## 2021-02-02 ENCOUNTER — PATIENT OUTREACH (OUTPATIENT)
Dept: ADMINISTRATIVE | Facility: OTHER | Age: 31
End: 2021-02-02

## 2021-02-03 ENCOUNTER — OFFICE VISIT (OUTPATIENT)
Dept: OBSTETRICS AND GYNECOLOGY | Facility: CLINIC | Age: 31
End: 2021-02-03
Payer: COMMERCIAL

## 2021-02-03 ENCOUNTER — LAB VISIT (OUTPATIENT)
Dept: LAB | Facility: OTHER | Age: 31
End: 2021-02-03
Attending: STUDENT IN AN ORGANIZED HEALTH CARE EDUCATION/TRAINING PROGRAM
Payer: COMMERCIAL

## 2021-02-03 VITALS
DIASTOLIC BLOOD PRESSURE: 70 MMHG | WEIGHT: 110.44 LBS | BODY MASS INDEX: 20.85 KG/M2 | HEIGHT: 61 IN | SYSTOLIC BLOOD PRESSURE: 110 MMHG

## 2021-02-03 DIAGNOSIS — Z01.419 WELL WOMAN EXAM WITH ROUTINE GYNECOLOGICAL EXAM: Primary | ICD-10-CM

## 2021-02-03 DIAGNOSIS — N92.6 IRREGULAR PERIODS: ICD-10-CM

## 2021-02-03 LAB — TSH SERPL DL<=0.005 MIU/L-ACNC: 1.31 UIU/ML (ref 0.4–4)

## 2021-02-03 PROCEDURE — 3008F PR BODY MASS INDEX (BMI) DOCUMENTED: ICD-10-PCS | Mod: CPTII,S$GLB,, | Performed by: STUDENT IN AN ORGANIZED HEALTH CARE EDUCATION/TRAINING PROGRAM

## 2021-02-03 PROCEDURE — 82670 ASSAY OF TOTAL ESTRADIOL: CPT

## 2021-02-03 PROCEDURE — 99395 PREV VISIT EST AGE 18-39: CPT | Mod: S$GLB,,, | Performed by: STUDENT IN AN ORGANIZED HEALTH CARE EDUCATION/TRAINING PROGRAM

## 2021-02-03 PROCEDURE — 87624 HPV HI-RISK TYP POOLED RSLT: CPT

## 2021-02-03 PROCEDURE — 99999 PR PBB SHADOW E&M-EST. PATIENT-LVL III: ICD-10-PCS | Mod: PBBFAC,,, | Performed by: STUDENT IN AN ORGANIZED HEALTH CARE EDUCATION/TRAINING PROGRAM

## 2021-02-03 PROCEDURE — 3008F BODY MASS INDEX DOCD: CPT | Mod: CPTII,S$GLB,, | Performed by: STUDENT IN AN ORGANIZED HEALTH CARE EDUCATION/TRAINING PROGRAM

## 2021-02-03 PROCEDURE — 99999 PR PBB SHADOW E&M-EST. PATIENT-LVL III: CPT | Mod: PBBFAC,,, | Performed by: STUDENT IN AN ORGANIZED HEALTH CARE EDUCATION/TRAINING PROGRAM

## 2021-02-03 PROCEDURE — 84146 ASSAY OF PROLACTIN: CPT

## 2021-02-03 PROCEDURE — 1126F PR PAIN SEVERITY QUANTIFIED, NO PAIN PRESENT: ICD-10-PCS | Mod: S$GLB,,, | Performed by: STUDENT IN AN ORGANIZED HEALTH CARE EDUCATION/TRAINING PROGRAM

## 2021-02-03 PROCEDURE — 99395 PR PREVENTIVE VISIT,EST,18-39: ICD-10-PCS | Mod: S$GLB,,, | Performed by: STUDENT IN AN ORGANIZED HEALTH CARE EDUCATION/TRAINING PROGRAM

## 2021-02-03 PROCEDURE — 84443 ASSAY THYROID STIM HORMONE: CPT

## 2021-02-03 PROCEDURE — 83001 ASSAY OF GONADOTROPIN (FSH): CPT

## 2021-02-03 PROCEDURE — 88175 CYTOPATH C/V AUTO FLUID REDO: CPT

## 2021-02-03 PROCEDURE — 1126F AMNT PAIN NOTED NONE PRSNT: CPT | Mod: S$GLB,,, | Performed by: STUDENT IN AN ORGANIZED HEALTH CARE EDUCATION/TRAINING PROGRAM

## 2021-02-03 PROCEDURE — 36415 COLL VENOUS BLD VENIPUNCTURE: CPT

## 2021-02-04 LAB
ESTRADIOL SERPL-MCNC: 285 PG/ML
FSH SERPL-ACNC: 5.4 MIU/ML
PROLACTIN SERPL IA-MCNC: 14.4 NG/ML (ref 5.2–26.5)

## 2021-02-10 LAB
HPV HR 12 DNA SPEC QL NAA+PROBE: NEGATIVE
HPV16 AG SPEC QL: NEGATIVE
HPV18 DNA SPEC QL NAA+PROBE: NEGATIVE

## 2021-03-02 LAB
FINAL PATHOLOGIC DIAGNOSIS: NORMAL
Lab: NORMAL

## 2021-04-16 ENCOUNTER — PATIENT MESSAGE (OUTPATIENT)
Dept: PSYCHIATRY | Facility: CLINIC | Age: 31
End: 2021-04-16

## 2021-04-16 ENCOUNTER — PATIENT MESSAGE (OUTPATIENT)
Dept: RESEARCH | Facility: HOSPITAL | Age: 31
End: 2021-04-16

## 2021-04-28 ENCOUNTER — PATIENT MESSAGE (OUTPATIENT)
Dept: PSYCHIATRY | Facility: CLINIC | Age: 31
End: 2021-04-28

## 2021-04-28 DIAGNOSIS — F31.9 BIPOLAR DEPRESSION: ICD-10-CM

## 2021-06-03 ENCOUNTER — PATIENT OUTREACH (OUTPATIENT)
Dept: ADMINISTRATIVE | Facility: HOSPITAL | Age: 31
End: 2021-06-03

## 2021-06-23 ENCOUNTER — OFFICE VISIT (OUTPATIENT)
Dept: PSYCHIATRY | Facility: CLINIC | Age: 31
End: 2021-06-23
Payer: COMMERCIAL

## 2021-06-23 DIAGNOSIS — F41.9 ANXIETY: ICD-10-CM

## 2021-06-23 DIAGNOSIS — F31.9 BIPOLAR DEPRESSION: ICD-10-CM

## 2021-06-23 PROCEDURE — 99214 OFFICE O/P EST MOD 30 MIN: CPT | Mod: 95,,, | Performed by: GENERAL PRACTICE

## 2021-06-23 PROCEDURE — 99214 PR OFFICE/OUTPT VISIT, EST, LEVL IV, 30-39 MIN: ICD-10-PCS | Mod: 95,,, | Performed by: GENERAL PRACTICE

## 2021-06-23 RX ORDER — HYDROXYZINE PAMOATE 25 MG/1
25 CAPSULE ORAL EVERY 6 HOURS PRN
Qty: 48 CAPSULE | Refills: 2 | Status: SHIPPED | OUTPATIENT
Start: 2021-06-23 | End: 2022-02-23

## 2021-06-23 RX ORDER — ARIPIPRAZOLE 5 MG/1
5 TABLET ORAL DAILY
Qty: 30 TABLET | Refills: 2 | Status: SHIPPED | OUTPATIENT
Start: 2021-06-23 | End: 2021-08-23 | Stop reason: SDUPTHER

## 2021-06-23 RX ORDER — LORAZEPAM 0.5 MG/1
TABLET ORAL
Qty: 30 TABLET | Refills: 2 | Status: SHIPPED | OUTPATIENT
Start: 2021-06-23 | End: 2022-01-19 | Stop reason: SDUPTHER

## 2021-06-23 RX ORDER — LAMOTRIGINE 150 MG/1
150 TABLET ORAL DAILY
Qty: 30 TABLET | Refills: 2 | Status: SHIPPED | OUTPATIENT
Start: 2021-06-23 | End: 2021-08-23 | Stop reason: SDUPTHER

## 2021-12-15 DIAGNOSIS — F31.9 BIPOLAR DEPRESSION: ICD-10-CM

## 2021-12-15 RX ORDER — ARIPIPRAZOLE 5 MG/1
TABLET ORAL
Qty: 90 TABLET | Refills: 0 | Status: SHIPPED | OUTPATIENT
Start: 2021-12-15 | End: 2022-01-19 | Stop reason: SDUPTHER

## 2022-01-05 ENCOUNTER — PATIENT MESSAGE (OUTPATIENT)
Dept: PSYCHIATRY | Facility: CLINIC | Age: 32
End: 2022-01-05
Payer: COMMERCIAL

## 2022-01-05 DIAGNOSIS — F31.9 BIPOLAR DEPRESSION: ICD-10-CM

## 2022-01-05 RX ORDER — LAMOTRIGINE 150 MG/1
150 TABLET ORAL DAILY
Qty: 21 TABLET | Refills: 0 | Status: SHIPPED | OUTPATIENT
Start: 2022-01-05 | End: 2022-01-07

## 2022-01-07 RX ORDER — LAMOTRIGINE 25 MG/1
25 TABLET ORAL DAILY
Qty: 14 TABLET | Refills: 0 | Status: SHIPPED | OUTPATIENT
Start: 2022-01-07 | End: 2022-01-19 | Stop reason: SDUPTHER

## 2022-01-19 ENCOUNTER — OFFICE VISIT (OUTPATIENT)
Dept: PSYCHIATRY | Facility: CLINIC | Age: 32
End: 2022-01-19
Payer: COMMERCIAL

## 2022-01-19 VITALS
WEIGHT: 122.13 LBS | HEART RATE: 74 BPM | BODY MASS INDEX: 23.08 KG/M2 | DIASTOLIC BLOOD PRESSURE: 72 MMHG | SYSTOLIC BLOOD PRESSURE: 130 MMHG

## 2022-01-19 DIAGNOSIS — F31.9 BIPOLAR DEPRESSION: Primary | ICD-10-CM

## 2022-01-19 DIAGNOSIS — F41.9 ANXIETY: ICD-10-CM

## 2022-01-19 PROCEDURE — 99999 PR PBB SHADOW E&M-EST. PATIENT-LVL II: CPT | Mod: PBBFAC,,, | Performed by: STUDENT IN AN ORGANIZED HEALTH CARE EDUCATION/TRAINING PROGRAM

## 2022-01-19 PROCEDURE — 99999 PR PBB SHADOW E&M-EST. PATIENT-LVL II: ICD-10-PCS | Mod: PBBFAC,,, | Performed by: STUDENT IN AN ORGANIZED HEALTH CARE EDUCATION/TRAINING PROGRAM

## 2022-01-19 PROCEDURE — 99214 OFFICE O/P EST MOD 30 MIN: CPT | Mod: S$GLB,,, | Performed by: STUDENT IN AN ORGANIZED HEALTH CARE EDUCATION/TRAINING PROGRAM

## 2022-01-19 PROCEDURE — 99214 PR OFFICE/OUTPT VISIT, EST, LEVL IV, 30-39 MIN: ICD-10-PCS | Mod: S$GLB,,, | Performed by: STUDENT IN AN ORGANIZED HEALTH CARE EDUCATION/TRAINING PROGRAM

## 2022-01-19 RX ORDER — LAMOTRIGINE 100 MG/1
TABLET ORAL
Qty: 14 TABLET | Refills: 0 | Status: SHIPPED | OUTPATIENT
Start: 2022-01-19 | End: 2022-02-09

## 2022-01-19 RX ORDER — LORAZEPAM 0.5 MG/1
TABLET ORAL
Qty: 30 TABLET | Refills: 2 | Status: SHIPPED | OUTPATIENT
Start: 2022-01-19 | End: 2022-02-23 | Stop reason: SDUPTHER

## 2022-01-19 RX ORDER — LAMOTRIGINE 150 MG/1
150 TABLET ORAL DAILY
Qty: 30 TABLET | Refills: 1 | Status: SHIPPED | OUTPATIENT
Start: 2022-02-09 | End: 2022-02-23 | Stop reason: SDUPTHER

## 2022-01-19 RX ORDER — ARIPIPRAZOLE 5 MG/1
5 TABLET ORAL DAILY
Qty: 90 TABLET | Refills: 0 | Status: SHIPPED | OUTPATIENT
Start: 2022-01-19 | End: 2022-02-23 | Stop reason: SDUPTHER

## 2022-01-19 NOTE — PROGRESS NOTES
Established Outpatient Psychiatry Visit    Chief Complaint/Reason for Encounter:  Ratna Hanson, a 31 y.o. female, presenting for follow up visit    Current psychotropic medications:  Lamotrigine 25mg daily (since 1/5)  Abilify 5mg daily  Ativan 0.5mg daily prn  Vistaril 25mg TID prn      Subjective     History of Present Illness/Psychiatric ROS:   Patient was last seen by Dr. Bustillo 6/23/21. Patient states she has been doing well for several months until she recently ran out of her lamotrigine for about a week in late December/early January. I was able to start her back on lamotrigine on 1/5 at a reduced dose of 25mg. While she was off her medication she noticed some symptoms of rahel spending more money, increased goal directed activity such as excessive cleaning and working out, decreased need for sleep, and irritability. Since she has been back on the lamotrigine she notices these things have improved and she is feeling more herself. She is still taking her Abilify and did not run out of that. She reports that while she was off the lamotrigine her anxiety worsened. She noticed she was taking Ativan more to calm down, sometimes daily (previously only used once a week or so). States he anxiety is improving again since restarting lamictal. Mood is currently good. Appetite good. Sleeping well. Some fatigue during the day. No SI/HI/AVH. Would like to get back to her normal lamotrigine dose.    No further questions, complaints, or concerns.    Medication compliance: ran out of lamotrigine for 1 week  Medication adverse effects: none  Primary Care: reports no changes in medications or medical conditions since last appointment      Pertinent positives per HPI; otherwise negative for changes in mood, sleep, appetite, energy, concentration, SI/SA, anxiety, irritability, impulsive behaviors, pressured speech, delusions, or AVH.    Past Medical, Family, Psychiatric, and Social History:   The patient's past medical,  family and social history have been reviewed and updated as appropriate - see prior encounter notes.    Objective     Constitutional  Vitals:  Most recent vital signs, dated less than 90 days prior to this appointment, were reviewed.    Vitals:    01/19/22 1247   BP: 130/72   Pulse: 74   Weight: 55.4 kg (122 lb 2.2 oz)      General:  unremarkable, age appropriate     Neurologic  Psychomotor signs:  No involuntary movements or tremor noted   Gait:  Within normal limits   AIMS: N/A     Psychiatric  Behavior/Cooperation normal, cooperative   Orientation: grossly intact   Level of Consciousness: awake, alert   Language: grossly intact   Speech:  within normal limits; no latency or pressure   Mood:   Affect:  euthymic  congruent and appropriate   Thought Process:  normal and logical   Associations:  intact   Thought Content:  appropriate   Memory: intact for content of interview, grossly intact   Attention /Concentration:  able to focus, not distracted   Fund of Knowledge:  intact and appropriate to age and level of education   Insight:    Judgement:    intact, has awareness of illness   behavior is adequate to circumstances        ALL MEDICATIONS:  Outpatient Encounter Medications as of 1/19/2022   Medication Sig Dispense Refill    ARIPiprazole (ABILIFY) 5 MG Tab Take 1 tablet (5 mg total) by mouth once daily. 90 tablet 0    hydrOXYzine pamoate (VISTARIL) 25 MG Cap Take 1 capsule (25 mg total) by mouth every 6 (six) hours as needed (breakthrough anxiety). 48 capsule 2    lamoTRIgine (LAMICTAL) 100 MG tablet Take 0.5 tablets (50 mg total) by mouth once daily for 14 days, THEN 1 tablet (100 mg total) once daily for 7 days. 14 tablet 0    [START ON 2/9/2022] lamoTRIgine (LAMICTAL) 150 MG Tab Take 1 tablet (150 mg total) by mouth once daily. 30 tablet 1    LORazepam (ATIVAN) 0.5 MG tablet TAKE 1 TABLET(0.5 MG) BY MOUTH DAILY AS NEEDED FOR ANXIETY 30 tablet 2    multivitamin capsule Take 1 capsule by mouth once daily.       [DISCONTINUED] ARIPiprazole (ABILIFY) 5 MG Tab TAKE 1 TABLET(5 MG) BY MOUTH EVERY DAY 90 tablet 0    [DISCONTINUED] lamoTRIgine (LAMICTAL) 25 MG tablet Take 1 tablet (25 mg total) by mouth once daily. for 14 days 14 tablet 0    [DISCONTINUED] LORazepam (ATIVAN) 0.5 MG tablet TAKE 1 TABLET(0.5 MG) BY MOUTH DAILY AS NEEDED FOR ANXIETY 30 tablet 2     No facility-administered encounter medications on file as of 1/19/2022.       ALLERGIES:  Review of patient's allergies indicates:  No Known Allergies    Assessment and Plan:   Status/Progress: Based on the examination today, the patient's problem(s) is/are adequately but not ideally controlled.  New problems have not been presented today.   Co-morbidities, Diagnostic uncertainty and Lack of compliance are not complicating management of the primary condition.  There are no active rule-out diagnoses for this patient at this time.     Impression:     ICD-10-CM ICD-9-CM   1. Bipolar depression  F31.9 296.50   2. Anxiety  F41.9 300.00       Risk Parameters:  No suicide or homicide risk  No objective signs of rahel, psychosis, or affective instability to the point of suicidality or homicidality.    Treatment Plan:   Medication Management:     - Patient had run out of lamotrigine for a week (was previously stable on 150mg daily) - she was started on 25mg daily x 2 weeks on 1/5      - Continue Lamotrigine ramp - start Lamotrigine 50mg daily x 2 weeks, then 100mg daily x 1 week, then 150mg daily     - Continue Abilify 5mg daily for mood     - Continue Ativan 0.5mg daily prn for anxiety     - Discontinue Vistaril 25mg TID prn (pt states it made her feel strange like her HR was low)  Psychotherapy: none  Labs/Vitals: reviewed most recent  Primary Care: [facility]  : [place] Prescription Monitoring Program was reviewed with no evidence of inconsistencies either in the patient's account or healthcare provider continuity.    Return to Clinic: 1 month    Education/counseling  regarding diagnosis, medications, risks/benefits/side effects, and overall treatment plan reviewed with patient. Any questions/concerns addressed and patient consents to current plan.    Take medications as prescribed and abstain for substance abuse.  Keep future appointments.  See PCP for any new or chronic medical conditions.  Present to ED or call 911 for SI/HI with intent and/or plan, acute change in mental status, or medical emergency.    Case to be discussed with supervising staff, Dr. Eldon Swan MD.    Laury He MD  [facility], PGY-3

## 2022-02-23 ENCOUNTER — OFFICE VISIT (OUTPATIENT)
Dept: PSYCHIATRY | Facility: CLINIC | Age: 32
End: 2022-02-23
Payer: COMMERCIAL

## 2022-02-23 DIAGNOSIS — F31.9 BIPOLAR DEPRESSION: ICD-10-CM

## 2022-02-23 DIAGNOSIS — F31.81 BIPOLAR II DISORDER: ICD-10-CM

## 2022-02-23 DIAGNOSIS — F41.9 ANXIETY: Primary | ICD-10-CM

## 2022-02-23 PROCEDURE — 99214 OFFICE O/P EST MOD 30 MIN: CPT | Mod: 95,,, | Performed by: STUDENT IN AN ORGANIZED HEALTH CARE EDUCATION/TRAINING PROGRAM

## 2022-02-23 PROCEDURE — 99214 PR OFFICE/OUTPT VISIT, EST, LEVL IV, 30-39 MIN: ICD-10-PCS | Mod: 95,,, | Performed by: STUDENT IN AN ORGANIZED HEALTH CARE EDUCATION/TRAINING PROGRAM

## 2022-02-23 RX ORDER — ARIPIPRAZOLE 5 MG/1
5 TABLET ORAL DAILY
Qty: 30 TABLET | Refills: 5 | Status: SHIPPED | OUTPATIENT
Start: 2022-02-23 | End: 2022-09-29 | Stop reason: SDUPTHER

## 2022-02-23 RX ORDER — LAMOTRIGINE 200 MG/1
200 TABLET ORAL DAILY
Qty: 30 TABLET | Refills: 5 | Status: SHIPPED | OUTPATIENT
Start: 2022-02-23 | End: 2022-09-29 | Stop reason: SDUPTHER

## 2022-02-23 RX ORDER — LORAZEPAM 0.5 MG/1
TABLET ORAL
Qty: 30 TABLET | Refills: 2 | Status: SHIPPED | OUTPATIENT
Start: 2022-02-23 | End: 2022-09-29 | Stop reason: SDUPTHER

## 2022-02-23 NOTE — PROGRESS NOTES
Established Outpatient Psychiatry Visit    Chief Complaint/Reason for Encounter:  Ratna Hanson, a 31 y.o. female, presenting for follow up visit    Current psychotropic medications:  Lamotrigine 150mg daily daily  Abilify 5mg daily  Ativan 0.5mg daily prn    TELE PSYCHIATRY Disclaimer   *The patient was informed despite using HIPPA compliant technology there may be risks including security breach, technological failure, inability to perform a comprehensive physical exam which could delay or prevent an accurate diagnosis, and potential complications from treatment decisions rendered over a telemedical platform. The patient understands and consented to the use of tele-health service as being a safe measure to mitigate during COVID 19 Pandemic .  The patient was also informed of the relationship between the physician and patient and the respective role of any other health care provider with respect to management of the patient; and notified that the pt may decline to receive medical services by telemedicine and may withdraw from such care at any time.     Subjective     History of Present Illness/Psychiatric ROS:   Doing a lot better now she is back on her lamotrigine. Still feels like she is having some manic symptoms - irritability, some difficulty falling asleep, some impulsivity (spending more money), feels like mind is racing. No risky behaviors, no flight of ideas, psychomotor agitation. No rapid or pressured speech on interview. Works as . Mild increase in appetite. Anxiety doing better since last visit - uses Ativan 0-2x per week. No low mood or anhedonia. No SI/HI/AVH. Interested in increasing lamotrigine.  No further questions, complaints, or concerns.    Medication compliance: yes  Medication adverse effects: none  Primary Care: reports no changes in medications or medical conditions since last appointment     Past Medical, Family, Psychiatric, and Social History:   The patient's past  medical, family and social history have been reviewed and updated as appropriate - see prior encounter notes.    Objective     Constitutional  Vitals:  Most recent vital signs, dated less than 90 days prior to this appointment, were reviewed.    There were no vitals filed for this visit.   General:  unremarkable, age appropriate     Neurologic  Psychomotor signs:  No involuntary movements or tremor noted   Gait:  Within normal limits   AIMS: N/A     Psychiatric  Behavior/Cooperation normal, cooperative   Orientation: grossly intact   Level of Consciousness: awake, alert   Language: grossly intact   Speech:  within normal limits; no latency or pressure   Mood:   Affect:  euthymic  congruent and appropriate   Thought Process:  normal and logical   Associations:  intact   Thought Content:  appropriate   Memory: intact for content of interview, grossly intact   Attention /Concentration:  able to focus, not distracted   Fund of Knowledge:  intact and appropriate to age and level of education   Insight:    Judgement:    intact, has awareness of illness   behavior is adequate to circumstances        ALL MEDICATIONS:  Outpatient Encounter Medications as of 2/23/2022   Medication Sig Dispense Refill    ARIPiprazole (ABILIFY) 5 MG Tab Take 1 tablet (5 mg total) by mouth once daily. 90 tablet 0    hydrOXYzine pamoate (VISTARIL) 25 MG Cap Take 1 capsule (25 mg total) by mouth every 6 (six) hours as needed (breakthrough anxiety). 48 capsule 2    lamoTRIgine (LAMICTAL) 150 MG Tab Take 1 tablet (150 mg total) by mouth once daily. 30 tablet 1    LORazepam (ATIVAN) 0.5 MG tablet TAKE 1 TABLET(0.5 MG) BY MOUTH DAILY AS NEEDED FOR ANXIETY 30 tablet 2    multivitamin capsule Take 1 capsule by mouth once daily.       No facility-administered encounter medications on file as of 2/23/2022.       ALLERGIES:  Review of patient's allergies indicates:  No Known Allergies    Assessment and Plan:   Status/Progress: Based on the examination  today, the patient's problem(s) is/are adequately but not ideally controlled.  New problems have not been presented today.   Co-morbidities, Diagnostic uncertainty and Lack of compliance are not complicating management of the primary condition.  There are no active rule-out diagnoses for this patient at this time.     Impression:     ICD-10-CM ICD-9-CM   1. Anxiety  F41.9 300.00   2. Bipolar II disorder  F31.81 296.89       Risk Parameters:  No suicide or homicide risk  No objective signs of rahel, psychosis, or affective instability to the point of suicidality or homicidality.    Treatment Plan:   Medication Management:     - Increase Lamictal to 200mg (from 150mg) daily for mood     - Continue Abilify 5mg daily for mood     - Continue Ativan 0.5mg daily prn for anxiety  Psychotherapy: none  Labs/Vitals: reviewed most recent - ordered lipid panel, A1c, CMP  Primary Care: [facility]  : [place] Prescription Monitoring Program was reviewed with no evidence of inconsistencies either in the patient's account or healthcare provider continuity.    Return to Clinic: 2-3 months    Education/counseling regarding diagnosis, medications, risks/benefits/side effects, and overall treatment plan reviewed with patient. Any questions/concerns addressed and patient consents to current plan.    Take medications as prescribed and abstain for substance abuse.  Keep future appointments.  See PCP for any new or chronic medical conditions.  Present to ED or call 911 for SI/HI with intent and/or plan, acute change in mental status, or medical emergency.    Case to be discussed with supervising staff, Dr. Eldon Swan MD.    Laury He MD  [facility], PGY-3

## 2022-09-29 ENCOUNTER — OFFICE VISIT (OUTPATIENT)
Dept: PSYCHIATRY | Facility: CLINIC | Age: 32
End: 2022-09-29
Payer: COMMERCIAL

## 2022-09-29 VITALS
BODY MASS INDEX: 22.24 KG/M2 | WEIGHT: 117.75 LBS | HEART RATE: 84 BPM | DIASTOLIC BLOOD PRESSURE: 86 MMHG | SYSTOLIC BLOOD PRESSURE: 139 MMHG

## 2022-09-29 DIAGNOSIS — F31.81 BIPOLAR 2 DISORDER: ICD-10-CM

## 2022-09-29 DIAGNOSIS — F41.1 GAD (GENERALIZED ANXIETY DISORDER): ICD-10-CM

## 2022-09-29 PROCEDURE — 3075F SYST BP GE 130 - 139MM HG: CPT | Mod: CPTII,S$GLB,, | Performed by: STUDENT IN AN ORGANIZED HEALTH CARE EDUCATION/TRAINING PROGRAM

## 2022-09-29 PROCEDURE — 99999 PR PBB SHADOW E&M-EST. PATIENT-LVL II: CPT | Mod: PBBFAC,,, | Performed by: STUDENT IN AN ORGANIZED HEALTH CARE EDUCATION/TRAINING PROGRAM

## 2022-09-29 PROCEDURE — 99999 PR PBB SHADOW E&M-EST. PATIENT-LVL II: ICD-10-PCS | Mod: PBBFAC,,, | Performed by: STUDENT IN AN ORGANIZED HEALTH CARE EDUCATION/TRAINING PROGRAM

## 2022-09-29 PROCEDURE — 3079F PR MOST RECENT DIASTOLIC BLOOD PRESSURE 80-89 MM HG: ICD-10-PCS | Mod: CPTII,S$GLB,, | Performed by: STUDENT IN AN ORGANIZED HEALTH CARE EDUCATION/TRAINING PROGRAM

## 2022-09-29 PROCEDURE — 99214 PR OFFICE/OUTPT VISIT, EST, LEVL IV, 30-39 MIN: ICD-10-PCS | Mod: S$GLB,,, | Performed by: STUDENT IN AN ORGANIZED HEALTH CARE EDUCATION/TRAINING PROGRAM

## 2022-09-29 PROCEDURE — 3075F PR MOST RECENT SYSTOLIC BLOOD PRESS GE 130-139MM HG: ICD-10-PCS | Mod: CPTII,S$GLB,, | Performed by: STUDENT IN AN ORGANIZED HEALTH CARE EDUCATION/TRAINING PROGRAM

## 2022-09-29 PROCEDURE — 99214 OFFICE O/P EST MOD 30 MIN: CPT | Mod: S$GLB,,, | Performed by: STUDENT IN AN ORGANIZED HEALTH CARE EDUCATION/TRAINING PROGRAM

## 2022-09-29 PROCEDURE — 3079F DIAST BP 80-89 MM HG: CPT | Mod: CPTII,S$GLB,, | Performed by: STUDENT IN AN ORGANIZED HEALTH CARE EDUCATION/TRAINING PROGRAM

## 2022-09-29 RX ORDER — BUSPIRONE HYDROCHLORIDE 15 MG/1
TABLET ORAL
Qty: 60 TABLET | Refills: 1 | Status: SHIPPED | OUTPATIENT
Start: 2022-09-29 | End: 2022-10-27

## 2022-09-29 RX ORDER — LORAZEPAM 0.5 MG/1
TABLET ORAL
Qty: 30 TABLET | Refills: 2 | Status: SHIPPED | OUTPATIENT
Start: 2022-09-29 | End: 2023-05-09 | Stop reason: SDUPTHER

## 2022-09-29 RX ORDER — LAMOTRIGINE 200 MG/1
200 TABLET ORAL DAILY
Qty: 30 TABLET | Refills: 2 | Status: SHIPPED | OUTPATIENT
Start: 2022-09-29 | End: 2022-10-27 | Stop reason: SDUPTHER

## 2022-09-29 RX ORDER — ARIPIPRAZOLE 5 MG/1
5 TABLET ORAL DAILY
Qty: 30 TABLET | Refills: 2 | Status: SHIPPED | OUTPATIENT
Start: 2022-09-29 | End: 2022-10-27 | Stop reason: SDUPTHER
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
## 2022-10-24 NOTE — PROGRESS NOTES
STAFF COMMENTS: I have discussed pt with Dr. Jorge and reviewed the history and exam. I agree and concur with the assessment and plan.

## 2022-10-27 ENCOUNTER — OFFICE VISIT (OUTPATIENT)
Dept: PSYCHIATRY | Facility: CLINIC | Age: 32
End: 2022-10-27
Payer: COMMERCIAL

## 2022-10-27 DIAGNOSIS — F41.1 GAD (GENERALIZED ANXIETY DISORDER): ICD-10-CM

## 2022-10-27 DIAGNOSIS — F31.81 BIPOLAR 2 DISORDER: Primary | ICD-10-CM

## 2022-10-27 PROCEDURE — 99999 PR PBB SHADOW E&M-EST. PATIENT-LVL I: ICD-10-PCS | Mod: PBBFAC,,, | Performed by: STUDENT IN AN ORGANIZED HEALTH CARE EDUCATION/TRAINING PROGRAM

## 2022-10-27 PROCEDURE — 99999 PR PBB SHADOW E&M-EST. PATIENT-LVL I: CPT | Mod: PBBFAC,,, | Performed by: STUDENT IN AN ORGANIZED HEALTH CARE EDUCATION/TRAINING PROGRAM

## 2022-10-27 PROCEDURE — 99213 PR OFFICE/OUTPT VISIT, EST, LEVL III, 20-29 MIN: ICD-10-PCS | Mod: 95,S$GLB,, | Performed by: STUDENT IN AN ORGANIZED HEALTH CARE EDUCATION/TRAINING PROGRAM

## 2022-10-27 PROCEDURE — 99213 OFFICE O/P EST LOW 20 MIN: CPT | Mod: 95,S$GLB,, | Performed by: STUDENT IN AN ORGANIZED HEALTH CARE EDUCATION/TRAINING PROGRAM

## 2022-10-27 RX ORDER — LAMOTRIGINE 200 MG/1
200 TABLET ORAL DAILY
Qty: 30 TABLET | Refills: 2 | Status: SHIPPED | OUTPATIENT
Start: 2022-10-27 | End: 2023-03-02 | Stop reason: SDUPTHER

## 2022-10-27 RX ORDER — ARIPIPRAZOLE 5 MG/1
5 TABLET ORAL DAILY
Qty: 30 TABLET | Refills: 2 | Status: SHIPPED | OUTPATIENT
Start: 2022-10-27 | End: 2023-03-13 | Stop reason: SDUPTHER

## 2022-10-27 RX ORDER — BUSPIRONE HYDROCHLORIDE 15 MG/1
15 TABLET ORAL 2 TIMES DAILY
Qty: 60 TABLET | Refills: 2 | Status: SHIPPED | OUTPATIENT
Start: 2022-10-27 | End: 2023-03-02 | Stop reason: SDUPTHER

## 2022-10-27 NOTE — PROGRESS NOTES
Outpatient Psychiatry Follow-Up Visit (MD/NP)    10/27/2022    Clinical Status of Patient:  Outpatient (Ambulatory)    Chief Complaint:  Ratna Hanson is a 31 y.o. female who presents today for follow-up of mood disorder and anxiety. Patient last seen for follow-up on 9/29/22. Met with patient via virtual platform.      TELE PSYCHIATRY Disclaimer   *The patient was informed despite using HIPPA compliant technology there may be risks including security breach, technological failure, inability to perform a comprehensive physical exam which could delay or prevent an accurate diagnosis, and potential complications from treatment decisions rendered over a telemedical platform. The patient understands and consented to the use of tele-health service as being a safe measure to mitigate during COVID 19 Pandemic .  The patient was also informed of the relationship between the physician and patient and the respective role of any other health care provider with respect to management of the patient; and notified that the pt may decline to receive medical services by telemedicine and may withdraw from such care at any time.     Patient's Current location: [facility] [address], [place] [facility] (home)  In Case of Emergency pts next of kin: see facesheet  Visit type: Virtual visit with synchronous audio and video  Total time spent with patient: 20 min     Interval History and Content of Current Session:  Interim Events/Subjective Report/Content of Current Session:     Pt stated that things have been pretty good. Has been busy; stated that she just dropped her kids off at school as her 's been sick, and has been busy at her new job, which she's been enjoying. Started taking Buspar; increased up to 15mg BID. Endorsed some improvement in anxiety; hasn't had any recent panic attacks and hasn't used any PRN Ativan since starting Buspar. Denied any s/e.   Mood has been pretty stable. Will have occasional, brief episodes of depressed  "mood, but "they don't really matter"/affect her that much. No recent risky behaviors, no flight of ideas, psychomotor agitation. No rapid or pressured speech on interview.  Sleep has been better, as she's not having as many anxious thoughts when trying to fall asleep. Appetite stable. Denied any changes in energy or concentration. Denied SI/HI/AVH.   Has been compliant with Lamictal and Abilify. Denied any s/e, including rash, muscle tension, restlessness.  Forgot about labs (lipid panel, A1c, CMP); will get them done by next appointment.   Would like to continue with current medication regimen. Denied any other questions/concerns.       Psychiatric Review of Systems-is patient experiencing or having changes in  sleep: improved  appetite: no  weight: no  energy/anergy: no  anhedonia: no  anxiety: improved  guilty/hopelessness: no  concentration: no  Irritability: improved  Paranoia/delusions: no  S.I.B.s/risky behavior: no    Review of Systems   PSYCHIATRIC: Pertinant items are noted in the narrative.  CONSTITUTIONAL: No weight gain or loss.  MUSCULOSKELETAL: No pain or stiffness of the joints.  NEUROLOGIC: No weakness, sensory changes, seizures, confusion, memory loss, tremor or other abnormal movements.  RESPIRATORY: No shortness of breath.  CARDIOVASCULAR: No tachycardia or chest pain.  GASTROINTESTINAL: No nausea, vomiting, pain, constipation or diarrhea.    Past Medical, Family and Social History: The patient's past medical, family and social history have been reviewed and updated as appropriate within the electronic medical record - see encounter notes.    Compliance: yes    Side effects:  Ativan: drowsiness    Risk Parameters:  Patient reports no suicidal ideation  Patient reports no homicidal ideation  Patient reports no self-injurious behavior  Patient reports no violent behavior    Exam (detailed: at least 9 elements; comprehensive: all 15 elements)   Constitutional  Vitals:  Most recent vital signs, dated " less than 90 days prior to this appointment, were reviewed.   There were no vitals filed for this visit.       General:  unremarkable, age appropriate, neatly groomed        Neurologic  Psychomotor signs:  CECI 2/2 virtual appt   Gait:  CECI 2/2 virtual appt   AIMS: CECI 2/2 virtual appt       Psychiatric  Speech:  no latency; no press   Mood & Affect:  euthymic  congruent and appropriate   Thought Process:  normal and logical   Associations:  intact   Thought Content:  no suicidality, no homicidality, delusions, or paranoia   Insight:  intact, has awareness of illness   Judgement: behavior is adequate to circumstances   Orientation:  grossly intact   Memory: intact for content of interview   Language: grossly intact   Attention Span & Concentration:  able to focus   Fund of Knowledge:  intact and appropriate to age and level of education     Assessment and Diagnosis   Status/Progress: Based on the examination today, the patient's problem(s) is/are improved.  New problems have not been presented today.   Co-morbidities, Diagnostic uncertainty, and Lack of compliance are not complicating management of the primary condition.      General Impression:    ICD-10-CM ICD-9-CM   1. Bipolar 2 disorder  F31.81 296.89   2. DUNCAN (generalized anxiety disorder)  F41.1 300.02         Intervention/Counseling/Treatment Plan   Continue Buspar 15 mg BID, for anxiety  Continue Lamictal 200 mg daily, for mood  Continue Abilify 5 mg daily, for mood  Continue PRN Ativan 0.5 mg daily, for anxiety  Labs/vitals: reviewed most recent; lipid panel, A1c, CMP ordered; pt to complete prior to next appointment  : [place] Prescription Monitoring Program was reviewed with no evidence of inconsistencies either in the patient's account or healthcare provider continuity.    Discussed with patient informed consent including diagnosis, risks and benefits of proposed treatment above vs. alternative treatments vs. no treatment, as well as serious and  common side effects of these treatments, and the inherent unpredictability of individual responses to these treatments. The patient expresses understanding of the above and displays the capacity to agree with this current plan. Patient also agrees that, currently, the benefits outweigh the risks and would like to pursue treatment at this time, and had no other questions.    Instructions:  Take all medications as prescribed.    Abstain from recreational drugs and alcohol.  Present to ED or call 911 for SI/HI plan or intent, psychosis, or medical emergency.    Return to Clinic:  2 months, or sooner as needed    Case to be discussed with supervising staff, Dr. Alesia Jorge MD  [facility], PGY-3

## 2023-03-02 ENCOUNTER — OFFICE VISIT (OUTPATIENT)
Dept: PRIMARY CARE CLINIC | Facility: CLINIC | Age: 33
End: 2023-03-02
Payer: COMMERCIAL

## 2023-03-02 ENCOUNTER — PATIENT MESSAGE (OUTPATIENT)
Dept: PSYCHIATRY | Facility: CLINIC | Age: 33
End: 2023-03-02
Payer: COMMERCIAL

## 2023-03-02 VITALS
SYSTOLIC BLOOD PRESSURE: 112 MMHG | RESPIRATION RATE: 17 BRPM | DIASTOLIC BLOOD PRESSURE: 76 MMHG | BODY MASS INDEX: 21.71 KG/M2 | HEART RATE: 73 BPM | TEMPERATURE: 98 F | HEIGHT: 61 IN | OXYGEN SATURATION: 99 % | WEIGHT: 115 LBS

## 2023-03-02 DIAGNOSIS — Z76.89 ENCOUNTER TO ESTABLISH CARE: Primary | ICD-10-CM

## 2023-03-02 DIAGNOSIS — F41.9 ANXIETY: ICD-10-CM

## 2023-03-02 DIAGNOSIS — K21.9 GASTROESOPHAGEAL REFLUX DISEASE, UNSPECIFIED WHETHER ESOPHAGITIS PRESENT: ICD-10-CM

## 2023-03-02 DIAGNOSIS — R11.0 CHRONIC NAUSEA: ICD-10-CM

## 2023-03-02 DIAGNOSIS — G43.009 MIGRAINE WITHOUT AURA AND WITHOUT STATUS MIGRAINOSUS, NOT INTRACTABLE: ICD-10-CM

## 2023-03-02 DIAGNOSIS — F41.1 GAD (GENERALIZED ANXIETY DISORDER): ICD-10-CM

## 2023-03-02 DIAGNOSIS — F31.81 BIPOLAR 2 DISORDER: ICD-10-CM

## 2023-03-02 DIAGNOSIS — F31.9 BIPOLAR DEPRESSION: ICD-10-CM

## 2023-03-02 PROCEDURE — 3008F PR BODY MASS INDEX (BMI) DOCUMENTED: ICD-10-PCS | Mod: CPTII,S$GLB,, | Performed by: STUDENT IN AN ORGANIZED HEALTH CARE EDUCATION/TRAINING PROGRAM

## 2023-03-02 PROCEDURE — 99999 PR PBB SHADOW E&M-EST. PATIENT-LVL IV: ICD-10-PCS | Mod: PBBFAC,,, | Performed by: STUDENT IN AN ORGANIZED HEALTH CARE EDUCATION/TRAINING PROGRAM

## 2023-03-02 PROCEDURE — 99204 PR OFFICE/OUTPT VISIT, NEW, LEVL IV, 45-59 MIN: ICD-10-PCS | Mod: S$GLB,,, | Performed by: STUDENT IN AN ORGANIZED HEALTH CARE EDUCATION/TRAINING PROGRAM

## 2023-03-02 PROCEDURE — 3008F BODY MASS INDEX DOCD: CPT | Mod: CPTII,S$GLB,, | Performed by: STUDENT IN AN ORGANIZED HEALTH CARE EDUCATION/TRAINING PROGRAM

## 2023-03-02 PROCEDURE — 1159F PR MEDICATION LIST DOCUMENTED IN MEDICAL RECORD: ICD-10-PCS | Mod: CPTII,S$GLB,, | Performed by: STUDENT IN AN ORGANIZED HEALTH CARE EDUCATION/TRAINING PROGRAM

## 2023-03-02 PROCEDURE — 93010 ELECTROCARDIOGRAM REPORT: CPT | Mod: S$GLB,,, | Performed by: INTERNAL MEDICINE

## 2023-03-02 PROCEDURE — 3074F PR MOST RECENT SYSTOLIC BLOOD PRESSURE < 130 MM HG: ICD-10-PCS | Mod: CPTII,S$GLB,, | Performed by: STUDENT IN AN ORGANIZED HEALTH CARE EDUCATION/TRAINING PROGRAM

## 2023-03-02 PROCEDURE — 1159F MED LIST DOCD IN RCRD: CPT | Mod: CPTII,S$GLB,, | Performed by: STUDENT IN AN ORGANIZED HEALTH CARE EDUCATION/TRAINING PROGRAM

## 2023-03-02 PROCEDURE — 93005 ELECTROCARDIOGRAM TRACING: CPT | Mod: S$GLB,,, | Performed by: STUDENT IN AN ORGANIZED HEALTH CARE EDUCATION/TRAINING PROGRAM

## 2023-03-02 PROCEDURE — 99204 OFFICE O/P NEW MOD 45 MIN: CPT | Mod: S$GLB,,, | Performed by: STUDENT IN AN ORGANIZED HEALTH CARE EDUCATION/TRAINING PROGRAM

## 2023-03-02 PROCEDURE — 3078F PR MOST RECENT DIASTOLIC BLOOD PRESSURE < 80 MM HG: ICD-10-PCS | Mod: CPTII,S$GLB,, | Performed by: STUDENT IN AN ORGANIZED HEALTH CARE EDUCATION/TRAINING PROGRAM

## 2023-03-02 PROCEDURE — 99999 PR PBB SHADOW E&M-EST. PATIENT-LVL IV: CPT | Mod: PBBFAC,,, | Performed by: STUDENT IN AN ORGANIZED HEALTH CARE EDUCATION/TRAINING PROGRAM

## 2023-03-02 PROCEDURE — 93005 EKG 12-LEAD: ICD-10-PCS | Mod: S$GLB,,, | Performed by: STUDENT IN AN ORGANIZED HEALTH CARE EDUCATION/TRAINING PROGRAM

## 2023-03-02 PROCEDURE — 3074F SYST BP LT 130 MM HG: CPT | Mod: CPTII,S$GLB,, | Performed by: STUDENT IN AN ORGANIZED HEALTH CARE EDUCATION/TRAINING PROGRAM

## 2023-03-02 PROCEDURE — 3078F DIAST BP <80 MM HG: CPT | Mod: CPTII,S$GLB,, | Performed by: STUDENT IN AN ORGANIZED HEALTH CARE EDUCATION/TRAINING PROGRAM

## 2023-03-02 PROCEDURE — 1160F PR REVIEW ALL MEDS BY PRESCRIBER/CLIN PHARMACIST DOCUMENTED: ICD-10-PCS | Mod: CPTII,S$GLB,, | Performed by: STUDENT IN AN ORGANIZED HEALTH CARE EDUCATION/TRAINING PROGRAM

## 2023-03-02 PROCEDURE — 1160F RVW MEDS BY RX/DR IN RCRD: CPT | Mod: CPTII,S$GLB,, | Performed by: STUDENT IN AN ORGANIZED HEALTH CARE EDUCATION/TRAINING PROGRAM

## 2023-03-02 PROCEDURE — 93010 EKG 12-LEAD: ICD-10-PCS | Mod: S$GLB,,, | Performed by: INTERNAL MEDICINE

## 2023-03-02 RX ORDER — BUSPIRONE HYDROCHLORIDE 15 MG/1
15 TABLET ORAL 2 TIMES DAILY
Qty: 60 TABLET | Refills: 0 | Status: SHIPPED | OUTPATIENT
Start: 2023-03-02 | End: 2023-03-13 | Stop reason: SDUPTHER

## 2023-03-02 RX ORDER — SUCRALFATE 1 G/1
1 TABLET ORAL
Qty: 60 TABLET | Refills: 0 | Status: SHIPPED | OUTPATIENT
Start: 2023-03-02 | End: 2023-04-05

## 2023-03-02 RX ORDER — BUSPIRONE HYDROCHLORIDE 15 MG/1
15 TABLET ORAL 2 TIMES DAILY
Qty: 60 TABLET | Refills: 2 | Status: CANCELLED | OUTPATIENT
Start: 2023-03-02

## 2023-03-02 RX ORDER — ARIPIPRAZOLE 5 MG/1
5 TABLET ORAL DAILY
Qty: 30 TABLET | Refills: 2 | Status: CANCELLED | OUTPATIENT
Start: 2023-03-02

## 2023-03-02 RX ORDER — LORAZEPAM 0.5 MG/1
TABLET ORAL
Qty: 30 TABLET | Refills: 2 | Status: CANCELLED | OUTPATIENT
Start: 2023-03-02

## 2023-03-02 RX ORDER — OMEPRAZOLE 40 MG/1
40 CAPSULE, DELAYED RELEASE ORAL DAILY
Qty: 30 CAPSULE | Refills: 1 | Status: SHIPPED | OUTPATIENT
Start: 2023-03-02 | End: 2024-03-22

## 2023-03-02 RX ORDER — LAMOTRIGINE 200 MG/1
200 TABLET ORAL DAILY
Qty: 30 TABLET | Refills: 0 | Status: SHIPPED | OUTPATIENT
Start: 2023-03-02 | End: 2023-03-13 | Stop reason: SDUPTHER

## 2023-03-02 NOTE — PATIENT INSTRUCTIONS
Chronic Nausea:   - approximately 4 years of chronic nausea, states that waxes and wanes, will occasionally have vomiting occur.  Has been getting slowly worse over the last 4 years.   - seemed to start after pregnancy, had hyperemesis during pregnancy that significantly improved but stomach was never the same.   - has various triggers including brushing her teeth or eating.   - has not noted specific trigger foods, has tried Nexium over-the-counter once before with some improvement GERD symptoms but no resolution of nausea.   - does take Lamictal, Abilify and BuSpar daily.  Lamictal and Abilify been in place for approximately 4 years, BuSpar was started more recently.   - advised patient that we will use a trial of omeprazole 40 mg once a day in the morning for 2 weeks, if not significantly improved, would switch to try Carafate before meals and before bed for 2 weeks.   - with follow-up in 4 weeks to discuss results.  Additionally would avoid trigger foods including alcohol, caffeine, carbonation, spicy foods.    - getting EKG today as nausea located over sternum, and females to have atypical presentations for angina.    -differential diagnosis would include acid reflux, hiatal hernia, esophagitis, gastroparesis, medication side effect, pregnancy, or more mild GI issues accentuated by anxiety.    Bipolar 2, anxiety:   - patient seeing psych taking Lamictal, Abilify and BuSpar.   - seems to not be tolerating BuSpar very well.  Discussed that she may benefit from lowering the dose from BuSpar 15 mg twice a day to a daily dose of 20 mg or less, but would discuss that with her psychiatrist since they have an appointment coming up soon.   - is possible that her nausea is related to the Lamictal Abilify which she has been taking for similar duration to her nausea symptoms.  But will attempt to evaluate other possibilities 1st.    Screening labs:   - patient due for screening labs, will get CBC, CMP, TSH, lipids and  A1c.

## 2023-03-02 NOTE — TELEPHONE ENCOUNTER
Sure. Just sent in a refill for Lamictal 200 mg daily to pt's pharmacy.     ----- Message from Alison Kaminski MA sent at 3/1/2023  9:38 AM CST -----  Pt has an appt on 3/13 for 9:30am, and would like to know if you can send a refill of lamoTRIgine (LAMICTAL) 200 MG tablet to last her until her appt.

## 2023-03-02 NOTE — PROGRESS NOTES
Subjective:           Patient ID: Ratna Hanson is a 32 y.o. female who presents today with a chief complaint of new patient, nausea.    Chief Complaint:   Nausea (Constant, worse after eating. No actual vomiting )      History of Present Illness:    32-year-old female presenting to clinic today for evaluation of persistent nausea.  States has not been having vomiting.  Nausea seems to be worse after eating.  Would like to have labs to help evaluate this.    Patient has documented history of bipolar 2 and general anxiety disorder.  Listed as taking BuSpar 50 mg twice a day for anxiety, Lamictal 200 mg daily for mood, Abilify 5 mg daily for mood, and as needed Ativan 0.5 mg.    States she and her  both work from home, moved to [facility] about 2 years ago when they were looking for a new place to live.    Nausea:  States she had a 3yo, had hyper-emesis with pregnancy.  After delivery was much better, but the stomach has been off since then.  Has been having nausea after eating.  States she has also had issues with nausea when brushing her teeth.  Usually can control the nausea and not vomit, but does still vomit at times.  States get to the point of vomiting about 2 times a month.    Nausea is sensation in the upper chest.  Worse after eating.    Can feel like a burning at times.  Is not down in the abdomen.    Has been getting progressively worse over these last 4 year.    Has tried peppermint oil which can help.     Denies hx of acid reflux or GERD.  Normal bowels.      Is taking Abilify, Buspar and Lamictal, states nausea pre-dates these meds though Abilify and Lamictal were both started soon after daughter was born.        Review of Systems   Constitutional:  Negative for activity change, fatigue, fever and unexpected weight change.   HENT:  Negative for congestion, nosebleeds, sinus pressure and sneezing.    Respiratory:  Negative for cough, shortness of breath and wheezing.    Cardiovascular:  Negative  "for chest pain, palpitations and leg swelling.   Gastrointestinal:  Positive for nausea. Negative for abdominal distention, constipation, diarrhea and vomiting.   Genitourinary:  Negative for difficulty urinating and dysuria.   Musculoskeletal:  Negative for back pain and gait problem.   Skin:  Negative for pallor and rash.   Neurological:  Negative for weakness, numbness and headaches.   Psychiatric/Behavioral:  Positive for decreased concentration, dysphoric mood and sleep disturbance. Negative for agitation. The patient is nervous/anxious.          Objective:        Vitals:    03/02/23 1415   BP: 112/76   BP Location: Right arm   Patient Position: Sitting   BP Method: Medium (Manual)   Pulse: 73   Resp: 17   Temp: 98.3 °F (36.8 °C)   TempSrc: Temporal   SpO2: 99%   Weight: 52.2 kg (114 lb 15.5 oz)   Height: 5' 1" (1.549 m)       Body mass index is 21.72 kg/m².      Physical Exam  Constitutional:       General: She is not in acute distress.     Appearance: Normal appearance. She is not ill-appearing.   HENT:      Head: Normocephalic and atraumatic.      Right Ear: External ear normal.      Left Ear: External ear normal.      Nose: No rhinorrhea.      Mouth/Throat:      Mouth: Mucous membranes are moist.   Eyes:      Extraocular Movements: Extraocular movements intact.      Conjunctiva/sclera: Conjunctivae normal.   Cardiovascular:      Rate and Rhythm: Normal rate and regular rhythm.      Pulses: Normal pulses.      Heart sounds: No murmur heard.  Pulmonary:      Effort: Pulmonary effort is normal. No respiratory distress.   Abdominal:      Tenderness: There is no abdominal tenderness.   Musculoskeletal:      Right lower leg: No edema.      Left lower leg: No edema.   Skin:     Capillary Refill: Capillary refill takes less than 2 seconds.      Coloration: Skin is not jaundiced.      Findings: No bruising.   Neurological:      General: No focal deficit present.      Mental Status: She is alert and oriented to " person, place, and time.      Motor: No weakness.      Gait: Gait normal.   Psychiatric:         Mood and Affect: Mood normal.           Lab Results   Component Value Date     09/05/2018    K 4.4 09/05/2018     09/05/2018    CO2 23 09/05/2018    BUN 11 09/05/2018    CREATININE 0.6 09/05/2018    ANIONGAP 11 09/05/2018     No results found for: HGBA1C  No results found for: BNP, BNPTRIAGEBLO    Lab Results   Component Value Date    WBC 10.71 09/05/2018    HGB 10.2 (L) 09/05/2018    HCT 30.8 (L) 09/05/2018     09/05/2018    GRAN 8.1 (H) 09/05/2018    GRAN 75.1 (H) 09/05/2018     No results found for: CHOL, HDL, LDLCALC, TRIG       Current Outpatient Medications:     ARIPiprazole (ABILIFY) 5 MG Tab, Take 1 tablet (5 mg total) by mouth once daily., Disp: 30 tablet, Rfl: 2    busPIRone (BUSPAR) 15 MG tablet, Take 1 tablet (15 mg total) by mouth 2 (two) times daily., Disp: 60 tablet, Rfl: 0    lamoTRIgine (LAMICTAL) 200 MG tablet, Take 1 tablet (200 mg total) by mouth once daily., Disp: 30 tablet, Rfl: 0    LORazepam (ATIVAN) 0.5 MG tablet, TAKE 1 TABLET(0.5 MG) BY MOUTH DAILY AS NEEDED FOR ANXIETY, Disp: 30 tablet, Rfl: 2    omeprazole (PRILOSEC) 40 MG capsule, Take 1 capsule (40 mg total) by mouth once daily., Disp: 30 capsule, Rfl: 1    sucralfate (CARAFATE) 1 gram tablet, Take 1 tablet (1 g total) by mouth 4 (four) times daily before meals and nightly., Disp: 60 tablet, Rfl: 0     Outpatient Encounter Medications as of 3/2/2023   Medication Sig Dispense Refill    ARIPiprazole (ABILIFY) 5 MG Tab Take 1 tablet (5 mg total) by mouth once daily. 30 tablet 2    busPIRone (BUSPAR) 15 MG tablet Take 1 tablet (15 mg total) by mouth 2 (two) times daily. 60 tablet 0    lamoTRIgine (LAMICTAL) 200 MG tablet Take 1 tablet (200 mg total) by mouth once daily. 30 tablet 0    LORazepam (ATIVAN) 0.5 MG tablet TAKE 1 TABLET(0.5 MG) BY MOUTH DAILY AS NEEDED FOR ANXIETY 30 tablet 2    omeprazole (PRILOSEC) 40 MG capsule  Take 1 capsule (40 mg total) by mouth once daily. 30 capsule 1    sucralfate (CARAFATE) 1 gram tablet Take 1 tablet (1 g total) by mouth 4 (four) times daily before meals and nightly. 60 tablet 0    [DISCONTINUED] busPIRone (BUSPAR) 15 MG tablet Take 1 tablet (15 mg total) by mouth 2 (two) times daily. 60 tablet 2    [DISCONTINUED] lamoTRIgine (LAMICTAL) 200 MG tablet Take 1 tablet (200 mg total) by mouth once daily. 30 tablet 2    [DISCONTINUED] multivitamin capsule Take 1 capsule by mouth once daily.       No facility-administered encounter medications on file as of 3/2/2023.          Assessment:       1. Encounter to establish care    2. Anxiety    3. Migraine without aura and without status migrainosus, not intractable    4. Bipolar depression    5. Chronic nausea    6. Gastroesophageal reflux disease, unspecified whether esophagitis present           Plan:       Encounter to establish care    Anxiety  -     Comprehensive Metabolic Panel; Future; Expected date: 03/02/2023  -     Lipid Panel; Future; Expected date: 03/02/2023  -     Hemoglobin A1C; Future; Expected date: 03/02/2023  -     TSH; Future; Expected date: 03/02/2023  -     CBC Auto Differential; Future; Expected date: 03/02/2023  -     EKG 12-lead    Migraine without aura and without status migrainosus, not intractable  -     Comprehensive Metabolic Panel; Future; Expected date: 03/02/2023  -     Lipid Panel; Future; Expected date: 03/02/2023  -     Hemoglobin A1C; Future; Expected date: 03/02/2023  -     TSH; Future; Expected date: 03/02/2023  -     CBC Auto Differential; Future; Expected date: 03/02/2023    Bipolar depression    Chronic nausea  -     omeprazole (PRILOSEC) 40 MG capsule; Take 1 capsule (40 mg total) by mouth once daily.  Dispense: 30 capsule; Refill: 1  -     sucralfate (CARAFATE) 1 gram tablet; Take 1 tablet (1 g total) by mouth 4 (four) times daily before meals and nightly.  Dispense: 60 tablet; Refill: 0    Gastroesophageal reflux  disease, unspecified whether esophagitis present  -     omeprazole (PRILOSEC) 40 MG capsule; Take 1 capsule (40 mg total) by mouth once daily.  Dispense: 30 capsule; Refill: 1  -     sucralfate (CARAFATE) 1 gram tablet; Take 1 tablet (1 g total) by mouth 4 (four) times daily before meals and nightly.  Dispense: 60 tablet; Refill: 0  -     Comprehensive Metabolic Panel; Future; Expected date: 03/02/2023  -     Lipid Panel; Future; Expected date: 03/02/2023  -     Hemoglobin A1C; Future; Expected date: 03/02/2023  -     TSH; Future; Expected date: 03/02/2023  -     CBC Auto Differential; Future; Expected date: 03/02/2023  -     EKG 12-lead             Chronic Nausea:   - approximately 4 years of chronic nausea, states that waxes and wanes, will occasionally have vomiting occur.  Has been getting slowly worse over the last 4 years.   - seemed to start after pregnancy, had hyperemesis during pregnancy that significantly improved but stomach was never the same.   - has various triggers including brushing her teeth or eating.   - has not noted specific trigger foods, has tried Nexium over-the-counter once before with some improvement GERD symptoms but no resolution of nausea.   - does take Lamictal, Abilify and BuSpar daily.  Lamictal and Abilify been in place for approximately 4 years, BuSpar was started more recently.   - advised patient that we will use a trial of omeprazole 40 mg once a day in the morning for 2 weeks, if not significantly improved, would switch to try Carafate before meals and before bed for 2 weeks.   - with follow-up in 4 weeks to discuss results.  Additionally would avoid trigger foods including alcohol, caffeine, carbonation, spicy foods.    - getting EKG today as nausea located over sternum, and females to have atypical presentations for angina.    -differential diagnosis would include acid reflux, hiatal hernia, esophagitis, gastroparesis, medication side effect, pregnancy, or more mild GI  issues accentuated by anxiety.    Bipolar 2, anxiety:   - patient seeing psych taking Lamictal, Abilify and BuSpar.   - seems to not be tolerating BuSpar very well.  Discussed that she may benefit from lowering the dose from BuSpar 15 mg twice a day to a daily dose of 20 mg or less, but would discuss that with her psychiatrist since they have an appointment coming up soon.   - is possible that her nausea is related to the Lamictal Abilify which she has been taking for similar duration to her nausea symptoms.  But will attempt to evaluate other possibilities 1st.    Screening labs:   - patient due for screening labs, will get CBC, CMP, TSH, lipids and A1c.

## 2023-03-13 ENCOUNTER — OFFICE VISIT (OUTPATIENT)
Dept: PSYCHIATRY | Facility: CLINIC | Age: 33
End: 2023-03-13
Payer: COMMERCIAL

## 2023-03-13 VITALS
HEART RATE: 77 BPM | BODY MASS INDEX: 21.68 KG/M2 | WEIGHT: 114.75 LBS | SYSTOLIC BLOOD PRESSURE: 121 MMHG | DIASTOLIC BLOOD PRESSURE: 66 MMHG

## 2023-03-13 DIAGNOSIS — F31.81 BIPOLAR 2 DISORDER: ICD-10-CM

## 2023-03-13 DIAGNOSIS — F41.1 GAD (GENERALIZED ANXIETY DISORDER): ICD-10-CM

## 2023-03-13 PROCEDURE — 99999 PR PBB SHADOW E&M-EST. PATIENT-LVL II: CPT | Mod: PBBFAC,,, | Performed by: STUDENT IN AN ORGANIZED HEALTH CARE EDUCATION/TRAINING PROGRAM

## 2023-03-13 PROCEDURE — 3008F BODY MASS INDEX DOCD: CPT | Mod: CPTII,S$GLB,, | Performed by: STUDENT IN AN ORGANIZED HEALTH CARE EDUCATION/TRAINING PROGRAM

## 2023-03-13 PROCEDURE — 3008F PR BODY MASS INDEX (BMI) DOCUMENTED: ICD-10-PCS | Mod: CPTII,S$GLB,, | Performed by: STUDENT IN AN ORGANIZED HEALTH CARE EDUCATION/TRAINING PROGRAM

## 2023-03-13 PROCEDURE — 99213 OFFICE O/P EST LOW 20 MIN: CPT | Mod: 95,S$GLB,, | Performed by: STUDENT IN AN ORGANIZED HEALTH CARE EDUCATION/TRAINING PROGRAM

## 2023-03-13 PROCEDURE — 3074F PR MOST RECENT SYSTOLIC BLOOD PRESSURE < 130 MM HG: ICD-10-PCS | Mod: CPTII,S$GLB,, | Performed by: STUDENT IN AN ORGANIZED HEALTH CARE EDUCATION/TRAINING PROGRAM

## 2023-03-13 PROCEDURE — 99999 PR PBB SHADOW E&M-EST. PATIENT-LVL II: ICD-10-PCS | Mod: PBBFAC,,, | Performed by: STUDENT IN AN ORGANIZED HEALTH CARE EDUCATION/TRAINING PROGRAM

## 2023-03-13 PROCEDURE — 3044F HG A1C LEVEL LT 7.0%: CPT | Mod: CPTII,S$GLB,, | Performed by: STUDENT IN AN ORGANIZED HEALTH CARE EDUCATION/TRAINING PROGRAM

## 2023-03-13 PROCEDURE — 3078F PR MOST RECENT DIASTOLIC BLOOD PRESSURE < 80 MM HG: ICD-10-PCS | Mod: CPTII,S$GLB,, | Performed by: STUDENT IN AN ORGANIZED HEALTH CARE EDUCATION/TRAINING PROGRAM

## 2023-03-13 PROCEDURE — 3074F SYST BP LT 130 MM HG: CPT | Mod: CPTII,S$GLB,, | Performed by: STUDENT IN AN ORGANIZED HEALTH CARE EDUCATION/TRAINING PROGRAM

## 2023-03-13 PROCEDURE — 3044F PR MOST RECENT HEMOGLOBIN A1C LEVEL <7.0%: ICD-10-PCS | Mod: CPTII,S$GLB,, | Performed by: STUDENT IN AN ORGANIZED HEALTH CARE EDUCATION/TRAINING PROGRAM

## 2023-03-13 PROCEDURE — 99213 PR OFFICE/OUTPT VISIT, EST, LEVL III, 20-29 MIN: ICD-10-PCS | Mod: 95,S$GLB,, | Performed by: STUDENT IN AN ORGANIZED HEALTH CARE EDUCATION/TRAINING PROGRAM

## 2023-03-13 PROCEDURE — 3078F DIAST BP <80 MM HG: CPT | Mod: CPTII,S$GLB,, | Performed by: STUDENT IN AN ORGANIZED HEALTH CARE EDUCATION/TRAINING PROGRAM

## 2023-03-13 RX ORDER — BUSPIRONE HYDROCHLORIDE 15 MG/1
TABLET ORAL
Qty: 45 TABLET | Refills: 2 | Status: SHIPPED | OUTPATIENT
Start: 2023-03-13 | End: 2023-05-09 | Stop reason: SDUPTHER

## 2023-03-13 RX ORDER — ARIPIPRAZOLE 5 MG/1
5 TABLET ORAL DAILY
Qty: 30 TABLET | Refills: 2 | Status: SHIPPED | OUTPATIENT
Start: 2023-03-13 | End: 2023-05-09 | Stop reason: SDUPTHER

## 2023-03-13 RX ORDER — LAMOTRIGINE 200 MG/1
200 TABLET ORAL DAILY
Qty: 30 TABLET | Refills: 2 | Status: SHIPPED | OUTPATIENT
Start: 2023-03-13 | End: 2023-05-09 | Stop reason: SDUPTHER

## 2023-03-13 NOTE — PROGRESS NOTES
"Outpatient Psychiatry Follow-Up Visit (MD/NP)    3/13/2023    Clinical Status of Patient:  Outpatient (Ambulatory)    Chief Complaint:  Ratna Hanson is a 32 y.o. female who presents today for follow-up of mood disorder and anxiety. Patient last seen for follow-up on 10/27/22. Met with patient via virtual platform.      TELE PSYCHIATRY Disclaimer   *The patient was informed despite using HIPPA compliant technology there may be risks including security breach, technological failure, inability to perform a comprehensive physical exam which could delay or prevent an accurate diagnosis, and potential complications from treatment decisions rendered over a telemedical platform. The patient understands and consented to the use of tele-health service as being a safe measure to mitigate during COVID 19 Pandemic .  The patient was also informed of the relationship between the physician and patient and the respective role of any other health care provider with respect to management of the patient; and notified that the pt may decline to receive medical services by telemedicine and may withdraw from such care at any time.     Patient's Current location: [facility] [address], [place] [facility] (home)  In Case of Emergency pts next of kin: see facesheet  Visit type: Virtual visit with synchronous audio and video  Total time spent with patient: 20 min     Interval History and Content of Current Session:  Interim Events/Subjective Report/Content of Current Session:     Pt stated that "one of the medications (Buspar) has been making (her) sick"; has been feeling nauseous almost daily since starting it, although feels it's been very helpful for anxiety.  Had recent PCP appointment and was recommended to decrease dose to 7.5mg qAM and 15mg qPM; since the decrease, pt stated that she's been doing better.   Mood has been "pretty good." Sleep has been "ok"; feels like she "(gets) 6-7 hours, but (is) always tired," although "still getting " "things done." Appetite stable.   Since starting Buspar, has "barely taken Ativan."  Stated that she's "still having some anger issues; (she snaps) at (her) kids every day. It's hard to stay calm sometimes."   Work has been going well.   Has been thinking about finding a therapist/counselor.   Would like to continue with current medication regimen. Denied any other questions/concerns.       Psychiatric Review of Systems-is patient experiencing or having changes in  sleep: improved  appetite: no  weight: no  energy/anergy: no  anhedonia: no  anxiety: improved  guilty/hopelessness: no  concentration: no  Irritability: improved  Paranoia/delusions: no  S.I.B.s/risky behavior: no    Review of Systems   PSYCHIATRIC: Pertinant items are noted in the narrative.  CONSTITUTIONAL: No weight gain or loss.  MUSCULOSKELETAL: No pain or stiffness of the joints.  NEUROLOGIC: No weakness, sensory changes, seizures, confusion, memory loss, tremor or other abnormal movements.  RESPIRATORY: No shortness of breath.  CARDIOVASCULAR: No tachycardia or chest pain.  GASTROINTESTINAL: No nausea, vomiting, pain, constipation or diarrhea.    Past Medical, Family and Social History: The patient's past medical, family and social history have been reviewed and updated as appropriate within the electronic medical record - see encounter notes.    Compliance: yes    Side effects:  Ativan: drowsiness . Buspar (at 15mg BID): nausea (improved when AM dose was decreased to 7.5mg)    Risk Parameters:  Patient reports no suicidal ideation  Patient reports no homicidal ideation  Patient reports no self-injurious behavior  Patient reports no violent behavior    Exam (detailed: at least 9 elements; comprehensive: all 15 elements)   Constitutional  Vitals:  Most recent vital signs, dated less than 90 days prior to this appointment, were reviewed.   Vitals:    03/13/23 0916   BP: 121/66   Pulse: 77   Weight: 52 kg (114 lb 12 oz)          General:  " unremarkable, age appropriate, neatly groomed        Neurologic  Psychomotor signs:  CECI 2/2 virtual appt   Gait:  CECI 2/2 virtual appt   AIMS: CECI 2/2 virtual appt       Psychiatric  Speech:  no latency; no press   Mood & Affect:  euthymic  congruent and appropriate   Thought Process:  normal and logical   Associations:  intact   Thought Content:  no suicidality, no homicidality, delusions, or paranoia   Insight:  intact, has awareness of illness   Judgement: behavior is adequate to circumstances   Orientation:  grossly intact   Memory: intact for content of interview   Language: grossly intact   Attention Span & Concentration:  able to focus   Fund of Knowledge:  intact and appropriate to age and level of education     Assessment and Diagnosis   Status/Progress: Based on the examination today, the patient's problem(s) is/are improved.  New problems have not been presented today.   Co-morbidities, Diagnostic uncertainty, and Lack of compliance are not complicating management of the primary condition.      General Impression:    ICD-10-CM ICD-9-CM   1. Bipolar 2 disorder  F31.81 296.89   2. DUNCAN (generalized anxiety disorder)  F41.1 300.02           Intervention/Counseling/Treatment Plan   Continue Buspar 7.5 mg qAM / 15 mg qPM , for anxiety  Continue Lamictal 200 mg daily, for mood  Continue Abilify 5 mg daily, for mood  Continue PRN Ativan 0.5 mg daily, for anxiety  Labs/vitals: reviewed most recent:  A1c (3/7/23): 5.1. Lipid panel (3/7/23): WNL  : [place] Prescription Monitoring Program was reviewed with no evidence of inconsistencies either in the patient's account or healthcare provider continuity.  Pt endorsed interest in psychotherapy; resources provided    Discussed with patient informed consent including diagnosis, risks and benefits of proposed treatment above vs. alternative treatments vs. no treatment, as well as serious and common side effects of these treatments, and the inherent unpredictability  of individual responses to these treatments. The patient expresses understanding of the above and displays the capacity to agree with this current plan. Patient also agrees that, currently, the benefits outweigh the risks and would like to pursue treatment at this time, and had no other questions.    Instructions:  Take all medications as prescribed.    Abstain from recreational drugs and alcohol.  Present to ED or call 911 for SI/HI plan or intent, psychosis, or medical emergency.    Return to Clinic:  4-6 weeks, or sooner as needed    Case to be discussed with supervising staff, Dr. Alesia Jorge MD  [facility] [place], PGY-3

## 2023-04-05 ENCOUNTER — OFFICE VISIT (OUTPATIENT)
Dept: PRIMARY CARE CLINIC | Facility: CLINIC | Age: 33
End: 2023-04-05
Payer: COMMERCIAL

## 2023-04-05 VITALS
TEMPERATURE: 98 F | HEIGHT: 61 IN | WEIGHT: 116.06 LBS | RESPIRATION RATE: 16 BRPM | OXYGEN SATURATION: 100 % | HEART RATE: 73 BPM | BODY MASS INDEX: 21.91 KG/M2 | SYSTOLIC BLOOD PRESSURE: 110 MMHG | DIASTOLIC BLOOD PRESSURE: 68 MMHG

## 2023-04-05 DIAGNOSIS — F41.9 ANXIETY: ICD-10-CM

## 2023-04-05 DIAGNOSIS — R11.10 REGURGITATION OF FOOD: ICD-10-CM

## 2023-04-05 DIAGNOSIS — R11.0 CHRONIC NAUSEA: Primary | ICD-10-CM

## 2023-04-05 PROCEDURE — 3078F PR MOST RECENT DIASTOLIC BLOOD PRESSURE < 80 MM HG: ICD-10-PCS | Mod: CPTII,S$GLB,, | Performed by: STUDENT IN AN ORGANIZED HEALTH CARE EDUCATION/TRAINING PROGRAM

## 2023-04-05 PROCEDURE — 99214 OFFICE O/P EST MOD 30 MIN: CPT | Mod: S$GLB,,, | Performed by: STUDENT IN AN ORGANIZED HEALTH CARE EDUCATION/TRAINING PROGRAM

## 2023-04-05 PROCEDURE — 99214 PR OFFICE/OUTPT VISIT, EST, LEVL IV, 30-39 MIN: ICD-10-PCS | Mod: S$GLB,,, | Performed by: STUDENT IN AN ORGANIZED HEALTH CARE EDUCATION/TRAINING PROGRAM

## 2023-04-05 PROCEDURE — 3078F DIAST BP <80 MM HG: CPT | Mod: CPTII,S$GLB,, | Performed by: STUDENT IN AN ORGANIZED HEALTH CARE EDUCATION/TRAINING PROGRAM

## 2023-04-05 PROCEDURE — 1159F MED LIST DOCD IN RCRD: CPT | Mod: CPTII,S$GLB,, | Performed by: STUDENT IN AN ORGANIZED HEALTH CARE EDUCATION/TRAINING PROGRAM

## 2023-04-05 PROCEDURE — 3074F PR MOST RECENT SYSTOLIC BLOOD PRESSURE < 130 MM HG: ICD-10-PCS | Mod: CPTII,S$GLB,, | Performed by: STUDENT IN AN ORGANIZED HEALTH CARE EDUCATION/TRAINING PROGRAM

## 2023-04-05 PROCEDURE — 1159F PR MEDICATION LIST DOCUMENTED IN MEDICAL RECORD: ICD-10-PCS | Mod: CPTII,S$GLB,, | Performed by: STUDENT IN AN ORGANIZED HEALTH CARE EDUCATION/TRAINING PROGRAM

## 2023-04-05 PROCEDURE — 3044F PR MOST RECENT HEMOGLOBIN A1C LEVEL <7.0%: ICD-10-PCS | Mod: CPTII,S$GLB,, | Performed by: STUDENT IN AN ORGANIZED HEALTH CARE EDUCATION/TRAINING PROGRAM

## 2023-04-05 PROCEDURE — 3008F BODY MASS INDEX DOCD: CPT | Mod: CPTII,S$GLB,, | Performed by: STUDENT IN AN ORGANIZED HEALTH CARE EDUCATION/TRAINING PROGRAM

## 2023-04-05 PROCEDURE — 3008F PR BODY MASS INDEX (BMI) DOCUMENTED: ICD-10-PCS | Mod: CPTII,S$GLB,, | Performed by: STUDENT IN AN ORGANIZED HEALTH CARE EDUCATION/TRAINING PROGRAM

## 2023-04-05 PROCEDURE — 1160F PR REVIEW ALL MEDS BY PRESCRIBER/CLIN PHARMACIST DOCUMENTED: ICD-10-PCS | Mod: CPTII,S$GLB,, | Performed by: STUDENT IN AN ORGANIZED HEALTH CARE EDUCATION/TRAINING PROGRAM

## 2023-04-05 PROCEDURE — 1160F RVW MEDS BY RX/DR IN RCRD: CPT | Mod: CPTII,S$GLB,, | Performed by: STUDENT IN AN ORGANIZED HEALTH CARE EDUCATION/TRAINING PROGRAM

## 2023-04-05 PROCEDURE — 99999 PR PBB SHADOW E&M-EST. PATIENT-LVL IV: CPT | Mod: PBBFAC,,, | Performed by: STUDENT IN AN ORGANIZED HEALTH CARE EDUCATION/TRAINING PROGRAM

## 2023-04-05 PROCEDURE — 3074F SYST BP LT 130 MM HG: CPT | Mod: CPTII,S$GLB,, | Performed by: STUDENT IN AN ORGANIZED HEALTH CARE EDUCATION/TRAINING PROGRAM

## 2023-04-05 PROCEDURE — 3044F HG A1C LEVEL LT 7.0%: CPT | Mod: CPTII,S$GLB,, | Performed by: STUDENT IN AN ORGANIZED HEALTH CARE EDUCATION/TRAINING PROGRAM

## 2023-04-05 PROCEDURE — 99999 PR PBB SHADOW E&M-EST. PATIENT-LVL IV: ICD-10-PCS | Mod: PBBFAC,,, | Performed by: STUDENT IN AN ORGANIZED HEALTH CARE EDUCATION/TRAINING PROGRAM

## 2023-04-05 NOTE — PATIENT INSTRUCTIONS
Regurgitation with chronic nausea:   - of nausea, having regurgitation of undigested food several times a week.   - try PPI with minimal improvement in symptoms, Carafate was not very helpful for patient.   - concern for esophageal web/stricture/ring vs GERD vs drug side effects, all of which could be magnified by anxiety.   - getting barium swallow / fluoroscopic esophagram at this time.   - referral to GI to consider for EGD.    Anxiety:   - seeing psych for this issue.    Lab review:   - discussed CBC, CMP, Lipids, TSH which were wnl.

## 2023-04-05 NOTE — PROGRESS NOTES
Subjective:           Patient ID: Ratna Hanson is a 32 y.o. female who presents today with a chief complaint of chronic nausea.    Chief Complaint:   Follow-up (Chronic nausea)      History of Present Illness:    31yo female presenting for chronic nausea.     Discussed 4 years of nausea with patient at appt 1 month ago.  Ddx was acid reflux, hiatal hernia, esophagitis, gastroparesis, medication side effect, pregnancy, or more mild GI issues accentuated by anxiety.    Takes Lamictal, Abilify, and Buspar daily.     Got labs.  Trial of PPI and Carafate.    States that PPI seemed to help a bit, but some mornings she had trouble getting it down.  States would try to swallow the pill, then would be gagging or have it regurgitate it back up.    Tired the Carafate and did not feel that helped at all.     Feels like the nausea is in her upper chest.   Will regurgitate food at times, especially if it is very large.    States this happens a few times a week.    States seems worse with milk, chocolate and also a bit less commonly carbonation.      Review of Systems   Constitutional:  Negative for activity change, fatigue, fever and unexpected weight change.   HENT:  Negative for congestion, nosebleeds, sinus pressure and sneezing.    Respiratory:  Negative for cough, shortness of breath and wheezing.    Cardiovascular:  Negative for chest pain, palpitations and leg swelling.   Gastrointestinal:  Positive for nausea. Negative for abdominal distention, constipation, diarrhea and vomiting.   Genitourinary:  Negative for difficulty urinating and dysuria.   Musculoskeletal:  Negative for back pain and gait problem.   Skin:  Negative for pallor and rash.   Neurological:  Negative for weakness, numbness and headaches.   Psychiatric/Behavioral:  Positive for decreased concentration, dysphoric mood and sleep disturbance. Negative for agitation. The patient is nervous/anxious.          Objective:        Vitals:    04/05/23 0753   BP:  "110/68   BP Location: Right arm   Patient Position: Sitting   BP Method: Medium (Manual)   Pulse: 73   Resp: 16   Temp: 98 °F (36.7 °C)   TempSrc: Temporal   SpO2: 100%   Weight: 52.7 kg (116 lb 1.2 oz)   Height: 5' 1" (1.549 m)       Body mass index is 21.93 kg/m².      Physical Exam  Constitutional:       General: She is not in acute distress.     Appearance: Normal appearance. She is not ill-appearing.   HENT:      Head: Normocephalic and atraumatic.      Right Ear: External ear normal.      Left Ear: External ear normal.      Nose: No rhinorrhea.   Eyes:      Extraocular Movements: Extraocular movements intact.      Conjunctiva/sclera: Conjunctivae normal.   Cardiovascular:      Rate and Rhythm: Normal rate.      Pulses: Normal pulses.   Pulmonary:      Effort: Pulmonary effort is normal.   Musculoskeletal:      Right lower leg: No edema.      Left lower leg: No edema.   Skin:     Capillary Refill: Capillary refill takes less than 2 seconds.      Coloration: Skin is not jaundiced.      Findings: No bruising.   Neurological:      General: No focal deficit present.      Mental Status: She is alert and oriented to person, place, and time.      Motor: No weakness.      Gait: Gait normal.   Psychiatric:         Mood and Affect: Mood normal.           Lab Results   Component Value Date     03/07/2023    K 4.5 03/07/2023     03/07/2023    CO2 24 03/07/2023    BUN 10 03/07/2023    CREATININE 0.8 03/07/2023    ANIONGAP 7 (L) 03/07/2023     Lab Results   Component Value Date    HGBA1C 5.1 03/07/2023     No results found for: BNP, BNPTRIAGEBLO    Lab Results   Component Value Date    WBC 6.55 03/07/2023    HGB 13.7 03/07/2023    HCT 41.8 03/07/2023     03/07/2023    GRAN 4.1 03/07/2023    GRAN 62.3 03/07/2023     Lab Results   Component Value Date    CHOL 145 03/07/2023    HDL 56 03/07/2023    LDLCALC 81.0 03/07/2023    TRIG 40 03/07/2023          Current Outpatient Medications:     ARIPiprazole " (ABILIFY) 5 MG Tab, Take 1 tablet (5 mg total) by mouth once daily., Disp: 30 tablet, Rfl: 2    busPIRone (BUSPAR) 15 MG tablet, Take 1/2 tablet (7.5 mg) by mouth in the morning and 1 tablet (15 mg) by mouth in the afternoon, Disp: 45 tablet, Rfl: 2    lamoTRIgine (LAMICTAL) 200 MG tablet, Take 1 tablet (200 mg total) by mouth once daily., Disp: 30 tablet, Rfl: 2    LORazepam (ATIVAN) 0.5 MG tablet, TAKE 1 TABLET(0.5 MG) BY MOUTH DAILY AS NEEDED FOR ANXIETY, Disp: 30 tablet, Rfl: 2    omeprazole (PRILOSEC) 40 MG capsule, Take 1 capsule (40 mg total) by mouth once daily., Disp: 30 capsule, Rfl: 1     Outpatient Encounter Medications as of 4/5/2023   Medication Sig Dispense Refill    ARIPiprazole (ABILIFY) 5 MG Tab Take 1 tablet (5 mg total) by mouth once daily. 30 tablet 2    busPIRone (BUSPAR) 15 MG tablet Take 1/2 tablet (7.5 mg) by mouth in the morning and 1 tablet (15 mg) by mouth in the afternoon 45 tablet 2    lamoTRIgine (LAMICTAL) 200 MG tablet Take 1 tablet (200 mg total) by mouth once daily. 30 tablet 2    LORazepam (ATIVAN) 0.5 MG tablet TAKE 1 TABLET(0.5 MG) BY MOUTH DAILY AS NEEDED FOR ANXIETY 30 tablet 2    omeprazole (PRILOSEC) 40 MG capsule Take 1 capsule (40 mg total) by mouth once daily. 30 capsule 1    [DISCONTINUED] sucralfate (CARAFATE) 1 gram tablet Take 1 tablet (1 g total) by mouth 4 (four) times daily before meals and nightly. 60 tablet 0     No facility-administered encounter medications on file as of 4/5/2023.          Assessment:       1. Chronic nausea    2. Regurgitation of food    3. Anxiety           Plan:       Chronic nausea  -     FL Esophagram Complete; Future; Expected date: 04/05/2023  -     Ambulatory referral/consult to Gastroenterology; Future; Expected date: 04/12/2023    Regurgitation of food  -     FL Esophagram Complete; Future; Expected date: 04/05/2023  -     Ambulatory referral/consult to Gastroenterology; Future; Expected date: 04/12/2023    Anxiety                Regurgitation with chronic nausea:   - of nausea, having regurgitation of undigested food several times a week.   - try PPI with minimal improvement in symptoms, Carafate was not very helpful for patient.   - concern for esophageal web/stricture/ring vs GERD vs drug side effects, all of which could be magnified by anxiety.   - getting barium swallow / fluoroscopic esophagram at this time.   - referral to GI to consider for EGD.    Anxiety:   - seeing psych for this issue.      Lab review:   - discussed CBC, CMP, Lipids, TSH which were wnl.

## 2023-04-18 ENCOUNTER — HOSPITAL ENCOUNTER (OUTPATIENT)
Dept: RADIOLOGY | Facility: HOSPITAL | Age: 33
Discharge: HOME | End: 2023-04-18
Attending: STUDENT IN AN ORGANIZED HEALTH CARE EDUCATION/TRAINING PROGRAM
Payer: COMMERCIAL

## 2023-04-18 DIAGNOSIS — R11.10 REGURGITATION OF FOOD: ICD-10-CM

## 2023-04-18 DIAGNOSIS — R11.0 CHRONIC NAUSEA: ICD-10-CM

## 2023-04-18 PROCEDURE — 74220 X-RAY XM ESOPHAGUS 1CNTRST: CPT | Mod: 26,,, | Performed by: RADIOLOGY

## 2023-04-18 PROCEDURE — 25500020 PHARM REV CODE 255: Performed by: STUDENT IN AN ORGANIZED HEALTH CARE EDUCATION/TRAINING PROGRAM

## 2023-04-18 PROCEDURE — 74220 X-RAY XM ESOPHAGUS 1CNTRST: CPT | Mod: TC

## 2023-04-18 PROCEDURE — A9698 NON-RAD CONTRAST MATERIALNOC: HCPCS | Performed by: STUDENT IN AN ORGANIZED HEALTH CARE EDUCATION/TRAINING PROGRAM

## 2023-04-18 PROCEDURE — 74220 FL ESOPHAGRAM COMPLETE: ICD-10-PCS | Mod: 26,,, | Performed by: RADIOLOGY

## 2023-04-18 RX ADMIN — BARIUM SULFATE 200 ML: 0.6 SUSPENSION ORAL at 09:04

## 2023-05-05 ENCOUNTER — OFFICE VISIT (OUTPATIENT)
Dept: GASTROENTEROLOGY | Facility: CLINIC | Age: 33
End: 2023-05-05
Payer: COMMERCIAL

## 2023-05-05 VITALS — HEIGHT: 61 IN | WEIGHT: 119.63 LBS | BODY MASS INDEX: 22.58 KG/M2

## 2023-05-05 DIAGNOSIS — R11.10 REGURGITATION OF FOOD: ICD-10-CM

## 2023-05-05 DIAGNOSIS — R11.0 CHRONIC NAUSEA: ICD-10-CM

## 2023-05-05 PROCEDURE — 99204 PR OFFICE/OUTPT VISIT, NEW, LEVL IV, 45-59 MIN: ICD-10-PCS | Mod: S$GLB,,, | Performed by: NURSE PRACTITIONER

## 2023-05-05 PROCEDURE — 99999 PR PBB SHADOW E&M-EST. PATIENT-LVL V: CPT | Mod: PBBFAC,,, | Performed by: NURSE PRACTITIONER

## 2023-05-05 PROCEDURE — 1159F MED LIST DOCD IN RCRD: CPT | Mod: CPTII,S$GLB,, | Performed by: NURSE PRACTITIONER

## 2023-05-05 PROCEDURE — 3008F BODY MASS INDEX DOCD: CPT | Mod: CPTII,S$GLB,, | Performed by: NURSE PRACTITIONER

## 2023-05-05 PROCEDURE — 3008F PR BODY MASS INDEX (BMI) DOCUMENTED: ICD-10-PCS | Mod: CPTII,S$GLB,, | Performed by: NURSE PRACTITIONER

## 2023-05-05 PROCEDURE — 99999 PR PBB SHADOW E&M-EST. PATIENT-LVL V: ICD-10-PCS | Mod: PBBFAC,,, | Performed by: NURSE PRACTITIONER

## 2023-05-05 PROCEDURE — 1159F PR MEDICATION LIST DOCUMENTED IN MEDICAL RECORD: ICD-10-PCS | Mod: CPTII,S$GLB,, | Performed by: NURSE PRACTITIONER

## 2023-05-05 PROCEDURE — 3044F HG A1C LEVEL LT 7.0%: CPT | Mod: CPTII,S$GLB,, | Performed by: NURSE PRACTITIONER

## 2023-05-05 PROCEDURE — 99204 OFFICE O/P NEW MOD 45 MIN: CPT | Mod: S$GLB,,, | Performed by: NURSE PRACTITIONER

## 2023-05-05 PROCEDURE — 3044F PR MOST RECENT HEMOGLOBIN A1C LEVEL <7.0%: ICD-10-PCS | Mod: CPTII,S$GLB,, | Performed by: NURSE PRACTITIONER

## 2023-05-05 NOTE — PROGRESS NOTES
GASTROENTEROLOGY CLINIC NOTE    Chief Complaint: Diagnoses of Chronic nausea and Regurgitation of food were pertinent to this visit.  Referring provider/PCP: Leonard Maria MD    Ratna Hanson is a 32 y.o. female who is a new patient to me with a PMH that's significant for anxiety and hyper-emesis gravidarum.  She is here today to establish care for nausea and dysphagia.   Symptoms have been ongoing for at least four years. Began during pregnancy; after delivery symptoms improve but did not completely resolve.   Having nausea after eating. Brushing teeth or even blowing nose will cause gagging which leads to vomiting. No hematemesis, coffee ground emesis, or old food with vomiting.   Nausea occurs daily throughout the day but seems to be worse with eating/drinking sugar. Carbonation does not affect nausea. Also feels that sugar causes frequent mucus production which can induce vomiting.   Nausea accompanied by early satiety and dysphagia.   Has difficulty swallowing pills. Feels pills or food will get stuck in back of throat and she will regurgitate food/medication. Denies rumination or odynophagia.     Has tried peppermint oil which can help. Has also tried Omeprazole (mild improvement) and carafate.      Denies hx of acid reflux, GERD, belching, abdominal pain.   Is experiencing heartburn 3-4 times a week. Is not able to take omeprazole consistency b/c having trouble swallowing capsule. Some relief with omeprazole when she is able to take it.   Normal bowels.      NSAIDs: No  Anticoagulation or Antiplatelet: No    History of H.pylori: no  H.pylori Treatment:  Prior Upper Endoscopy: no  Prior Colonoscopy: about 10 years ago  Due to diarrhea; no abnormal findings  Family h/o Colon Cancer: No  Family h/o Crohn's Disease or Ulcerative Colitis: No  Family h/o Celiac Sprue: No  Abdominal Surgeries: cholecystectomy      Review of Systems   Constitutional:  Negative for weight loss.   HENT:  Negative for sore  throat.    Eyes:  Negative for blurred vision.   Respiratory:  Negative for cough.    Cardiovascular:  Negative for chest pain.   Gastrointestinal:  Positive for heartburn, nausea and vomiting. Negative for abdominal pain, blood in stool, constipation, diarrhea and melena.   Genitourinary:  Negative for dysuria.   Musculoskeletal:  Negative for myalgias.   Skin:  Negative for rash.   Neurological:  Negative for headaches.   Endo/Heme/Allergies:  Negative for environmental allergies.   Psychiatric/Behavioral:  Negative for suicidal ideas. The patient is not nervous/anxious.      Past Medical History: has a past medical history of Anxiety, Bipolar disorder, Breast disorder, Cholestasis during pregnancy in third trimester, Postpartum depression, and Pre-eclampsia affecting pregnancy, antepartum.    Past Surgical History: has a past surgical history that includes Cholecystectomy; Breast surgery; [place] tooth extraction; and Augmentation of breast (Bilateral, 2016).    Family History:family history includes Heart attack in her paternal grandfather; Heart disease in her paternal grandfather; Hyperlipidemia in her sister; No Known Problems in her father, maternal grandmother, and paternal grandmother; Other in her mother.    Allergies: Review of patient's allergies indicates:  No Known Allergies    Social History: reports that she has never smoked. She has never used smokeless tobacco. She reports current alcohol use of about 2.0 standard drinks per week. She reports that she does not use drugs.    Home medications:   Current Outpatient Medications on File Prior to Visit   Medication Sig Dispense Refill    ARIPiprazole (ABILIFY) 5 MG Tab Take 1 tablet (5 mg total) by mouth once daily. 30 tablet 2    busPIRone (BUSPAR) 15 MG tablet Take 1/2 tablet (7.5 mg) by mouth in the morning and 1 tablet (15 mg) by mouth in the afternoon 45 tablet 2    lamoTRIgine (LAMICTAL) 200 MG tablet Take 1 tablet (200 mg total) by mouth once  "daily. 30 tablet 2    LORazepam (ATIVAN) 0.5 MG tablet TAKE 1 TABLET(0.5 MG) BY MOUTH DAILY AS NEEDED FOR ANXIETY 30 tablet 2    omeprazole (PRILOSEC) 40 MG capsule Take 1 capsule (40 mg total) by mouth once daily. 30 capsule 1     No current facility-administered medications on file prior to visit.       Vital signs:  Ht 5' 1" (1.549 m)   Wt 54.2 kg (119 lb 9.6 oz)   LMP 04/13/2023 (Exact Date)   BMI 22.60 kg/m²     Physical Exam  Vitals reviewed.   Constitutional:       General: She is not in acute distress.     Appearance: Normal appearance. She is not ill-appearing.   HENT:      Head: Normocephalic.   Cardiovascular:      Rate and Rhythm: Normal rate and regular rhythm.      Heart sounds: Normal heart sounds. No murmur heard.  Pulmonary:      Effort: Pulmonary effort is normal. No respiratory distress.      Breath sounds: Normal breath sounds.   Chest:      Chest wall: No tenderness.   Abdominal:      General: Bowel sounds are normal. There is no distension.      Palpations: Abdomen is soft.      Tenderness: There is no abdominal tenderness. Negative signs include Novak's sign.      Hernia: No hernia is present.   Skin:     General: Skin is warm.   Neurological:      Mental Status: She is alert and oriented to person, place, and time.   Psychiatric:         Mood and Affect: Mood normal.         Behavior: Behavior normal.       Routine labs:  Lab Results   Component Value Date    WBC 6.55 03/07/2023    HGB 13.7 03/07/2023    HCT 41.8 03/07/2023    MCV 97 03/07/2023     03/07/2023     No results found for: INR  No results found for: IRON, FERRITIN, TIBC, FESATURATED  Lab Results   Component Value Date     03/07/2023    K 4.5 03/07/2023     03/07/2023    CO2 24 03/07/2023    BUN 10 03/07/2023    CREATININE 0.8 03/07/2023     Lab Results   Component Value Date    ALBUMIN 4.4 03/07/2023    ALT 15 03/07/2023    AST 15 03/07/2023    ALKPHOS 59 03/07/2023    BILITOT 0.6 03/07/2023     No results " found for: GLUCOSE  Lab Results   Component Value Date    TSH 2.104 03/07/2023     Lab Results   Component Value Date    CALCIUM 9.4 03/07/2023       Imaging:  FL Esophagram Complete  Narrative: EXAMINATION:  FL ESOPHAGRAM COMPLETE    CLINICAL HISTORY:  Nausea    TECHNIQUE:  Contrast material: Barium sulfate suspension    Fluoroscopy time: 1.32 minutes    COMPARISON:  None    FINDINGS:  Swallowing: Normal.    Esophagus: Normal caliber.  Good primary stripping wave, noting mild proximal escape of contrast with the patient in GI position.  No tertiary contractions.    Gastroesophageal reflux: None observed.    Other findings: None.  Impression: No etiology for nausea identified.    Electronically signed by resident: Cassidy Grey  Date:    04/18/2023  Time:    09:39    Electronically signed by: Alon Pal MD  Date:    04/18/2023  Time:    11:16      I have reviewed prior labs, imaging, and notes.      Assessment:  1. Chronic nausea    2. Regurgitation of food        Plan:  Orders Placed This Encounter    Case Request Endoscopy: EGD (ESOPHAGOGASTRODUODENOSCOPY)     Continue omeprazole as previously prescribed. Can open capsule and take with small amount of applesauce. If unable to tolerate omeprazole, will switch to pantoprazole.   EGD to further evaluate nausea, heartburn, and regurgitation. Consider biopsy for H.pylori and celiac     Consider MBSS pending EGD results     Plan of care discussed with patient who is in agreement and verbalized understanding.     I have explained the planned procedures to the patient.The risks, benefits and alternatives of the procedure were also explained in detail. Patient verbalized understanding, all questions were answered. The patient agrees to proceed as planned    Follow Up: As Needed Pending Workup          Elizabeth Negron, APRN,FNP-BC  [facility]    (Portions of this note were dictated using voice recognition software and may contain  dictation related errors in spelling/grammar/syntax not found on text review)

## 2023-05-09 ENCOUNTER — OFFICE VISIT (OUTPATIENT)
Dept: PSYCHOLOGY | Facility: CLINIC | Age: 33
End: 2023-05-09
Payer: COMMERCIAL

## 2023-05-09 VITALS
HEART RATE: 68 BPM | SYSTOLIC BLOOD PRESSURE: 107 MMHG | DIASTOLIC BLOOD PRESSURE: 71 MMHG | TEMPERATURE: 98 F | OXYGEN SATURATION: 100 %

## 2023-05-09 DIAGNOSIS — F41.1 GAD (GENERALIZED ANXIETY DISORDER): ICD-10-CM

## 2023-05-09 DIAGNOSIS — F31.81 BIPOLAR 2 DISORDER: ICD-10-CM

## 2023-05-09 PROCEDURE — 1159F MED LIST DOCD IN RCRD: CPT | Mod: CPTII,S$GLB,, | Performed by: NURSE PRACTITIONER

## 2023-05-09 PROCEDURE — 3074F SYST BP LT 130 MM HG: CPT | Mod: CPTII,S$GLB,, | Performed by: NURSE PRACTITIONER

## 2023-05-09 PROCEDURE — 3078F PR MOST RECENT DIASTOLIC BLOOD PRESSURE < 80 MM HG: ICD-10-PCS | Mod: CPTII,S$GLB,, | Performed by: NURSE PRACTITIONER

## 2023-05-09 PROCEDURE — 3044F HG A1C LEVEL LT 7.0%: CPT | Mod: CPTII,S$GLB,, | Performed by: NURSE PRACTITIONER

## 2023-05-09 PROCEDURE — 3074F PR MOST RECENT SYSTOLIC BLOOD PRESSURE < 130 MM HG: ICD-10-PCS | Mod: CPTII,S$GLB,, | Performed by: NURSE PRACTITIONER

## 2023-05-09 PROCEDURE — 99999 PR PBB SHADOW E&M-EST. PATIENT-LVL III: ICD-10-PCS | Mod: PBBFAC,,, | Performed by: NURSE PRACTITIONER

## 2023-05-09 PROCEDURE — 99215 OFFICE O/P EST HI 40 MIN: CPT | Mod: S$GLB,,, | Performed by: NURSE PRACTITIONER

## 2023-05-09 PROCEDURE — 99999 PR PBB SHADOW E&M-EST. PATIENT-LVL III: CPT | Mod: PBBFAC,,, | Performed by: NURSE PRACTITIONER

## 2023-05-09 PROCEDURE — 99215 PR OFFICE/OUTPT VISIT, EST, LEVL V, 40-54 MIN: ICD-10-PCS | Mod: S$GLB,,, | Performed by: NURSE PRACTITIONER

## 2023-05-09 PROCEDURE — 3078F DIAST BP <80 MM HG: CPT | Mod: CPTII,S$GLB,, | Performed by: NURSE PRACTITIONER

## 2023-05-09 PROCEDURE — 1159F PR MEDICATION LIST DOCUMENTED IN MEDICAL RECORD: ICD-10-PCS | Mod: CPTII,S$GLB,, | Performed by: NURSE PRACTITIONER

## 2023-05-09 PROCEDURE — 3044F PR MOST RECENT HEMOGLOBIN A1C LEVEL <7.0%: ICD-10-PCS | Mod: CPTII,S$GLB,, | Performed by: NURSE PRACTITIONER

## 2023-05-09 RX ORDER — ARIPIPRAZOLE 5 MG/1
5 TABLET ORAL DAILY
Qty: 60 TABLET | Refills: 0 | Status: SHIPPED | OUTPATIENT
Start: 2023-05-09 | End: 2023-07-26 | Stop reason: SDUPTHER

## 2023-05-09 RX ORDER — ARIPIPRAZOLE 5 MG/1
5 TABLET ORAL DAILY
Qty: 30 TABLET | Refills: 2 | Status: CANCELLED | OUTPATIENT
Start: 2023-05-09

## 2023-05-09 RX ORDER — LORAZEPAM 0.5 MG/1
TABLET ORAL
Qty: 10 TABLET | Refills: 0 | Status: SHIPPED | OUTPATIENT
Start: 2023-05-09 | End: 2023-12-19 | Stop reason: SDUPTHER

## 2023-05-09 RX ORDER — ARIPIPRAZOLE 2 MG/1
2 TABLET ORAL DAILY
Qty: 60 TABLET | Refills: 0 | Status: SHIPPED | OUTPATIENT
Start: 2023-05-09 | End: 2023-07-08

## 2023-05-09 RX ORDER — BUSPIRONE HYDROCHLORIDE 15 MG/1
TABLET ORAL
Qty: 45 TABLET | Refills: 2 | Status: SHIPPED | OUTPATIENT
Start: 2023-05-09 | End: 2023-08-14 | Stop reason: SDUPTHER

## 2023-05-09 RX ORDER — LAMOTRIGINE 200 MG/1
200 TABLET ORAL DAILY
Qty: 60 TABLET | Refills: 0 | Status: SHIPPED | OUTPATIENT
Start: 2023-05-09 | End: 2023-07-26 | Stop reason: SDUPTHER

## 2023-05-09 NOTE — PROGRESS NOTES
"Outpatient Psychiatry Follow-Up Visit ([facility])    05/11/2023    Clinical Status of Patient:  Outpatient (Ambulatory)    Chief Complaint:  Ratna Hanson is a 32 y.o. female who presents today for follow-up of mood disorder and anxiety.  Met with patient.     Current Medications:     Interval History and Content of Current Session:  Patient seen and chart reviewed. Last seen on 3/13/23    Patient has a psychiatric history of: Bipolar 2, Gen Anxiety    Pt reports for follow up today stating She would like to establish care in a local area. She has been seeing [facility] regularly at [facility]. Current medication are:  Abilify 5 mg Daily  Lamictal 200 mg Daily  Ativan 0.5 mg Daily PRN  Buspar 7.5 mg in AM, 15 mg in PM    Anxiety  Patient continues to feel irritable and short tempered on a daily basis, this occurs more when she is anxious. She has been struggling with this since childhood. Takes ativan situationally maybe once / week , feel irritable daily. Feels she lashes out, yells, gets angry and walk away. Anxiety: self rates 2/ 10, feels the buspar is working, before buspar it was a 5.      Bipolar   Reports overall feeling " good and mostly stable " with medications, however, she continues to feel highs and lows, during highs reports being more reckless with spending money, ($2000 dollards about once a month) denies hiding her behavior from her , it is not causing issues. When her mood is low she feels has low energy, fatigue feels down, but can still get things done. She wishes it could be better.     History of Bipolar Diagnosis:  At age a 15, after giving birth to her 1st child she felt as though she was "going crazy" She started seeing and hearing things, rahel, felt unstable, woud have 1-2 weeks that she was reckless, not sleeping for days, then weeks of feeling depressed and not leaving her house. Would lose her job. While she was pregnant with her 2nd chid, she felt mild highs and lows.. " After daughter was born she felt paranoid, was seeing shadows in her room, or would stare at the wall. 6 months after her 2nd child she started seeing a PCP was placed on Seroquel, diagnosed with Bipolar I, then switched to outpatient psych was started on Lamictal, Ativan PRN and Abilify. Has been mostly stable since.     Pt appears Happy, well dressed, well groomed.    Denies adverse effects from medication    Sleep: reports her 4 year old waking in the middle of the night, wakes her up feels tired all the time.  snores, no issues falling sleep, or staying asleep, wakes sweating.  Nightmares frequently at least once per week, aways different ( sometimes something shes heard on the news, anxious dreams, vivid dreams her entire life.    Appetite: Normal.    Denies SI/HI/AVH.     Pt reports taking medications as prescribed and behaving appropriately during interview today.      Review of Systems   PSYCHIATRIC: Pertinant items are noted in the narrative.    Past Medication Trials:    Past Medical, Family and Social History: The patient's past medical, family and social history have been reviewed and updated as appropriate within the electronic medical record - see encounter notes.    Compliance: yes    Side effects: None    Risk Parameters:  Patient reports no suicidal ideation  Patient reports no homicidal ideation  Patient reports no self-injurious behavior  Patient reports no violent behavior    Exam (detailed: at least 9 elements; comprehensive: all 15 elements)   Constitutional  Vitals:  Most recent vital signs, dated less than 90 days prior to this appointment, were reviewed.   Vitals:    05/09/23 0807   BP: 107/71   Pulse: 68   Temp: 97.6 °F (36.4 °C)   SpO2: 100%        General:  unremarkable, age appropriate     Musculoskeletal  Muscle Strength/Tone:  no spasicity, no rigidity, no cogwheeling, no flaccidity, no paratonia, no dyskinesia, no dystonia, no tremor, no tic, no choreoathetosis, no atrophy    Gait & Station:  non-ataxic     Psychiatric  Speech:  no latency; no press   Mood & Affect:  steady, euthymic  congruent and appropriate   Thought Process:  normal and logical   Associations:  intact   Thought Content:  normal, no suicidality, no homicidality, delusions, or paranoia   Insight:  intact, has awareness of illness   Judgement: behavior is adequate to circumstances, age appropriate   Orientation:  grossly intact   Memory: intact for content of interview   Language: grossly intact   Attention Span & Concentration:  able to focus   Fund of Knowledge:  intact and appropriate to age and level of education     Assessment and Diagnosis   Status/Progress: Based on the examination today, the patient's problem(s) is/are adequately but not ideally controlled.  New problems have been presented today.   Co-morbidities, Diagnostic uncertainty, and Lack of compliance are complicating management of the primary condition.  There are no active rule-out diagnoses for this patient at this time.     General Impression: Patient reports symptoms are well controlled but not ideal. She continues to have mild cycling of hypomania and depression. Lamictal currently at 200 mg. Will increase Abilify to 7 mg Daily, and reassess in 2-4 weeks. Labs reviewed from 03/23 WNL. AIMS=0 .        ICD-10-CM ICD-9-CM    1. Bipolar 2 disorder  F31.81 296.89 ARIPiprazole (ABILIFY) 2 MG Tab      ARIPiprazole (ABILIFY) 5 MG Tab      lamoTRIgine (LAMICTAL) 200 MG tablet      2. DUNCAN (generalized anxiety disorder)  F41.1 300.02 busPIRone (BUSPAR) 15 MG tablet      LORazepam (ATIVAN) 0.5 MG tablet          Intervention/Counseling/Treatment Plan   Medication Management: The risks and benefits of medication were discussed with the patient.  Increase Abilify to 7 mg Daily.  Discussed diagnosis, risk and benefits of proposed treatment above vs alternative treatment vs no treatment, and potential side effects of these treatments, and the inherent  unpredictability of individual responses to these treatments. The patient expresses understanding and gives informed consent to pursue treatment at this time, believing that the potential benefits outweigh the potential risks. Patient has no other questions. Risks/adverse effects at this time include but are not limited to: GI side effects, sexual dysfunction, activation vs sedation, triggering of suicidal ideation, and serotonin syndrome.   Patient voices understanding and agreement with this plan  Provided crisis numbers  Encouraged patient to keep future appointments  Instruct patient to call or message with questions  In the event of an emergency, including suicidal ideation, patient was advised to go to the emergency room      Return to Clinic: 2 weeks      Laura Ritter DNP, PMKARELP, FNP

## 2023-05-25 ENCOUNTER — PATIENT MESSAGE (OUTPATIENT)
Dept: PSYCHOLOGY | Facility: CLINIC | Age: 33
End: 2023-05-25
Payer: COMMERCIAL

## 2023-05-25 DIAGNOSIS — F31.81 BIPOLAR 2 DISORDER: Primary | ICD-10-CM

## 2023-05-30 NOTE — TELEPHONE ENCOUNTER
Patient called on 05/19/23 regarding medication changes. She was informed Abilify does not typically cause depression but she stated that she would like to go back down to 5 mg daily. We discussed potentially increasing lamotrigine. I told her I would call her back after reviewing her case.    Patient called on 05/29/23, voicemail left and again on 05/31/23, voicemail left. Will attempt sending message through patient portal.

## 2023-05-31 ENCOUNTER — PATIENT MESSAGE (OUTPATIENT)
Dept: PSYCHOLOGY | Facility: CLINIC | Age: 33
End: 2023-05-31
Payer: COMMERCIAL

## 2023-05-31 RX ORDER — LAMOTRIGINE 25 MG/1
TABLET ORAL
Qty: 60 TABLET | Refills: 1 | Status: CANCELLED | OUTPATIENT
Start: 2023-05-31

## 2023-06-12 ENCOUNTER — OFFICE VISIT (OUTPATIENT)
Dept: PSYCHOLOGY | Facility: CLINIC | Age: 33
End: 2023-06-12
Payer: COMMERCIAL

## 2023-06-12 VITALS
SYSTOLIC BLOOD PRESSURE: 110 MMHG | HEART RATE: 80 BPM | OXYGEN SATURATION: 100 % | TEMPERATURE: 98 F | DIASTOLIC BLOOD PRESSURE: 73 MMHG

## 2023-06-12 DIAGNOSIS — F31.81 BIPOLAR 2 DISORDER: Primary | ICD-10-CM

## 2023-06-12 DIAGNOSIS — F41.1 GAD (GENERALIZED ANXIETY DISORDER): ICD-10-CM

## 2023-06-12 PROCEDURE — 1159F MED LIST DOCD IN RCRD: CPT | Mod: CPTII,S$GLB,, | Performed by: NURSE PRACTITIONER

## 2023-06-12 PROCEDURE — 3044F HG A1C LEVEL LT 7.0%: CPT | Mod: CPTII,S$GLB,, | Performed by: NURSE PRACTITIONER

## 2023-06-12 PROCEDURE — 99214 PR OFFICE/OUTPT VISIT, EST, LEVL IV, 30-39 MIN: ICD-10-PCS | Mod: S$GLB,,, | Performed by: NURSE PRACTITIONER

## 2023-06-12 PROCEDURE — 99999 PR PBB SHADOW E&M-EST. PATIENT-LVL III: CPT | Mod: PBBFAC,,, | Performed by: NURSE PRACTITIONER

## 2023-06-12 PROCEDURE — 1159F PR MEDICATION LIST DOCUMENTED IN MEDICAL RECORD: ICD-10-PCS | Mod: CPTII,S$GLB,, | Performed by: NURSE PRACTITIONER

## 2023-06-12 PROCEDURE — 99214 OFFICE O/P EST MOD 30 MIN: CPT | Mod: S$GLB,,, | Performed by: NURSE PRACTITIONER

## 2023-06-12 PROCEDURE — 3074F SYST BP LT 130 MM HG: CPT | Mod: CPTII,S$GLB,, | Performed by: NURSE PRACTITIONER

## 2023-06-12 PROCEDURE — 3078F PR MOST RECENT DIASTOLIC BLOOD PRESSURE < 80 MM HG: ICD-10-PCS | Mod: CPTII,S$GLB,, | Performed by: NURSE PRACTITIONER

## 2023-06-12 PROCEDURE — 3078F DIAST BP <80 MM HG: CPT | Mod: CPTII,S$GLB,, | Performed by: NURSE PRACTITIONER

## 2023-06-12 PROCEDURE — 3044F PR MOST RECENT HEMOGLOBIN A1C LEVEL <7.0%: ICD-10-PCS | Mod: CPTII,S$GLB,, | Performed by: NURSE PRACTITIONER

## 2023-06-12 PROCEDURE — 99999 PR PBB SHADOW E&M-EST. PATIENT-LVL III: ICD-10-PCS | Mod: PBBFAC,,, | Performed by: NURSE PRACTITIONER

## 2023-06-12 PROCEDURE — 3074F PR MOST RECENT SYSTOLIC BLOOD PRESSURE < 130 MM HG: ICD-10-PCS | Mod: CPTII,S$GLB,, | Performed by: NURSE PRACTITIONER

## 2023-06-12 NOTE — PROGRESS NOTES
Outpatient Psychiatry Follow-Up Visit ([facility])    06/12/2023    Clinical Status of Patient:  Outpatient (Ambulatory)    Chief Complaint:  Ratna Hanson is a 32 y.o. female who presents today for follow-up of mood disorder and anxiety.  Met with patient.     Current Medications:   Abilify 5 mg Daily  Lamictal 200 mg Daily  Ativan 0.5 mg Daily PRN  Buspar 7.5 mg in AM, 15 mg in PM    Interval History and Content of Current Session:  Patient seen and chart reviewed. Last seen on 05/9/23    Patient reports feeling low for for about 1.5 weeks after last visit. She is concerned that this was from starting the additional 2 mg dose of Abilify. She discontinued the medication but reports she is willing to try it again after I informed her the dip in mood as unlikely caused by the medication. She is aware that the Lamictal dose is high and may not be safe increasing.     Denies SI/HI/AVH. Denies adverse effects from medication  Pt reports taking medications as prescribed and behaving appropriately during interview today.    Pt appears:  Well, dressed to go to the gym    Mood:   Calm, happy    Sleep:   normal    Appetite:  normal      Review of Systems   PSYCHIATRIC: Pertinant items are noted in the narrative.    Past Medication Trials:    Past Medical, Family and Social History: The patient's past medical, family and social history have been reviewed and updated as appropriate within the electronic medical record - see encounter notes.    Compliance: yes    Side effects: None    Risk Parameters:  Patient reports no suicidal ideation  Patient reports no homicidal ideation  Patient reports no self-injurious behavior  Patient reports no violent behavior    Exam (detailed: at least 9 elements; comprehensive: all 15 elements)   Constitutional  Vitals:  Most recent vital signs, dated less than 90 days prior to this appointment, were reviewed.   Vitals:    06/12/23 0919   BP: 110/73   Pulse: 80   Temp: 98.2 °F (36.8 °C)   SpO2:  100%        General:  unremarkable, age appropriate     Musculoskeletal  Muscle Strength/Tone:  no spasicity, no rigidity, no cogwheeling, no flaccidity, no paratonia, no dyskinesia, no dystonia, no tremor, no tic, no choreoathetosis, no atrophy   Gait & Station:  non-ataxic     Psychiatric  Speech:  no latency; no press   Mood & Affect:  steady, euthymic  congruent and appropriate   Thought Process:  normal and logical   Associations:  intact   Thought Content:  normal, no suicidality, no homicidality, delusions, or paranoia   Insight:  intact, has awareness of illness   Judgement: behavior is adequate to circumstances, age appropriate   Orientation:  grossly intact   Memory: intact for content of interview   Language: grossly intact   Attention Span & Concentration:  able to focus   Fund of Knowledge:  intact and appropriate to age and level of education     Assessment and Diagnosis   Status/Progress: Based on the examination today, the patient's problem(s) is/are adequately but not ideally controlled.  New problems have been presented today.   Co-morbidities, Diagnostic uncertainty, and Lack of compliance are complicating management of the primary condition.  There are no active rule-out diagnoses for this patient at this time.     General Impression: Patient reports symptoms are well controlled but not ideal. She continues to have mild cycling of hypomania and depression. Lamictal currently at 200 mg. Will increase Abilify to 7 mg Daily, and reassess in 2-4 weeks. Labs reviewed from 03/23 WNL. AIMS=0 .     Continue with previous plan from 5/9/23, will follow up in 2-4 weeks       ICD-10-CM ICD-9-CM    1. Bipolar 2 disorder  F31.81 296.89       2. DUNCAN (generalized anxiety disorder)  F41.1 300.02             Intervention/Counseling/Treatment Plan   Medication Management: The risks and benefits of medication were discussed with the patient.  Increase Abilify to 7 mg Daily.  Discussed diagnosis, risk and benefits of  proposed treatment above vs alternative treatment vs no treatment, and potential side effects of these treatments, and the inherent unpredictability of individual responses to these treatments. The patient expresses understanding and gives informed consent to pursue treatment at this time, believing that the potential benefits outweigh the potential risks. Patient has no other questions. Risks/adverse effects at this time include but are not limited to: GI side effects, sexual dysfunction, activation vs sedation, triggering of suicidal ideation, and serotonin syndrome.   Patient voices understanding and agreement with this plan  Provided crisis numbers  Encouraged patient to keep future appointments  Instruct patient to call or message with questions  In the event of an emergency, including suicidal ideation, patient was advised to go to the emergency room      Return to Clinic: 2 weeks      Laura Ritter DNP, PMKARELP, FNP

## 2023-06-14 ENCOUNTER — TELEPHONE (OUTPATIENT)
Dept: GASTROENTEROLOGY | Facility: CLINIC | Age: 33
End: 2023-06-14
Payer: COMMERCIAL

## 2023-06-14 NOTE — TELEPHONE ENCOUNTER
----- Message from Jeff Ramos MD sent at 6/8/2023 11:05 AM CDT -----  Path reviewed. No cause of dysphagia noted. F/u as needed with Elizabeth

## 2023-07-26 DIAGNOSIS — F31.81 BIPOLAR 2 DISORDER: ICD-10-CM

## 2023-07-26 RX ORDER — ARIPIPRAZOLE 5 MG/1
5 TABLET ORAL DAILY
Qty: 60 TABLET | Refills: 0 | Status: SHIPPED | OUTPATIENT
Start: 2023-07-26 | End: 2023-08-14

## 2023-07-26 RX ORDER — LAMOTRIGINE 200 MG/1
200 TABLET ORAL DAILY
Qty: 60 TABLET | Refills: 0 | Status: SHIPPED | OUTPATIENT
Start: 2023-07-26 | End: 2023-08-14 | Stop reason: SDUPTHER

## 2023-08-14 ENCOUNTER — OFFICE VISIT (OUTPATIENT)
Dept: PSYCHOLOGY | Facility: CLINIC | Age: 33
End: 2023-08-14
Payer: COMMERCIAL

## 2023-08-14 VITALS
DIASTOLIC BLOOD PRESSURE: 73 MMHG | OXYGEN SATURATION: 100 % | SYSTOLIC BLOOD PRESSURE: 116 MMHG | TEMPERATURE: 98 F | HEART RATE: 76 BPM

## 2023-08-14 DIAGNOSIS — F31.81 BIPOLAR 2 DISORDER: Primary | ICD-10-CM

## 2023-08-14 DIAGNOSIS — F41.1 GAD (GENERALIZED ANXIETY DISORDER): ICD-10-CM

## 2023-08-14 PROCEDURE — 3074F SYST BP LT 130 MM HG: CPT | Mod: CPTII,S$GLB,, | Performed by: NURSE PRACTITIONER

## 2023-08-14 PROCEDURE — 3078F DIAST BP <80 MM HG: CPT | Mod: CPTII,S$GLB,, | Performed by: NURSE PRACTITIONER

## 2023-08-14 PROCEDURE — 99999 PR PBB SHADOW E&M-EST. PATIENT-LVL III: ICD-10-PCS | Mod: PBBFAC,,, | Performed by: NURSE PRACTITIONER

## 2023-08-14 PROCEDURE — 99214 OFFICE O/P EST MOD 30 MIN: CPT | Mod: S$GLB,,, | Performed by: NURSE PRACTITIONER

## 2023-08-14 PROCEDURE — 3074F PR MOST RECENT SYSTOLIC BLOOD PRESSURE < 130 MM HG: ICD-10-PCS | Mod: CPTII,S$GLB,, | Performed by: NURSE PRACTITIONER

## 2023-08-14 PROCEDURE — 99214 PR OFFICE/OUTPT VISIT, EST, LEVL IV, 30-39 MIN: ICD-10-PCS | Mod: S$GLB,,, | Performed by: NURSE PRACTITIONER

## 2023-08-14 PROCEDURE — 1159F MED LIST DOCD IN RCRD: CPT | Mod: CPTII,S$GLB,, | Performed by: NURSE PRACTITIONER

## 2023-08-14 PROCEDURE — 1159F PR MEDICATION LIST DOCUMENTED IN MEDICAL RECORD: ICD-10-PCS | Mod: CPTII,S$GLB,, | Performed by: NURSE PRACTITIONER

## 2023-08-14 PROCEDURE — 3044F PR MOST RECENT HEMOGLOBIN A1C LEVEL <7.0%: ICD-10-PCS | Mod: CPTII,S$GLB,, | Performed by: NURSE PRACTITIONER

## 2023-08-14 PROCEDURE — 3044F HG A1C LEVEL LT 7.0%: CPT | Mod: CPTII,S$GLB,, | Performed by: NURSE PRACTITIONER

## 2023-08-14 PROCEDURE — 99999 PR PBB SHADOW E&M-EST. PATIENT-LVL III: CPT | Mod: PBBFAC,,, | Performed by: NURSE PRACTITIONER

## 2023-08-14 PROCEDURE — 3078F PR MOST RECENT DIASTOLIC BLOOD PRESSURE < 80 MM HG: ICD-10-PCS | Mod: CPTII,S$GLB,, | Performed by: NURSE PRACTITIONER

## 2023-08-14 RX ORDER — ARIPIPRAZOLE 10 MG/1
10 TABLET ORAL DAILY
Qty: 90 TABLET | Refills: 1 | Status: SHIPPED | OUTPATIENT
Start: 2023-08-14 | End: 2023-12-19 | Stop reason: SDUPTHER

## 2023-08-14 RX ORDER — BUSPIRONE HYDROCHLORIDE 15 MG/1
TABLET ORAL
Qty: 45 TABLET | Refills: 4 | Status: SHIPPED | OUTPATIENT
Start: 2023-08-14 | End: 2024-01-16

## 2023-08-14 RX ORDER — LAMOTRIGINE 200 MG/1
200 TABLET ORAL DAILY
Qty: 90 TABLET | Refills: 1 | Status: SHIPPED | OUTPATIENT
Start: 2023-08-14 | End: 2023-12-22 | Stop reason: SDUPTHER

## 2023-08-14 NOTE — PROGRESS NOTES
[facility]    08/14/2023    Clinical Status of Patient:  Outpatient (Ambulatory)    Chief Complaint:  Ratna Hanson is a 32 y.o. female who presents today for follow-up of mood disorder and anxiety.  Met with patient.     Current Medications:   Abilify 7 mg Daily  Lamictal 200 mg Daily  Ativan 0.5 mg Daily PRN  Buspar 5 mg in AM, 10 mg in PM    Interval History and Content of Current Session:  Patient seen and chart reviewed. Last seen on 06/12/23    Patient reports that she is sleeping well and is not cycling as much, when she does its more depression than elevated moods. Last time was 2 weeks ago. She feels that the Abilify has been working very well at the 7 mg dose. She reports she has started cutting calories and limiting her sugar intake and eating less in general over this past 2 months but she reports gaining a few lbs. Deneis fatigue or depression from medication. Continues take take lamictal and is doing well. She reports only using Ativan one time ,yesterday after spending the day with inlaws, Denies panic attacks or increase in generalzied anxiety. Denies side effects outside of possible meaghan gain. Will monitor and weigh herself 1X/week.      Denies SI/HI/AVH. Denies adverse effects from medication  Pt reports taking medications as prescribed and behaving appropriately during interview today.    Pt appears:  Well.    Mood:   Calm, happy    Sleep:   Normal, sleeps 6-8 hrs night. Wakes at 5 AM in the AM.    Appetite:  Normal, watching and mindful of diet    Self Rates Depression: 2/10  Self Rated Anxiety:  3 /10    AIMS:    0    Review of Systems   PSYCHIATRIC: Pertinant items are noted in the narrative.    Past Medication Trials:    Past Medical, Family and Social History: The patient's past medical, family and social history have been reviewed and updated as appropriate within the electronic medical record - see encounter notes.    Compliance: yes    Side effects:  None    Risk Parameters:  Patient reports no suicidal ideation  Patient reports no homicidal ideation  Patient reports no self-injurious behavior  Patient reports no violent behavior    Exam (detailed: at least 9 elements; comprehensive: all 15 elements)   Constitutional  Vitals:  Most recent vital signs, dated less than 90 days prior to this appointment, were reviewed.   Vitals:    08/14/23 1122   BP: 116/73   Pulse: 76   Temp: 98.2 °F (36.8 °C)   SpO2: 100%          General:  unremarkable, age appropriate     Musculoskeletal  Muscle Strength/Tone:  no spasicity, no rigidity, no cogwheeling, no flaccidity, no paratonia, no dyskinesia, no dystonia, no tremor, no tic, no choreoathetosis, no atrophy   Gait & Station:  non-ataxic     Psychiatric  Speech:  no latency; no press   Mood & Affect:  steady, euthymic  congruent and appropriate   Thought Process:  normal and logical   Associations:  intact   Thought Content:  normal, no suicidality, no homicidality, delusions, or paranoia   Insight:  intact, has awareness of illness   Judgement: behavior is adequate to circumstances, age appropriate   Orientation:  grossly intact   Memory: intact for content of interview   Language: grossly intact   Attention Span & Concentration:  able to focus   Fund of Knowledge:  intact and appropriate to age and level of education     Assessment and Diagnosis   Status/Progress: Based on the examination today, the patient's problem(s) is/are adequately but not ideally controlled.  New problems have been presented today.   Co-morbidities, Diagnostic uncertainty, and Lack of compliance are complicating management of the primary condition.  There are no active rule-out diagnoses for this patient at this time.     General Impression: Patient reports symptoms are well controlled but not ideal. She continues to have minimal cycling with some depression. Lamictal currently at 200 mg. Will increase Abilify to 10 mg Daily, and reassess in 8 weeks.  Labs ordered to be drawn before next visit. AIMS=0 .         ICD-10-CM ICD-9-CM    1. Bipolar 2 disorder  F31.81 296.89 ARIPiprazole (ABILIFY) 10 MG Tab      lamoTRIgine (LAMICTAL) 200 MG tablet      LIPID PANEL      COMPREHENSIVE METABOLIC PANEL      HEMOGLOBIN A1C      2. DUNCAN (generalized anxiety disorder)  F41.1 300.02 busPIRone (BUSPAR) 15 MG tablet          Intervention/Counseling/Treatment Plan   Medication Management: The risks and benefits of medication were discussed with the patient.  Labs, Diagnostic Studies: order Lipid Panel, CMP, Hem A1C  for next visit  Continue Lamictal 200 mg Daily  Increase Abilify to 10 mg Daily  Continue Ativan 0.5 mg Daily PRN  Continue Buspar 7.5 mg in AM, 15 mg in PM  Discussed diagnosis, risk and benefits of proposed treatment above vs alternative treatment vs no treatment, and potential side effects of these treatments, and the inherent unpredictability of individual responses to these treatments. The patient expresses understanding and gives informed consent to pursue treatment at this time, believing that the potential benefits outweigh the potential risks. Patient has no other questions. Risks/adverse effects at this time include but are not limited to: GI side effects, sexual dysfunction, activation vs sedation, triggering of suicidal ideation, and serotonin syndrome.   Patient voices understanding and agreement with this plan  Provided crisis numbers  Encouraged patient to keep future appointments  Instruct patient to call or message with questions  In the event of an emergency, including suicidal ideation, patient was advised to go to the emergency room      Return to Clinic: 2 months      Laura Ritter DNP, PMKARELP, FNP

## 2023-09-18 ENCOUNTER — PATIENT MESSAGE (OUTPATIENT)
Dept: PRIMARY CARE CLINIC | Facility: CLINIC | Age: 33
End: 2023-09-18
Payer: COMMERCIAL

## 2023-10-18 ENCOUNTER — PATIENT MESSAGE (OUTPATIENT)
Dept: CARDIOLOGY | Facility: CLINIC | Age: 33
End: 2023-10-18
Payer: COMMERCIAL

## 2023-12-19 ENCOUNTER — OFFICE VISIT (OUTPATIENT)
Dept: PSYCHOLOGY | Facility: CLINIC | Age: 33
End: 2023-12-19
Payer: COMMERCIAL

## 2023-12-19 VITALS
HEART RATE: 76 BPM | TEMPERATURE: 97 F | OXYGEN SATURATION: 100 % | DIASTOLIC BLOOD PRESSURE: 78 MMHG | SYSTOLIC BLOOD PRESSURE: 116 MMHG

## 2023-12-19 DIAGNOSIS — F41.1 GAD (GENERALIZED ANXIETY DISORDER): ICD-10-CM

## 2023-12-19 DIAGNOSIS — F31.81 BIPOLAR 2 DISORDER: Primary | ICD-10-CM

## 2023-12-19 PROCEDURE — 3074F PR MOST RECENT SYSTOLIC BLOOD PRESSURE < 130 MM HG: ICD-10-PCS | Mod: CPTII,S$GLB,, | Performed by: NURSE PRACTITIONER

## 2023-12-19 PROCEDURE — 99999 PR PBB SHADOW E&M-EST. PATIENT-LVL II: ICD-10-PCS | Mod: PBBFAC,,, | Performed by: NURSE PRACTITIONER

## 2023-12-19 PROCEDURE — 99215 PR OFFICE/OUTPT VISIT, EST, LEVL V, 40-54 MIN: ICD-10-PCS | Mod: S$GLB,,, | Performed by: NURSE PRACTITIONER

## 2023-12-19 PROCEDURE — 3078F DIAST BP <80 MM HG: CPT | Mod: CPTII,S$GLB,, | Performed by: NURSE PRACTITIONER

## 2023-12-19 PROCEDURE — 3044F HG A1C LEVEL LT 7.0%: CPT | Mod: CPTII,S$GLB,, | Performed by: NURSE PRACTITIONER

## 2023-12-19 PROCEDURE — 1159F PR MEDICATION LIST DOCUMENTED IN MEDICAL RECORD: ICD-10-PCS | Mod: CPTII,S$GLB,, | Performed by: NURSE PRACTITIONER

## 2023-12-19 PROCEDURE — 99999 PR PBB SHADOW E&M-EST. PATIENT-LVL II: CPT | Mod: PBBFAC,,, | Performed by: NURSE PRACTITIONER

## 2023-12-19 PROCEDURE — 99215 OFFICE O/P EST HI 40 MIN: CPT | Mod: S$GLB,,, | Performed by: NURSE PRACTITIONER

## 2023-12-19 PROCEDURE — 3044F PR MOST RECENT HEMOGLOBIN A1C LEVEL <7.0%: ICD-10-PCS | Mod: CPTII,S$GLB,, | Performed by: NURSE PRACTITIONER

## 2023-12-19 PROCEDURE — 3074F SYST BP LT 130 MM HG: CPT | Mod: CPTII,S$GLB,, | Performed by: NURSE PRACTITIONER

## 2023-12-19 PROCEDURE — 1159F MED LIST DOCD IN RCRD: CPT | Mod: CPTII,S$GLB,, | Performed by: NURSE PRACTITIONER

## 2023-12-19 PROCEDURE — 3078F PR MOST RECENT DIASTOLIC BLOOD PRESSURE < 80 MM HG: ICD-10-PCS | Mod: CPTII,S$GLB,, | Performed by: NURSE PRACTITIONER

## 2023-12-19 RX ORDER — ARIPIPRAZOLE 10 MG/1
10 TABLET ORAL DAILY
Qty: 90 TABLET | Refills: 1 | Status: SHIPPED | OUTPATIENT
Start: 2023-12-19 | End: 2024-01-24 | Stop reason: SDUPTHER

## 2023-12-19 RX ORDER — LORAZEPAM 0.5 MG/1
TABLET ORAL
Qty: 10 TABLET | Refills: 0 | Status: SHIPPED | OUTPATIENT
Start: 2023-12-19 | End: 2024-03-04 | Stop reason: SDUPTHER

## 2023-12-19 NOTE — PROGRESS NOTES
Outpatient Psychiatry Follow-Up Visit ([facility])    12/19/2023    Clinical Status of Patient:  Outpatient (Ambulatory)    Chief Complaint:  Ratna Hanson is a 33 y.o. female who presents today for follow-up of mood disorder and anxiety.  Met with patient.     Current Medications:   Abilify to 10 mg Daily  Lamictal 200 mg Daily  Ativan 0.5 mg Daily PRN  Buspar 5 mg in AM, 10 mg in PM    Past medication trials:   Lexapro- suicidal thoughts age 16, during Post partum depression.    Interval History and Content of Current Session:  Patient seen and chart reviewed. Last seen on 08/14/23    At the last visit Abilify was increased to 10 mg Daily    Reports her mother amanda has was diagnosed with stage 4 metastatic [place]. Reports anxiety has been increased lately. She is now out of her Ativan. She reports Buspar has been helping a lot with her anxiety but at times causes nausea.  Over the past few months she reports feeling mostly stable but reports mild mood cycling.   Reports depression lasting 3 days at at time, feels unmotivated, overwhelmed, stays in bed, stops doing laundry and other house chores. When she has rahel she reports inability to sleep past 2 am and wanting to do laundry in the middle of then ight and feeling more elevated. In general she reports   Reports reading something about OCD and feeling as thought she has it. She reports having obsessive thoughts about hurting her child or other peoples children, she obsessive oves   Denies SI/HI/AVH. Denies adverse effects from medication  Pt reports taking medications as prescribed and behaving appropriately during interview today.    Pt appears:  Well.    Mood:  Calm, happy    Sleep:  Normal, sleeps 6-8 hrs night. Wakes at 5 AM in the AM.    Appetite:  Normal, watching and mindful of diet    Self Rates Depression: 2/10  Self Rated Anxiety:  3 /10    AIMS:    0    Review of Systems   PSYCHIATRIC: Pertinant items are noted in the narrative.    Past Medication  Trials:    Past Medical, Family and Social History: The patient's past medical, family and social history have been reviewed and updated as appropriate within the electronic medical record - see encounter notes.    Compliance: yes    Side effects: None    Risk Parameters:  Patient reports no suicidal ideation  Patient reports no homicidal ideation  Patient reports no self-injurious behavior  Patient reports no violent behavior    Exam (detailed: at least 9 elements; comprehensive: all 15 elements)   Constitutional  Vitals:  Most recent vital signs, dated less than 90 days prior to this appointment, were reviewed.   Vitals:    12/19/23 1130   BP: 116/78   Pulse: 76   Temp: 96.6 °F (35.9 °C)   SpO2: 100%          General:  unremarkable, age appropriate     Musculoskeletal  Muscle Strength/Tone:  no spasicity, no rigidity, no cogwheeling, no flaccidity, no paratonia, no dyskinesia, no dystonia, no tremor, no tic, no choreoathetosis, no atrophy   Gait & Station:  non-ataxic     Psychiatric  Speech:  no latency; no press   Mood & Affect:  steady, euthymic  congruent and appropriate   Thought Process:  normal and logical   Associations:  intact   Thought Content:  normal, no suicidality, no homicidality, delusions, or paranoia   Insight:  intact, has awareness of illness   Judgement: behavior is adequate to circumstances, age appropriate   Orientation:  grossly intact   Memory: intact for content of interview   Language: grossly intact   Attention Span & Concentration:  able to focus   Fund of Knowledge:  intact and appropriate to age and level of education     Assessment and Diagnosis   Status/Progress: Based on the examination today, the patient's problem(s) is/are adequately but not ideally controlled.  New problems have been presented today.   Co-morbidities, Diagnostic uncertainty, and Lack of compliance are complicating management of the primary condition.  There are no active rule-out diagnoses for this patient at  this time.     General Impression: Patient reports symptoms are well controlled but not ideal. She continues to have minimal cycling with some depression. Lamictal currently at 200 mg. Will increase Abilify to 10 mg Daily, and reassess in 8 weeks. Labs ordered to be drawn before next visit. AIMS=0 .   Presents 12/19/23- continues mild mood cycling. Plan to increase Lamictal after drug levels come back.       ICD-10-CM ICD-9-CM    1. Bipolar 2 disorder  F31.81 296.89 ARIPiprazole (ABILIFY) 10 MG Tab      LAMOTRIGINE LEVEL      2. DUNCAN (generalized anxiety disorder)  F41.1 300.02 LORazepam (ATIVAN) 0.5 MG tablet          Intervention/Counseling/Treatment Plan   Medication Management: The risks and benefits of medication were discussed with the patient.  Labs, Diagnostic Studies: order Lipid Panel, CMP, Hem A1C  for next visit  Increase Lamictal   Lamictal Level, to be drawn 12 hrs after last dose.  Increase to 225 mg Daily for 2 weeks  Then Increase Lamictal 250 mg daily   Continue Abilify to 10 mg Daily  Continue Ativan 0.5 mg Daily PRN  Checked LA  and no irregularities were noted.  Last refill picked up on 05/09/23  Provided with 1 refills, starting on 12/19/23   Continue Buspar 7.5 mg in AM, at noon and 15 mg in PM  Discussed diagnosis, risk and benefits of proposed treatment above vs alternative treatment vs no treatment, and potential side effects of these treatments, and the inherent unpredictability of individual responses to these treatments. The patient expresses understanding and gives informed consent to pursue treatment at this time, believing that the potential benefits outweigh the potential risks. Patient has no other questions. Risks/adverse effects at this time include but are not limited to: GI side effects, sexual dysfunction, activation vs sedation, triggering of suicidal ideation, and serotonin syndrome.   Patient voices understanding and agreement with this plan  Provided crisis  numbers  Encouraged patient to keep future appointments  Instruct patient to call or message with questions  In the event of an emergency, including suicidal ideation, patient was advised to go to the emergency room      Return to Clinic: 1 month      Laura Ritter DNP, PMKARELP, FNP

## 2023-12-22 ENCOUNTER — PATIENT MESSAGE (OUTPATIENT)
Dept: PSYCHOLOGY | Facility: CLINIC | Age: 33
End: 2023-12-22
Payer: COMMERCIAL

## 2023-12-22 DIAGNOSIS — F31.81 BIPOLAR 2 DISORDER: ICD-10-CM

## 2023-12-22 RX ORDER — LAMOTRIGINE 200 MG/1
200 TABLET ORAL DAILY
Qty: 90 TABLET | Refills: 1 | Status: SHIPPED | OUTPATIENT
Start: 2023-12-22 | End: 2024-01-24 | Stop reason: ALTCHOICE

## 2023-12-27 ENCOUNTER — PATIENT MESSAGE (OUTPATIENT)
Dept: PSYCHOLOGY | Facility: CLINIC | Age: 33
End: 2023-12-27
Payer: COMMERCIAL

## 2024-01-15 DIAGNOSIS — F41.1 GAD (GENERALIZED ANXIETY DISORDER): ICD-10-CM

## 2024-01-16 RX ORDER — BUSPIRONE HYDROCHLORIDE 15 MG/1
TABLET ORAL
Qty: 45 TABLET | Refills: 0 | Status: SHIPPED | OUTPATIENT
Start: 2024-01-16 | End: 2024-01-24 | Stop reason: SDUPTHER

## 2024-01-24 ENCOUNTER — OFFICE VISIT (OUTPATIENT)
Dept: PSYCHOLOGY | Facility: CLINIC | Age: 34
End: 2024-01-24
Payer: COMMERCIAL

## 2024-01-24 ENCOUNTER — PATIENT MESSAGE (OUTPATIENT)
Dept: PSYCHOLOGY | Facility: CLINIC | Age: 34
End: 2024-01-24

## 2024-01-24 DIAGNOSIS — F31.81 BIPOLAR 2 DISORDER: ICD-10-CM

## 2024-01-24 DIAGNOSIS — F41.1 GAD (GENERALIZED ANXIETY DISORDER): Primary | ICD-10-CM

## 2024-01-24 PROCEDURE — 1159F MED LIST DOCD IN RCRD: CPT | Mod: CPTII,95,, | Performed by: NURSE PRACTITIONER

## 2024-01-24 PROCEDURE — 99214 OFFICE O/P EST MOD 30 MIN: CPT | Mod: 95,,, | Performed by: NURSE PRACTITIONER

## 2024-01-24 PROCEDURE — 1160F RVW MEDS BY RX/DR IN RCRD: CPT | Mod: CPTII,95,, | Performed by: NURSE PRACTITIONER

## 2024-01-24 RX ORDER — LAMOTRIGINE 250 MG/1
250 TABLET, EXTENDED RELEASE ORAL DAILY
Qty: 30 TABLET | Refills: 3 | Status: SHIPPED | OUTPATIENT
Start: 2024-01-24 | End: 2024-03-04 | Stop reason: SDUPTHER

## 2024-01-24 RX ORDER — ARIPIPRAZOLE 10 MG/1
10 TABLET ORAL DAILY
Qty: 90 TABLET | Refills: 1 | Status: SHIPPED | OUTPATIENT
Start: 2024-01-24 | End: 2024-03-04 | Stop reason: SDUPTHER

## 2024-01-24 RX ORDER — SERTRALINE HYDROCHLORIDE 50 MG/1
50 TABLET, FILM COATED ORAL DAILY
Qty: 30 TABLET | Refills: 1 | Status: SHIPPED | OUTPATIENT
Start: 2024-01-24 | End: 2024-03-04 | Stop reason: ALTCHOICE

## 2024-01-24 RX ORDER — BUSPIRONE HYDROCHLORIDE 15 MG/1
TABLET ORAL
Qty: 45 TABLET | Refills: 0 | Status: SHIPPED | OUTPATIENT
Start: 2024-01-24 | End: 2024-03-04 | Stop reason: SDUPTHER

## 2024-01-24 NOTE — PROGRESS NOTES
Outpatient Psychiatry Follow-Up Visit ([facility])    01/24/2024    Clinical Status of Patient:  Outpatient (Ambulatory)    Chief Complaint:  Ratna Hanson is a 33 y.o. female who presents today for follow-up of mood disorder and anxiety.  Met with patient.     Current Medications:   Abilify to 10 mg Daily  Lamictal 250 mg Daily  Ativan 0.5 mg Daily PRN  Buspar 5 mg in AM, 10 mg in PM    Past medication trials:   Lexapro- suicidal thoughts age 16, during Post partum depression.    Interval History and Content of Current Session:  Patient seen and chart reviewed. Last seen on 12/19/23      At last visit Lamictal was increased to 250 mg Daily due to continued mood cycling, the patient was also concerned about weight gain over the past 1-2 years with Abilify. Abilify has remained at 20 mg Daily. She feels increase in Lamictal is helping a bit but still not fully certain. Reports she's not had any depressive or manic episodes this month. Continues to have anxiety daily. Patient reports waking at 3 AM each night reports some strange sleep cycle since starting Lamictal, but still feels rested in AM. Also reports her mother in law passed at the beginning of this month, this has been a more difficult time for her.    Denies SI/HI/AVH. Denies adverse effects from medication  Pt reports taking medications as prescribed and behaving appropriately during interview today.    Pt appears:  Well.    Mood:  Calm, happy    Sleep:  Normal, sleeps 6-8 hrs night. Wakes at 3 AM each night.    Appetite:  Normal, watching and mindful of diet    Self Rates Depression: 2/10  Self Rated Anxiety:  3 /10    AIMS:    0    Review of Systems   PSYCHIATRIC: Pertinant items are noted in the narrative.    Past Medication Trials:    Past Medical, Family and Social History: The patient's past medical, family and social history have been reviewed and updated as appropriate within the electronic medical record - see encounter notes.    Compliance:  yes    Side effects: None    Risk Parameters:  Patient reports no suicidal ideation  Patient reports no homicidal ideation  Patient reports no self-injurious behavior  Patient reports no violent behavior    Exam (detailed: at least 9 elements; comprehensive: all 15 elements)   Constitutional  Vitals:  Most recent vital signs, dated less than 90 days prior to this appointment, were reviewed.   There were no vitals filed for this visit.         General:  unremarkable, age appropriate     Musculoskeletal  Muscle Strength/Tone:  no spasicity, no rigidity, no cogwheeling, no flaccidity, no paratonia, no dyskinesia, no dystonia, no tremor, no tic, no choreoathetosis, no atrophy   Gait & Station:  non-ataxic     Psychiatric  Speech:  no latency; no press   Mood & Affect:  steady, euthymic  congruent and appropriate   Thought Process:  normal and logical   Associations:  intact   Thought Content:  normal, no suicidality, no homicidality, delusions, or paranoia   Insight:  intact, has awareness of illness   Judgement: behavior is adequate to circumstances, age appropriate   Orientation:  grossly intact   Memory: intact for content of interview   Language: grossly intact   Attention Span & Concentration:  able to focus   Fund of Knowledge:  intact and appropriate to age and level of education     Assessment and Diagnosis   Status/Progress: Based on the examination today, the patient's problem(s) is/are adequately but not ideally controlled.  New problems have been presented today.   Co-morbidities, Diagnostic uncertainty, and Lack of compliance are complicating management of the primary condition.  There are no active rule-out diagnoses for this patient at this time.     General Impression:   Presents 5/9/23- Patient reports symptoms are well controlled but not ideal. She continues to have mild cycling of hypomania and depression. Lamictal currently at 200 mg. Will increase Abilify to 7 mg Daily, and reassess in 2-4 weeks.  Labs reviewed from 03/23 WNL. AIMS=0   Present 6/12/23- Continue with previous plan from 5/9/23, will follow up in 2-4 weeks  .   Presents 8/14/23- Patient reports symptoms are well controlled but not ideal. She continues to have minimal cycling with some depression. Lamictal currently at 200 mg. Will increase Abilify to 10 mg Daily, and reassess in 8 weeks. Labs ordered to be drawn before next visit. AIMS=0 .   Presents 12/19/23- continues mild mood cycling. Plan to increase Lamictal after drug levels come back. Lamictal was increased to 250 mg Daily  Presents 1/24/24- Mood cycle seems stable. Anxiety is increased, Start Zoloft 50 mg Daily.        ICD-10-CM ICD-9-CM    1. DUNCAN (generalized anxiety disorder)  F41.1 300.02 sertraline (ZOLOFT) 50 MG tablet      busPIRone (BUSPAR) 15 MG tablet      2. Bipolar 2 disorder  F31.81 296.89 ARIPiprazole (ABILIFY) 10 MG Tab      lamoTRIgine 250 mg TR24          Intervention/Counseling/Treatment Plan   Medication Management: The risks and benefits of medication were discussed with the patient.  Labs, Diagnostic Studies: order Lipid Panel, CMP, Hem A1C  for next visit  Continue Lamictal 250 mg ER Daily  Continue Abilify to 10 mg Daily  Continue Ativan 0.5 mg Daily PRN  Checked LA  and no irregularities were noted.  Not refilled  Continue Buspar 7.5 mg in AM, at noon and 15 mg in PM  Start Zoloft 50 mg Daily   Discussed diagnosis, risk and benefits of proposed treatment above vs alternative treatment vs no treatment, and potential side effects of these treatments, and the inherent unpredictability of individual responses to these treatments. The patient expresses understanding and gives informed consent to pursue treatment at this time, believing that the potential benefits outweigh the potential risks. Patient has no other questions. Risks/adverse effects at this time include but are not limited to: GI side effects, sexual dysfunction, activation vs sedation, triggering of  suicidal ideation, and serotonin syndrome.   Patient voices understanding and agreement with this plan  Provided crisis numbers  Encouraged patient to keep future appointments  Instruct patient to call or message with questions  In the event of an emergency, including suicidal ideation, patient was advised to go to the emergency room      Return to Clinic: 1 month      Laura Ritter DNP, PMKARELP, FNP

## 2024-02-20 ENCOUNTER — PATIENT MESSAGE (OUTPATIENT)
Dept: PSYCHOLOGY | Facility: CLINIC | Age: 34
End: 2024-02-20
Payer: COMMERCIAL

## 2024-03-04 ENCOUNTER — OFFICE VISIT (OUTPATIENT)
Dept: PSYCHOLOGY | Facility: CLINIC | Age: 34
End: 2024-03-04
Payer: COMMERCIAL

## 2024-03-04 ENCOUNTER — PATIENT MESSAGE (OUTPATIENT)
Dept: PSYCHOLOGY | Facility: CLINIC | Age: 34
End: 2024-03-04
Payer: COMMERCIAL

## 2024-03-04 VITALS
SYSTOLIC BLOOD PRESSURE: 126 MMHG | OXYGEN SATURATION: 100 % | TEMPERATURE: 97 F | DIASTOLIC BLOOD PRESSURE: 76 MMHG | HEART RATE: 85 BPM

## 2024-03-04 DIAGNOSIS — F31.81 BIPOLAR 2 DISORDER: ICD-10-CM

## 2024-03-04 DIAGNOSIS — F41.1 GAD (GENERALIZED ANXIETY DISORDER): ICD-10-CM

## 2024-03-04 PROCEDURE — 1159F MED LIST DOCD IN RCRD: CPT | Mod: CPTII,S$GLB,, | Performed by: NURSE PRACTITIONER

## 2024-03-04 PROCEDURE — 3078F DIAST BP <80 MM HG: CPT | Mod: CPTII,S$GLB,, | Performed by: NURSE PRACTITIONER

## 2024-03-04 PROCEDURE — 99214 OFFICE O/P EST MOD 30 MIN: CPT | Mod: S$GLB,,, | Performed by: NURSE PRACTITIONER

## 2024-03-04 PROCEDURE — 3074F SYST BP LT 130 MM HG: CPT | Mod: CPTII,S$GLB,, | Performed by: NURSE PRACTITIONER

## 2024-03-04 PROCEDURE — 99999 PR PBB SHADOW E&M-EST. PATIENT-LVL II: CPT | Mod: PBBFAC,,, | Performed by: NURSE PRACTITIONER

## 2024-03-04 RX ORDER — ARIPIPRAZOLE 10 MG/1
10 TABLET ORAL DAILY
Qty: 90 TABLET | Refills: 1 | Status: SHIPPED | OUTPATIENT
Start: 2024-03-04 | End: 2024-04-08 | Stop reason: SDUPTHER

## 2024-03-04 RX ORDER — LAMOTRIGINE 250 MG/1
250 TABLET, EXTENDED RELEASE ORAL DAILY
Qty: 90 TABLET | Refills: 1 | Status: SHIPPED | OUTPATIENT
Start: 2024-03-04 | End: 2024-04-08 | Stop reason: SDUPTHER

## 2024-03-04 RX ORDER — LORAZEPAM 0.5 MG/1
TABLET ORAL
Qty: 10 TABLET | Refills: 0 | Status: SHIPPED | OUTPATIENT
Start: 2024-03-04

## 2024-03-04 RX ORDER — BUSPIRONE HYDROCHLORIDE 15 MG/1
TABLET ORAL
Qty: 90 TABLET | Refills: 0 | Status: SHIPPED | OUTPATIENT
Start: 2024-03-04 | End: 2024-04-08 | Stop reason: SDUPTHER

## 2024-03-04 NOTE — PROGRESS NOTES
Outpatient Psychiatry Follow-Up Visit ([facility])    03/04/2024    Clinical Status of Patient:  Outpatient (Ambulatory)    Chief Complaint:  Ratna Hanson is a 33 y.o. female who presents today for follow-up of mood disorder and anxiety.  Met with patient.     Current Medications:   Abilify to 10 mg Daily  Lamictal 250 mg Daily  Ativan 0.5 mg Daily PRN  Buspar 5 mg in AM, 10 mg in PM  Zoloft 50 mg Daily    Past medication trials:   Lexapro- suicidal thoughts age 16, during Post partum depression.  Zoloft- Anxiety, Depression, sexual side effects, (possible Body aches)     Interval History and Content of Current Session:  Patient seen and chart reviewed. Last seen on 1/24/23    Patient was started on Zoloft 50 mg at the last visit. In between visits she began complaining of joint pain. She reports for 3 weeks she felt very well and was stable on all medication. Then went on vacation, and felt her mood and anxiety were worsening. Began to feel angry and depressed. She reports stopping the buspar after the vacation. Having a mood instability after   Over the past 2 week since her return she has felt severe anxiety and reports having 3 anxiety attacks.    Denies SI/HI/AVH. Denies adverse effects from medication  Pt reports taking medications as prescribed and behaving appropriately during interview today.    Pt appears:  Appropriate attire and affect    Mood:  Calm, happy    Sleep:  Improved sleeps 9-12 hrs night. Does not wake in the middle of the night any longer   with Extended release     Appetite:  Normal, watching and mindful of diet    Self Rates Depression: 3/10  Self Rated Anxiety:  6/10    AIMS:    0    Review of Systems   PSYCHIATRIC: Pertinant items are noted in the narrative.    Past Medication Trials:    Past Medical, Family and Social History: The patient's past medical, family and social history have been reviewed and updated as appropriate within the electronic medical record - see encounter  notes.    Compliance: yes    Side effects: None    Risk Parameters:  Patient reports no suicidal ideation  Patient reports no homicidal ideation  Patient reports no self-injurious behavior  Patient reports no violent behavior    Exam (detailed: at least 9 elements; comprehensive: all 15 elements)   Constitutional  Vitals:  Most recent vital signs, dated less than 90 days prior to this appointment, were reviewed.   Vitals:    03/04/24 0845   BP: 126/76   Pulse: 85   Temp: 97.2 °F (36.2 °C)   SpO2: 100%            General:  unremarkable, age appropriate     Musculoskeletal  Muscle Strength/Tone:  no spasicity, no rigidity, no cogwheeling, no flaccidity, no paratonia, no dyskinesia, no dystonia, no tremor, no tic, no choreoathetosis, no atrophy   Gait & Station:  non-ataxic     Psychiatric  Speech:  no latency; no press   Mood & Affect:  steady, euthymic  congruent and appropriate   Thought Process:  normal and logical   Associations:  intact   Thought Content:  normal, no suicidality, no homicidality, delusions, or paranoia   Insight:  intact, has awareness of illness   Judgement: behavior is adequate to circumstances, age appropriate   Orientation:  grossly intact   Memory: intact for content of interview   Language: grossly intact   Attention Span & Concentration:  able to focus   Fund of Knowledge:  intact and appropriate to age and level of education     Assessment and Diagnosis   Status/Progress: Based on the examination today, the patient's problem(s) is/are adequately but not ideally controlled.  New problems have been presented today.   Co-morbidities, Diagnostic uncertainty, and Lack of compliance are complicating management of the primary condition.  There are no active rule-out diagnoses for this patient at this time.     General Impression:   Presents 5/9/23- Patient reports symptoms are well controlled but not ideal. She continues to have mild cycling of hypomania and depression. Lamictal currently at 200  mg. Will increase Abilify to 7 mg Daily, and reassess in 2-4 weeks. Labs reviewed from 03/23 WNL. AIMS=0   Present 6/12/23- Continue with previous plan from 5/9/23, will follow up in 2-4 weeks  .   Presents 8/14/23- Patient reports symptoms are well controlled but not ideal. She continues to have minimal cycling with some depression. Lamictal currently at 200 mg. Will increase Abilify to 10 mg Daily, and reassess in 8 weeks. Labs ordered to be drawn before next visit. AIMS=0 .   Presents 12/19/23- continues mild mood cycling. Plan to increase Lamictal after drug levels come back. Lamictal was increased to 250 mg Daily  Presents 1/24/24- Mood cycle seems stable. Anxiety is increased, Start Zoloft 50 mg Daily.  Presents 3/4/24- mood has become unstable with Zoloft, Increase Buspar to TID, D/Zoloft.        ICD-10-CM ICD-9-CM    1. Bipolar 2 disorder  F31.81 296.89 ARIPiprazole (ABILIFY) 10 MG Tab      lamoTRIgine 250 mg TR24      2. DUNCAN (generalized anxiety disorder)  F41.1 300.02 LORazepam (ATIVAN) 0.5 MG tablet      busPIRone (BUSPAR) 15 MG tablet            Intervention/Counseling/Treatment Plan   Medication Management: The risks and benefits of medication were discussed with the patient.  Labs, Diagnostic Studies: order Lipid Panel, CMP, Hem A1C  for next visit  Continue Lamictal 250 mg ER Daily  Continue Abilify to 10 mg Daily  Continue Ativan 0.5 mg Daily PRN  Checked LA  and no irregularities were noted.  Last refill picked up on 12/19/24  Provided with 1 refills, starting on 3/4/24   Increase Buspar 7.5 mg in AM and noon, at noon and 15 mg in PM  D/C Zoloft  Discussed diagnosis, risk and benefits of proposed treatment above vs alternative treatment vs no treatment, and potential side effects of these treatments, and the inherent unpredictability of individual responses to these treatments. The patient expresses understanding and gives informed consent to pursue treatment at this time, believing that the  potential benefits outweigh the potential risks. Patient has no other questions. Risks/adverse effects at this time include but are not limited to: GI side effects, sexual dysfunction, activation vs sedation, triggering of suicidal ideation, and serotonin syndrome.   Patient voices understanding and agreement with this plan  Provided crisis numbers  Encouraged patient to keep future appointments  Instruct patient to call or message with questions  In the event of an emergency, including suicidal ideation, patient was advised to go to the emergency room      Return to Clinic: 1 month      Laura Ritter DNP, PMKARELP, FNP

## 2024-03-22 ENCOUNTER — OFFICE VISIT (OUTPATIENT)
Dept: PRIMARY CARE CLINIC | Facility: CLINIC | Age: 34
End: 2024-03-22
Payer: COMMERCIAL

## 2024-03-22 VITALS
HEART RATE: 97 BPM | DIASTOLIC BLOOD PRESSURE: 60 MMHG | RESPIRATION RATE: 16 BRPM | HEIGHT: 61 IN | BODY MASS INDEX: 23.39 KG/M2 | TEMPERATURE: 99 F | WEIGHT: 123.88 LBS | SYSTOLIC BLOOD PRESSURE: 110 MMHG | OXYGEN SATURATION: 98 %

## 2024-03-22 DIAGNOSIS — M25.50 ARTHRALGIA, UNSPECIFIED JOINT: ICD-10-CM

## 2024-03-22 DIAGNOSIS — M67.441 GANGLION CYST OF FINGER OF RIGHT HAND: ICD-10-CM

## 2024-03-22 DIAGNOSIS — Z00.00 ANNUAL PHYSICAL EXAM: Primary | ICD-10-CM

## 2024-03-22 PROCEDURE — 1160F RVW MEDS BY RX/DR IN RCRD: CPT | Mod: CPTII,S$GLB,, | Performed by: STUDENT IN AN ORGANIZED HEALTH CARE EDUCATION/TRAINING PROGRAM

## 2024-03-22 PROCEDURE — 99395 PREV VISIT EST AGE 18-39: CPT | Mod: S$GLB,,, | Performed by: STUDENT IN AN ORGANIZED HEALTH CARE EDUCATION/TRAINING PROGRAM

## 2024-03-22 PROCEDURE — 3078F DIAST BP <80 MM HG: CPT | Mod: CPTII,S$GLB,, | Performed by: STUDENT IN AN ORGANIZED HEALTH CARE EDUCATION/TRAINING PROGRAM

## 2024-03-22 PROCEDURE — 99999 PR PBB SHADOW E&M-EST. PATIENT-LVL IV: CPT | Mod: PBBFAC,,, | Performed by: STUDENT IN AN ORGANIZED HEALTH CARE EDUCATION/TRAINING PROGRAM

## 2024-03-22 PROCEDURE — 3074F SYST BP LT 130 MM HG: CPT | Mod: CPTII,S$GLB,, | Performed by: STUDENT IN AN ORGANIZED HEALTH CARE EDUCATION/TRAINING PROGRAM

## 2024-03-22 PROCEDURE — 1159F MED LIST DOCD IN RCRD: CPT | Mod: CPTII,S$GLB,, | Performed by: STUDENT IN AN ORGANIZED HEALTH CARE EDUCATION/TRAINING PROGRAM

## 2024-03-22 PROCEDURE — 3008F BODY MASS INDEX DOCD: CPT | Mod: CPTII,S$GLB,, | Performed by: STUDENT IN AN ORGANIZED HEALTH CARE EDUCATION/TRAINING PROGRAM

## 2024-03-22 NOTE — PROGRESS NOTES
Subjective:           Patient ID: Ratna Hanson   Age:  33 y.o.  Sex: female     Chief Complaint:   Rash (Left outer thigh), Joint Pain, Tingling (Skin all over body), Cough, Nasal Congestion, Generalized Body Aches, and Headache      History of Present Illness:    Ratna Hanson is a 33 y.o. female who presents today with a chief complaint of Rash (Left outer thigh), Joint Pain, Tingling (Skin all over body), Cough, Nasal Congestion, Generalized Body Aches, and Headache  .    Patient presenting for rash left outer portion of thigh, joint pain, tingling to skin as well as cough congestion and generalized body aches/headache.    Answers submitted by the patient for this visit:  Rash Questionnaire (Submitted on 3/21/2024)  Chief Complaint: Rash  Chronicity: new  Onset: in the past 7 days  Progression since onset: gradually worsening  Affected locations: left upper leg  Characteristics: burning, pain, redness, swelling, itchiness  Exposed to: unknown  anorexia: No  facial edema: No  joint pain: Yes  nail changes: No  Treatments tried: analgesics, anti-itch cream, antihistamine  Improvement on treatment: mild  asthma: No  allergies: No  eczema: No  varicella: Yes    Has had a cough since last Friday.   [place] more like allergies then.  Daughter started getting sick Monday with congestion and mucous.  Then yesterday, started w/ worse s/s.      Main concern is a rash on her leg.    Started on Sunday. Was initially red/raised.  Started w/ bumps first, then after developed some itchyness.  Is painful and itchy.   Has been getting some skin sensitivity.    Clothes were irritating. This started on Monday.  Used Ibuprofen which did help with skin itching/tingling.  Lesions are fine today, did not take any meds today.     Few weeks ago got bodywide joint pains.  Was changing Buspar to Zoloft.      Tried to use Zoloft 50 mg, but was not tolerated.  Thus changed back to Buspar.   So moved back to Buspar. Was told to take 1/2 tab in  "AM, 1/2 tab mid-day and a full dose at night.  Has been instead taking 5mg at noon due to getting dizzy when taking 10mg.     Also taking:  Abilify to 10 mg Daily  Lamictal 250 mg Daily  Ativan 0.5 mg Daily PRN      Review of Systems   Constitutional:  Negative for fatigue and fever.   HENT:  Negative for congestion, rhinorrhea and sore throat.    Eyes:  Negative for pain.   Respiratory:  Positive for cough. Negative for shortness of breath.    Gastrointestinal:  Negative for diarrhea and vomiting.   Skin:  Positive for rash.           Objective:        Vitals:    03/22/24 0924   BP: 110/60   BP Location: Right arm   Patient Position: Sitting   BP Method: Medium (Manual)   Pulse: 97   Resp: 16   Temp: 98.6 °F (37 °C)   TempSrc: Oral   SpO2: 98%   Weight: 56.2 kg (123 lb 14.4 oz)   Height: 5' 1" (1.549 m)       Body mass index is 23.41 kg/m².      Physical Exam  Constitutional:       General: She is not in acute distress.     Appearance: Normal appearance. She is not ill-appearing.   HENT:      Head: Normocephalic and atraumatic.      Right Ear: External ear normal.      Left Ear: External ear normal.      Nose: No rhinorrhea.   Eyes:      Extraocular Movements: Extraocular movements intact.      Conjunctiva/sclera: Conjunctivae normal.   Cardiovascular:      Rate and Rhythm: Normal rate.      Pulses: Normal pulses.   Pulmonary:      Effort: Pulmonary effort is normal.   Musculoskeletal:      Right lower leg: No edema.      Left lower leg: No edema.   Skin:     Capillary Refill: Capillary refill takes less than 2 seconds.      Coloration: Skin is not jaundiced.      Findings: No bruising.   Neurological:      General: No focal deficit present.      Mental Status: She is alert and oriented to person, place, and time.      Motor: No weakness.      Gait: Gait normal.   Psychiatric:         Mood and Affect: Mood normal.           Past Medical History:   Diagnosis Date    Anxiety     hx lexapro and seroquel use    Bipolar " "disorder     Breast disorder     Benign breast cysts; negative imaging    Cholestasis during pregnancy in third trimester 8/22/2018    Postpartum depression     Used seroquil x ~ 1 month    Pre-eclampsia affecting pregnancy, antepartum 9/1/2018       Lab Results   Component Value Date     12/20/2023    K 4.8 12/20/2023     12/20/2023    CO2 23 12/20/2023    BUN 16 12/20/2023    CREATININE 0.8 12/20/2023    ANIONGAP 10 12/20/2023     Lab Results   Component Value Date    HGBA1C 5.1 12/20/2023     No results found for: "BNP", "BNPTRIAGEBLO"    Lab Results   Component Value Date    WBC 6.55 03/07/2023    HGB 13.7 03/07/2023    HCT 41.8 03/07/2023     03/07/2023    GRAN 4.1 03/07/2023    GRAN 62.3 03/07/2023     Lab Results   Component Value Date    CHOL 143 12/20/2023    HDL 58 12/20/2023    LDLCALC 75.6 12/20/2023    TRIG 47 12/20/2023        Outpatient Encounter Medications as of 3/22/2024   Medication Sig Dispense Refill    ARIPiprazole (ABILIFY) 10 MG Tab Take 1 tablet (10 mg total) by mouth once daily. 90 tablet 1    busPIRone (BUSPAR) 15 MG tablet TAKE 1/2 TABLET BY MOUTH EVERY MORNING AND 1 TABLET BY MOUTH EVERY AFTERNOON 90 tablet 0    lamoTRIgine 250 mg TR24 Take 250 mg by mouth once daily. 90 tablet 1    LORazepam (ATIVAN) 0.5 MG tablet TAKE 1 TABLET(0.5 MG) BY MOUTH DAILY AS NEEDED FOR ANXIETY 10 tablet 0    [DISCONTINUED] omeprazole (PRILOSEC) 40 MG capsule Take 1 capsule (40 mg total) by mouth once daily. 30 capsule 1     Facility-Administered Encounter Medications as of 3/22/2024   Medication Dose Route Frequency Provider Last Rate Last Admin    0.9%  NaCl infusion   Intravenous Continuous Jeff Ramos MD        LIDOcaine (PF) 10 mg/ml (1%) injection 10 mg  1 mL Intradermal Once PRN Jeff Ramos MD              Assessment:       1. Annual physical exam    2. Ganglion cyst of finger of right hand    3. Arthralgia, unspecified joint           Plan:         Annual " physical exam   -     Left hip rash/lesions:   - lesions noted by patient to left hip/upper leg.  Appeared approximately 1 week ago had red inflamed coloration initially that may have suggested infection, however have since scabbed over and become purplish in color.  Are not painful at this time.    - Lesions never blistered, not likely to be shingles.  - history is suggestive of contact irritation that was exacerbated by recent viral syndrome/illness.  Is improving this time.  Can use topical lidocaine and oral NSAID to help continued improvement at this time and notify clinic if worsens.  If becomes red and inflamed again could consider antibiotic, but does not appear necessary at this time.    Viral syndrome:   - patient has had recent inflammation of the body with improvement over the last day or 2.  - patient's daughter had been sick this week as well, had likely dealt with similar infection.  - did not advise antibiotics at this time due to improving nature of her illness.  Additionally would avoid steroids if possible.      Ganglion cyst of finger of right hand   - patient has a very small ganglion cyst noted to base of 3rd digit of right hand.   - recommend watchful waiting.    Arthralgia, unspecified joint  -  CBC, CRP, TSH, ESR, RF, LEYDI, and CK.  -  had episode of body wide skin tingling in pain, has had issues with joint pains and aches as well.  - had concern of possible fibromyalgia after discussing with family.  - discussed the etiology of fibromyalgia with patient today.  Explained that best therapy for this condition would include physical therapy, stretching, strengthening and massage.  Will have to rule out other possible causes, thus will be checking inflammatory markers including ESR, CRP, RF, CK and LEYDI.   - recommended patient only have these markers checked once she is feeling better, ideally at least 2 weeks from now when she has not having a current infection or inflammatory process in the  body including any skin rashes, respiratory infections or UTIs.      - additionally discussed with patient that a SNRI or tricyclic antidepressant may be able to provide dual therapy for helping with depression/anxiety while also helping with potential inflammatory or nerve pain conditions such as fibromyalgia.  Will send message to patient's psychiatric provider to consider this is alternative therapy to the Zoloft which was recently discontinued.

## 2024-03-22 NOTE — PATIENT INSTRUCTIONS
Annual physical exam   -     Left hip rash/lesions:   - lesions noted by patient to left hip/upper leg.  Appeared approximately 1 week ago had red inflamed coloration initially that may have suggested infection, however have since scabbed over and become purplish in color.  Are not painful at this time.    - Lesions never blistered, not likely to be shingles.  - history is suggestive of contact irritation that was exacerbated by recent viral syndrome/illness.  Is improving this time.  Can use topical lidocaine and oral NSAID to help continued improvement at this time and notify clinic if worsens.  If becomes red and inflamed again could consider antibiotic, but does not appear necessary at this time.    Viral syndrome:   - patient has had recent inflammation of the body with improvement over the last day or 2.  - patient's daughter had been sick this week as well, had likely dealt with similar infection.  - did not advise antibiotics at this time due to improving nature of her illness.  Additionally would avoid steroids if possible.      Ganglion cyst of finger of right hand   - patient has a very small ganglion cyst noted to base of 3rd digit of right hand.   - recommend watchful waiting.    Arthralgia, unspecified joint  -  CBC, CRP, TSH, ESR, RF, LEYDI, and CK.  -  had episode of body wide skin tingling in pain, has had issues with joint pains and aches as well.  - had concern of possible fibromyalgia after discussing with family.  - discussed the etiology of fibromyalgia with patient today.  Explained that best therapy for this condition would include physical therapy, stretching, strengthening and massage.  Will have to rule out other possible causes, thus will be checking inflammatory markers including ESR, CRP, RF, CK and LEYDI.   - recommended patient only have these markers checked once she is feeling better, ideally at least 2 weeks from now when she has not having a current infection or inflammatory process  in the body including any skin rashes, respiratory infections or UTIs.  - additionally discussed with patient that a SNRI or tricyclic antidepressant may be able to provide dual therapy for helping with depression/anxiety while also helping with potential inflammatory or nerve pain conditions such as fibromyalgia.  Will send message to patient's psychiatric provider to consider this is alternative therapy to the Zoloft which was recently discontinued.

## 2024-03-22 NOTE — Clinical Note
Spoke w/ patient today, she has concern of possible Fibromyalgia which I can work on, but will be getting labs then considering PT as first line therapy.  As she is also getting treated for depression/anxiety, wanted to ask if you are considering re-trying a SSRI/SNRI/TCA, could I request you try her on the SNRI or TCA to get the nerve pain benefit of a Duloxetine, nortriptyline, or effexor?  Thanks, SB

## 2024-04-08 ENCOUNTER — OFFICE VISIT (OUTPATIENT)
Dept: PSYCHOLOGY | Facility: CLINIC | Age: 34
End: 2024-04-08
Payer: COMMERCIAL

## 2024-04-08 VITALS
SYSTOLIC BLOOD PRESSURE: 111 MMHG | DIASTOLIC BLOOD PRESSURE: 77 MMHG | TEMPERATURE: 97 F | OXYGEN SATURATION: 100 % | HEART RATE: 74 BPM

## 2024-04-08 DIAGNOSIS — F31.81 BIPOLAR 2 DISORDER: ICD-10-CM

## 2024-04-08 DIAGNOSIS — F41.1 GAD (GENERALIZED ANXIETY DISORDER): ICD-10-CM

## 2024-04-08 PROCEDURE — 3078F DIAST BP <80 MM HG: CPT | Mod: CPTII,S$GLB,, | Performed by: NURSE PRACTITIONER

## 2024-04-08 PROCEDURE — 99214 OFFICE O/P EST MOD 30 MIN: CPT | Mod: S$GLB,,, | Performed by: NURSE PRACTITIONER

## 2024-04-08 PROCEDURE — 99999 PR PBB SHADOW E&M-EST. PATIENT-LVL II: CPT | Mod: PBBFAC,,, | Performed by: NURSE PRACTITIONER

## 2024-04-08 PROCEDURE — 1159F MED LIST DOCD IN RCRD: CPT | Mod: CPTII,S$GLB,, | Performed by: NURSE PRACTITIONER

## 2024-04-08 PROCEDURE — 3074F SYST BP LT 130 MM HG: CPT | Mod: CPTII,S$GLB,, | Performed by: NURSE PRACTITIONER

## 2024-04-08 RX ORDER — BUSPIRONE HYDROCHLORIDE 15 MG/1
TABLET ORAL
Qty: 90 TABLET | Refills: 1 | Status: SHIPPED | OUTPATIENT
Start: 2024-04-08

## 2024-04-08 RX ORDER — ARIPIPRAZOLE 10 MG/1
10 TABLET ORAL DAILY
Qty: 90 TABLET | Refills: 1 | Status: SHIPPED | OUTPATIENT
Start: 2024-04-08 | End: 2024-10-05

## 2024-04-08 RX ORDER — LAMOTRIGINE 250 MG/1
250 TABLET, EXTENDED RELEASE ORAL DAILY
Qty: 90 TABLET | Refills: 1 | Status: SHIPPED | OUTPATIENT
Start: 2024-04-08 | End: 2024-06-03 | Stop reason: SDUPTHER

## 2024-04-08 NOTE — PROGRESS NOTES
Outpatient Psychiatry Follow[facility]Up Visit [facility]    04/08/2024    Clinical Status of Patient:  Outpatient (Ambulatory)    Chief Complaint:  Ratna Hanson is a 33 y.o. female who presents today for follow-up of mood disorder and anxiety.  Met with patient.     Current Medications:   Abilify to 10 mg Daily  Lamictal 250 mg Daily  Ativan 0.5 mg Daily PRN  Buspar 7.5 mg in AM, 15 mg in PM    Past medication trials:   Lexapro- suicidal thoughts age 16, during Post partum depression.  Zoloft- Anxiety, Depression, sexual side effects, (possible Body aches)     Interval History and Content of Current Session:  Patient seen and chart reviewed. Last seen on 3/4/24    Changes at last visit:  Continue Lamictal 250 mg ER Daily  Continue Abilify to 10 mg Daily  Continue Ativan 0.5 mg Daily PRN  Increase Buspar 7.5 mg in AM and noon, at noon and 15 mg in PM  D/C Zoloft    She reports she's been feeling well, emotionally stable and happy with her medication regiment at this time. Denies mood cycling and reports anxiety is 2/10 (10 being the worst). Still has mild concerns about inability to lose weight even with walking 15 miles/ week and eating healthy meals.    Denies SI/HI/AVH. Denies adverse effects from medication  Pt reports taking medications as prescribed and behaving appropriately during interview today.    Pt appears:  Appropriate attire and affect    Mood:  Calm, happy    Sleep:  Improved sleeps 9-12 hrs night. Does not wake in the middle of the night any   longer with extended release     Appetite:  Normal, watching and mindful of diet    Self Rates Depression: 1/10  Self Rated Anxiety:  2/10    AIMS:    0    Review of Systems   PSYCHIATRIC: Pertinant items are noted in the narrative.    Past Medication Trials:    Past Medical, Family and Social History: The patient's past medical, family and social history have been reviewed and updated as appropriate within the electronic medical record - see encounter  notes.    Compliance: yes    Side effects: None    Risk Parameters:  Patient reports no suicidal ideation  Patient reports no homicidal ideation  Patient reports no self-injurious behavior  Patient reports no violent behavior    Exam (detailed: at least 9 elements; comprehensive: all 15 elements)   Constitutional  Vitals:  Most recent vital signs, dated less than 90 days prior to this appointment, were reviewed.   Vitals:    04/08/24 0923   BP: 111/77   Pulse: 74   Temp: 97.2 °F (36.2 °C)   SpO2: 100%              General:  unremarkable, age appropriate     Musculoskeletal  Muscle Strength/Tone:  no spasicity, no rigidity, no cogwheeling, no flaccidity, no paratonia, no dyskinesia, no dystonia, no tremor, no tic, no choreoathetosis, no atrophy   Gait & Station:  non-ataxic     Psychiatric  Speech:  no latency; no press   Mood & Affect:  steady, euthymic  congruent and appropriate   Thought Process:  normal and logical   Associations:  intact   Thought Content:  normal, no suicidality, no homicidality, delusions, or paranoia   Insight:  intact, has awareness of illness   Judgement: behavior is adequate to circumstances, age appropriate   Orientation:  grossly intact   Memory: intact for content of interview   Language: grossly intact   Attention Span & Concentration:  able to focus   Fund of Knowledge:  intact and appropriate to age and level of education     Assessment and Diagnosis   Status/Progress: Based on the examination today, the patient's problem(s) is/are adequately but not ideally controlled.  New problems have been presented today.   Co-morbidities, Diagnostic uncertainty, and Lack of compliance are complicating management of the primary condition.  There are no active rule-out diagnoses for this patient at this time.     General Impression:   Presents 5/9/23- Patient reports symptoms are well controlled but not ideal. She continues to have mild cycling of hypomania and depression. Lamictal currently at  200 mg. Will increase Abilify to 7 mg Daily, and reassess in 2-4 weeks. Labs reviewed from 03/23 WNL. AIMS=0   Present 6/12/23- Continue with previous plan from 5/9/23, will follow up in 2-4 weeks  .   Presents 8/14/23- Patient reports symptoms are well controlled but not ideal. She continues to have minimal cycling with some depression. Lamictal currently at 200 mg. Will increase Abilify to 10 mg Daily, and reassess in 8 weeks. Labs ordered to be drawn before next visit. AIMS=0 .   Presents 12/19/23- continues mild mood cycling. Plan to increase Lamictal after drug levels come back. Lamictal was increased to 250 mg Daily  Presents 1/24/24- Mood cycle seems stable. Anxiety is increased, Start Zoloft 50 mg Daily.  Presents 3/4/24- Mood has become unstable with Zoloft, Increase Buspar to TID, D/Zoloft.  Presents 4/8/24- Stable, Continue medications, monitor weight weekly.      ICD-10-CM ICD-9-CM    1. DUNCAN (generalized anxiety disorder)  F41.1 300.02 busPIRone (BUSPAR) 15 MG tablet      2. Bipolar 2 disorder  F31.81 296.89 LIPID PANEL      HEMOGLOBIN A1C      COMPREHENSIVE METABOLIC PANEL      lamoTRIgine 250 mg TR24      ARIPiprazole (ABILIFY) 10 MG Tab          Intervention/Counseling/Treatment Plan   Medication Management: The risks and benefits of medication were discussed with the patient.  Labs, Diagnostic Studies: order Lipid Panel, CMP, Hem A1C  for next visit  Continue Lamictal 250 mg ER Daily  Continue Abilify to 10 mg Daily  Continue Ativan 0.5 mg Daily PRN - not re prescribed  Continue  Buspar 7.5 mg in AM and noon, at noon and 15 mg in PM  Discussed diagnosis, risk and benefits of proposed treatment above vs alternative treatment vs no treatment, and potential side effects of these treatments, and the inherent unpredictability of individual responses to these treatments. The patient expresses understanding and gives informed consent to pursue treatment at this time, believing that the potential benefits  outweigh the potential risks. Patient has no other questions. Risks/adverse effects at this time include but are not limited to: GI side effects, sexual dysfunction, activation vs sedation, triggering of suicidal ideation, and serotonin syndrome.   Patient voices understanding and agreement with this plan  Provided crisis numbers  Encouraged patient to keep future appointments  Instruct patient to call or message with questions  In the event of an emergency, including suicidal ideation, patient was advised to go to the emergency room      Return to Clinic: 3 months      Laura Ritter DNP, [id number], FNP

## 2024-06-03 DIAGNOSIS — F31.81 BIPOLAR 2 DISORDER: ICD-10-CM

## 2024-06-03 RX ORDER — LAMOTRIGINE 250 MG/1
250 TABLET, EXTENDED RELEASE ORAL DAILY
Qty: 90 TABLET | Refills: 1 | Status: SHIPPED | OUTPATIENT
Start: 2024-06-03 | End: 2024-09-01

## 2024-07-08 ENCOUNTER — OFFICE VISIT (OUTPATIENT)
Dept: PSYCHOLOGY | Facility: CLINIC | Age: 34
End: 2024-07-08
Payer: COMMERCIAL

## 2024-07-08 VITALS — SYSTOLIC BLOOD PRESSURE: 113 MMHG | HEART RATE: 70 BPM | DIASTOLIC BLOOD PRESSURE: 80 MMHG

## 2024-07-08 DIAGNOSIS — F31.81 BIPOLAR 2 DISORDER: Primary | ICD-10-CM

## 2024-07-08 DIAGNOSIS — F41.1 GAD (GENERALIZED ANXIETY DISORDER): ICD-10-CM

## 2024-07-08 PROCEDURE — 3079F DIAST BP 80-89 MM HG: CPT | Mod: CPTII,S$GLB,, | Performed by: NURSE PRACTITIONER

## 2024-07-08 PROCEDURE — 99999 PR PBB SHADOW E&M-EST. PATIENT-LVL II: CPT | Mod: PBBFAC,,, | Performed by: NURSE PRACTITIONER

## 2024-07-08 PROCEDURE — 3074F SYST BP LT 130 MM HG: CPT | Mod: CPTII,S$GLB,, | Performed by: NURSE PRACTITIONER

## 2024-07-08 PROCEDURE — 99214 OFFICE O/P EST MOD 30 MIN: CPT | Mod: S$GLB,,, | Performed by: NURSE PRACTITIONER

## 2024-07-08 RX ORDER — LORAZEPAM 0.5 MG/1
TABLET ORAL
Qty: 10 TABLET | Refills: 0 | Status: SHIPPED | OUTPATIENT
Start: 2024-07-08

## 2024-07-08 NOTE — PROGRESS NOTES
Outpatient Psychiatry Follow-Up Visit ([facility])    07/08/2024    Clinical Status of Patient:  Outpatient (Ambulatory)    Chief Complaint:  Ratna Hanson is a 33 y.o. female who presents today for follow-up of mood disorder and anxiety.  Met with patient.     Current Medications:   Abilify to 10 mg Daily  Lamictal 250 mg Daily  Ativan 0.5 mg Daily PRN  Buspar 7.5 mg in AM, 15 mg in PM    Past medication trials:   Lexapro- suicidal thoughts age 16, during Post partum depression.  Zoloft- Anxiety, Depression, sexual side effects, (possible Body aches)     Interval History and Content of Current Session:  Patient seen and chart reviewed. Last seen on 4/8/24    Changes at last visit:  Continue Lamictal 250 mg ER Daily  Continue Abilify to 10 mg Daily  Continue Ativan 0.5 mg Daily PRN  Continue  Buspar 7.5 mg in AM and noon, at noon and 15 mg in PM    Denies concern for weight changes. Continue to walk 11 miles/week.  Reports that her emotions have been stable, anxiety has been good, reports her son going off to college this August 2024. Has not had to take the lorazepam at all since last visit. She reports she's been feeling well, emotionally stable and happy with her medication regiment at this time. Denies mood cycling     Denies SI/HI/AVH. Denies adverse effects from medication  Pt reports taking medications as prescribed and behaving appropriately during interview today.    Pt appears:  Appropriate attire and affect    Mood:  Calm, happy    Sleep:  Improved 9-12 hrs night. Does not wake in the middle of the night any   longer with extended release     Appetite:  Normal, watching and mindful of diet    Self Rates Depression: 1/10  Self Rated Anxiety:  2/10    AIMS:    0    Review of Systems   PSYCHIATRIC: Pertinant items are noted in the narrative.    Past Medication Trials:    Past Medical, Family and Social History: The patient's past medical, family and social history have been reviewed and updated as appropriate  within the electronic medical record - see encounter notes.    Compliance: yes    Side effects: None    Risk Parameters:  Patient reports no suicidal ideation  Patient reports no homicidal ideation  Patient reports no self-injurious behavior  Patient reports no violent behavior    Exam (detailed: at least 9 elements; comprehensive: all 15 elements)   Constitutional  Vitals:  Most recent vital signs, dated less than 90 days prior to this appointment, were reviewed.   Vitals:    07/08/24 0852   BP: 113/80   Pulse: 70                General:  unremarkable, age appropriate     Musculoskeletal  Muscle Strength/Tone:  no spasicity, no rigidity, no cogwheeling, no flaccidity, no paratonia, no dyskinesia, no dystonia, no tremor, no tic, no choreoathetosis, no atrophy   Gait & Station:  non-ataxic     Psychiatric  Speech:  no latency; no press   Mood & Affect:  steady, euthymic  congruent and appropriate   Thought Process:  normal and logical   Associations:  intact   Thought Content:  normal, no suicidality, no homicidality, delusions, or paranoia   Insight:  intact, has awareness of illness   Judgement: behavior is adequate to circumstances, age appropriate   Orientation:  grossly intact   Memory: intact for content of interview   Language: grossly intact   Attention Span & Concentration:  able to focus   Fund of Knowledge:  intact and appropriate to age and level of education     Assessment and Diagnosis   Status/Progress: Based on the examination today, the patient's problem(s) is/are adequately but not ideally controlled.  New problems have been presented today.   Co-morbidities, Diagnostic uncertainty, and Lack of compliance are complicating management of the primary condition.  There are no active rule-out diagnoses for this patient at this time.     General Impression:   Presents 5/9/23- Patient reports symptoms are well controlled but not ideal. She continues to have mild cycling of hypomania and depression.  Lamictal currently at 200 mg. Will increase Abilify to 7 mg Daily, and reassess in 2-4 weeks. Labs reviewed from 03/23 WNL. AIMS=0   Present 6/12/23- Continue with previous plan from 5/9/23, will follow up in 2-4 weeks  .   Presents 8/14/23- Patient reports symptoms are well controlled but not ideal. She continues to have minimal cycling with some depression. Lamictal currently at 200 mg. Will increase Abilify to 10 mg Daily, and reassess in 8 weeks. Labs ordered to be drawn before next visit. AIMS=0 .   Presents 12/19/23- continues mild mood cycling. Plan to increase Lamictal after drug levels come back. Lamictal was increased to 250 mg Daily  Presents 1/24/24- Mood cycle seems stable. Anxiety is increased, Start Zoloft 50 mg Daily.  Presents 3/4/24- Mood has become unstable with Zoloft, Increase Buspar to TID, D/Zoloft.  Presents 4/8/24- Stable, Continue medications, monitor weight weekly.  Presents 7/8/24- Stable continue meds, ETC in 6 months      ICD-10-CM ICD-9-CM    1. Bipolar 2 disorder  F31.81 296.89 LIPID PANEL      HEMOGLOBIN A1C      COMPREHENSIVE METABOLIC PANEL      CBC W/ AUTO DIFFERENTIAL      TSH      2. DUNCAN (generalized anxiety disorder)  F41.1 300.02 LORazepam (ATIVAN) 0.5 MG tablet            Intervention/Counseling/Treatment Plan   Medication Management: The risks and benefits of medication were discussed with the patient.  Labs, Diagnostic Studies: order Lipid Panel, CMP, Hem A1C  for next visit  Continue Lamictal 250 mg ER Daily  Continue Abilify to 10 mg Daily  Continue Ativan 0.5 mg Daily PRN  Checked LA  and no irregularities were noted.  Last refill picked up on 3/4/24  Provided with 1, 10 tab refill, starting on 7/8/24   Continue  Buspar 7.5 mg in AM and noon, at noon and 15 mg in PM  Discussed diagnosis, risk and benefits of proposed treatment above vs alternative treatment vs no treatment, and potential side effects of these treatments, and the inherent unpredictability of individual  responses to these treatments. The patient expresses understanding and gives informed consent to pursue treatment at this time, believing that the potential benefits outweigh the potential risks. Patient has no other questions. Risks/adverse effects at this time include but are not limited to: GI side effects, sexual dysfunction, activation vs sedation, triggering of suicidal ideation, and serotonin syndrome.   Patient voices understanding and agreement with this plan  Provided crisis numbers  Encouraged patient to keep future appointments  Instruct patient to call or message with questions  In the event of an emergency, including suicidal ideation, patient was advised to go to the emergency room      Return to Clinic: 6 months      Laura Ritter DNP, PMHNP, FNP

## 2024-09-19 ENCOUNTER — PATIENT MESSAGE (OUTPATIENT)
Dept: PRIMARY CARE CLINIC | Facility: CLINIC | Age: 34
End: 2024-09-19
Payer: COMMERCIAL

## 2024-10-15 DIAGNOSIS — F41.1 GAD (GENERALIZED ANXIETY DISORDER): ICD-10-CM

## 2024-10-16 RX ORDER — BUSPIRONE HYDROCHLORIDE 15 MG/1
TABLET ORAL
Qty: 90 TABLET | Refills: 1 | Status: SHIPPED | OUTPATIENT
Start: 2024-10-16

## 2025-03-27 DIAGNOSIS — F31.81 BIPOLAR 2 DISORDER: ICD-10-CM

## 2025-03-28 ENCOUNTER — PATIENT MESSAGE (OUTPATIENT)
Dept: PSYCHOLOGY | Facility: CLINIC | Age: 35
End: 2025-03-28
Payer: COMMERCIAL

## 2025-03-28 RX ORDER — ARIPIPRAZOLE 10 MG/1
10 TABLET ORAL DAILY
Qty: 30 TABLET | Refills: 0 | Status: SHIPPED | OUTPATIENT
Start: 2025-03-28 | End: 2025-04-27

## 2025-04-03 ENCOUNTER — OFFICE VISIT (OUTPATIENT)
Dept: INTERNAL MEDICINE | Facility: CLINIC | Age: 35
End: 2025-04-03
Payer: COMMERCIAL

## 2025-04-03 ENCOUNTER — OFFICE VISIT (OUTPATIENT)
Dept: OBSTETRICS AND GYNECOLOGY | Facility: CLINIC | Age: 35
End: 2025-04-03
Payer: COMMERCIAL

## 2025-04-03 ENCOUNTER — PATIENT MESSAGE (OUTPATIENT)
Dept: INTERNAL MEDICINE | Facility: CLINIC | Age: 35
End: 2025-04-03

## 2025-04-03 VITALS
SYSTOLIC BLOOD PRESSURE: 118 MMHG | DIASTOLIC BLOOD PRESSURE: 84 MMHG | WEIGHT: 120.38 LBS | BODY MASS INDEX: 22.73 KG/M2 | HEIGHT: 61 IN

## 2025-04-03 VITALS
SYSTOLIC BLOOD PRESSURE: 129 MMHG | HEIGHT: 61 IN | DIASTOLIC BLOOD PRESSURE: 86 MMHG | WEIGHT: 120.56 LBS | HEART RATE: 99 BPM | OXYGEN SATURATION: 95 % | BODY MASS INDEX: 22.76 KG/M2

## 2025-04-03 DIAGNOSIS — Z12.4 ENCOUNTER FOR SCREENING FOR CERVICAL CANCER: ICD-10-CM

## 2025-04-03 DIAGNOSIS — T14.8XXA BRUISE: ICD-10-CM

## 2025-04-03 DIAGNOSIS — Z76.89 ENCOUNTER TO ESTABLISH CARE: Primary | ICD-10-CM

## 2025-04-03 DIAGNOSIS — R11.0 CHRONIC NAUSEA: ICD-10-CM

## 2025-04-03 DIAGNOSIS — Z01.419 WELL WOMAN EXAM: Primary | ICD-10-CM

## 2025-04-03 DIAGNOSIS — Z00.00 ANNUAL PHYSICAL EXAM: ICD-10-CM

## 2025-04-03 DIAGNOSIS — F41.9 ANXIETY: ICD-10-CM

## 2025-04-03 DIAGNOSIS — F31.9 BIPOLAR DEPRESSION: ICD-10-CM

## 2025-04-03 PROCEDURE — 1160F RVW MEDS BY RX/DR IN RCRD: CPT | Mod: CPTII,S$GLB,,

## 2025-04-03 PROCEDURE — 3079F DIAST BP 80-89 MM HG: CPT | Mod: CPTII,S$GLB,,

## 2025-04-03 PROCEDURE — 99204 OFFICE O/P NEW MOD 45 MIN: CPT | Mod: 25,S$GLB,,

## 2025-04-03 PROCEDURE — 99999 PR PBB SHADOW E&M-EST. PATIENT-LVL III: CPT | Mod: PBBFAC,,,

## 2025-04-03 PROCEDURE — 1159F MED LIST DOCD IN RCRD: CPT | Mod: CPTII,S$GLB,,

## 2025-04-03 PROCEDURE — 3044F HG A1C LEVEL LT 7.0%: CPT | Mod: CPTII,S$GLB,,

## 2025-04-03 PROCEDURE — 3008F BODY MASS INDEX DOCD: CPT | Mod: CPTII,S$GLB,,

## 2025-04-03 PROCEDURE — 99385 PREV VISIT NEW AGE 18-39: CPT | Mod: S$GLB,,,

## 2025-04-03 PROCEDURE — 3074F SYST BP LT 130 MM HG: CPT | Mod: CPTII,S$GLB,,

## 2025-04-03 PROCEDURE — 99999 PR PBB SHADOW E&M-EST. PATIENT-LVL IV: CPT | Mod: PBBFAC,,,

## 2025-04-03 NOTE — PROGRESS NOTES
"History & Physical  Gynecology      SUBJECTIVE:     Chief Complaint: Annual Exam         History of Present Illness:  Ratna Hanson is a 34 y.o. female   for annual routine Pap and checkup. Patient's last menstrual period was 2025 (exact date)..  She has no unusual complaints. History of breast lump years ago, normal US x2. Has breast implants.        She describes her periods as regular (every 29 days), lasting 6 days. normal flow.  denies break through bleeding.   denies vaginal itching or irritation.  denies vaginal discharge.    She is sexually active with 1 male partner-. Does not use condoms. Declines STI screening.  She uses vasectomy for contraception.    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   Gastrointestinal:  Negative for abdominal pain, constipation, diarrhea, nausea and vomiting.   Genitourinary:  Negative for dysuria, flank pain, frequency, hematuria, pelvic pain, urgency and vaginal discharge.   Musculoskeletal:  Negative for back pain.   Skin:  Negative for rash.   Neurological:  Negative for headaches.        OBJECTIVE:   Vitals:     Vitals:    25 0910   BP: 118/84   Weight: 54.6 kg (120 lb 5.9 oz)   Height: 5' 1" (1.549 m)        Physical Exam:  Physical Exam  Constitutional:       General: She is not in acute distress.     Appearance: She is well-developed.   HENT:      Head: Normocephalic and atraumatic.   Pulmonary:      Effort: Pulmonary effort is normal.   Chest:   Breasts:     Breasts are symmetrical.      Right: Normal. No bleeding, " inverted nipple, mass, nipple discharge, skin change or tenderness.      Left: Normal. No bleeding, inverted nipple, mass, nipple discharge, skin change or tenderness.   Abdominal:      General: There is no distension.      Palpations: Abdomen is soft. There is no mass.      Tenderness: There is no abdominal tenderness. There is no guarding or rebound.      Hernia: No hernia is present.   Genitourinary:     General: Normal vulva.      Exam position: Lithotomy position.      Pubic Area: No rash.       Labia:         Right: No rash or lesion.         Left: No rash or lesion.       Vagina: No vaginal discharge, tenderness or bleeding.      Cervix: Normal.      Uterus: Normal.       Adnexa: Right adnexa normal and left adnexa normal.      Comments: Normal external female genitalia; vagina rugated, normal; cervix normal, no masses; uterus small, mobile, nontender; no adnexal masses palpated; rectal deferred.    Musculoskeletal:         General: Normal range of motion.      Cervical back: Normal range of motion.   Lymphadenopathy:      Upper Body:      Right upper body: No supraclavicular or axillary adenopathy.      Left upper body: No supraclavicular or axillary adenopathy.   Neurological:      Mental Status: She is alert and oriented to person, place, and time.   Psychiatric:         Behavior: Behavior normal.         Thought Content: Thought content normal.         Judgment: Judgment normal.           ASSESSMENT:       ICD-10-CM ICD-9-CM    1. Well woman exam  Z01.419 V72.31       2. Encounter for screening for cervical cancer  Z12.4 V76.2 Liquid-Based Pap Smear, Screening                 Plan:      Ratna was seen today for annual exam.    Diagnoses and all orders for this visit:    Well woman exam  -pap updated  -STI screening declined  -contraception: vasectomy  -gardasil counseling provided, will consider  -MMG at 40  -CScope at 44  -DXA at 64    Encounter for screening for cervical cancer  -     Liquid-Based Pap  Smear, Screening        Follow up in about 1 year (around 4/3/2026).      DEE Staton  Obstetrics & Gynecology

## 2025-04-14 ENCOUNTER — PATIENT MESSAGE (OUTPATIENT)
Dept: OBSTETRICS AND GYNECOLOGY | Facility: CLINIC | Age: 35
End: 2025-04-14
Payer: COMMERCIAL

## 2025-04-16 ENCOUNTER — RESULTS FOLLOW-UP (OUTPATIENT)
Dept: OBSTETRICS AND GYNECOLOGY | Facility: CLINIC | Age: 35
End: 2025-04-16

## 2025-04-26 ENCOUNTER — HOSPITAL ENCOUNTER (OUTPATIENT)
Dept: RADIOLOGY | Facility: HOSPITAL | Age: 35
Discharge: HOME | End: 2025-04-26
Payer: COMMERCIAL

## 2025-04-26 DIAGNOSIS — T14.8XXA BRUISE: ICD-10-CM

## 2025-04-26 PROCEDURE — 76882 US LMTD JT/FCL EVL NVASC XTR: CPT | Mod: TC,RT

## 2025-04-26 PROCEDURE — 76882 US LMTD JT/FCL EVL NVASC XTR: CPT | Mod: 26,RT,, | Performed by: RADIOLOGY

## 2025-05-08 ENCOUNTER — OFFICE VISIT (OUTPATIENT)
Dept: PSYCHIATRY | Facility: CLINIC | Age: 35
End: 2025-05-08
Payer: COMMERCIAL

## 2025-05-08 VITALS
DIASTOLIC BLOOD PRESSURE: 77 MMHG | BODY MASS INDEX: 23.41 KG/M2 | SYSTOLIC BLOOD PRESSURE: 125 MMHG | WEIGHT: 123.88 LBS | HEART RATE: 85 BPM

## 2025-05-08 DIAGNOSIS — F31.81 BIPOLAR 2 DISORDER: ICD-10-CM

## 2025-05-08 DIAGNOSIS — F41.1 GAD (GENERALIZED ANXIETY DISORDER): ICD-10-CM

## 2025-05-08 PROCEDURE — 99214 OFFICE O/P EST MOD 30 MIN: CPT | Mod: S$GLB,,, | Performed by: NURSE PRACTITIONER

## 2025-05-08 PROCEDURE — 90833 PSYTX W PT W E/M 30 MIN: CPT | Mod: S$GLB,,, | Performed by: NURSE PRACTITIONER

## 2025-05-08 PROCEDURE — G2211 COMPLEX E/M VISIT ADD ON: HCPCS | Mod: S$GLB,,, | Performed by: NURSE PRACTITIONER

## 2025-05-08 PROCEDURE — 99999 PR PBB SHADOW E&M-EST. PATIENT-LVL II: CPT | Mod: PBBFAC,,, | Performed by: NURSE PRACTITIONER

## 2025-05-08 PROCEDURE — 3074F SYST BP LT 130 MM HG: CPT | Mod: CPTII,S$GLB,, | Performed by: NURSE PRACTITIONER

## 2025-05-08 PROCEDURE — 3008F BODY MASS INDEX DOCD: CPT | Mod: CPTII,S$GLB,, | Performed by: NURSE PRACTITIONER

## 2025-05-08 PROCEDURE — 3078F DIAST BP <80 MM HG: CPT | Mod: CPTII,S$GLB,, | Performed by: NURSE PRACTITIONER

## 2025-05-08 RX ORDER — LORAZEPAM 0.5 MG/1
TABLET ORAL
Qty: 10 TABLET | Refills: 0 | Status: SHIPPED | OUTPATIENT
Start: 2025-05-08

## 2025-05-08 RX ORDER — BUSPIRONE HYDROCHLORIDE 15 MG/1
TABLET ORAL
Qty: 90 TABLET | Refills: 1 | Status: SHIPPED | OUTPATIENT
Start: 2025-05-08

## 2025-05-08 RX ORDER — LAMOTRIGINE 300 MG/1
300 TABLET, EXTENDED RELEASE ORAL DAILY
Qty: 30 TABLET | Refills: 1 | Status: SHIPPED | OUTPATIENT
Start: 2025-05-08 | End: 2025-07-07

## 2025-05-08 RX ORDER — ARIPIPRAZOLE 15 MG/1
15 TABLET ORAL DAILY
Qty: 30 TABLET | Refills: 1 | Status: SHIPPED | OUTPATIENT
Start: 2025-05-08 | End: 2025-07-07

## 2025-05-08 NOTE — PROGRESS NOTES
Outpatient Psychiatry Follow-Up Visit ([facility])    05/08/2025    Clinical Status of Patient:  Outpatient (Ambulatory)    Chief Complaint:  Ratna Hanson is a 34 y.o. female who presents today for follow-up of mood disorder and anxiety.  Met with patient.     Current Medications:   Abilify to 10 mg Daily  Lamictal 250 mg Daily  Ativan 0.5 mg Daily PRN  Buspar 7.5 mg in AM, 15 mg in PM    Past medication trials:   Lexapro- suicidal thoughts age 16, during Post partum depression.  Zoloft- Anxiety, Depression, sexual side effects, (possible Body aches)     Interval History and Content of Current Session:  Patient seen and chart reviewed. Last seen on 4/8/24    Changes at last visit:  Continue Lamictal 250 mg ER Daily  Continue Abilify to 10 mg Daily  Continue Ativan 0.5 mg Daily PRN  Checked LA  and no irregularities were noted.  Last refill picked up on 3/4/24  Provided with 1, 10 tab refill, starting on 7/8/24   Continue  Buspar 7.5 mg in AM and noon, at noon and 15 mg in PM    History of Present Illness    HPI:  Patient reports experiencing significant depression for several months, rating it as a 7 out of 10 in severity, compared to a 1 out of 10 during her last visit. She describes feeling very low energy, lacking motivation, and being unable to focus or remember things. Patient sleeps 8-9 hours at night and naps for 2-3 hours during the day.    Patient complains of severe brain fog, poor memory, and difficulty concentrating, particularly when multiple people are talking. These cognitive issues have been persistent and are significantly impacting her daily functioning.    Patient reports not having experienced a manic episode in a long time, suggesting a shift in her bipolar disorder presentation, with depression becoming the predominant issue. Her anxiety levels are currently low, rating it as a 1 out of 10, only experiencing anxiety in specific situations like driving over bridges, which she describes as  ""normal, manageable anxiety."    Patient has been consistently taking Lamictal 250mg and Abilify 10mg daily. She occasionally skips the morning dose of Buspar due to it making her feel sick. Patient has used Ativan twice in the last month or two for anxiety. She expresses uncertainty about the effectiveness of her current medication regimen, stating, "I just feel like they are not working. Like something's not working."    Patient moved to a new location in September. While the move itself was stressful, she reports that the new living situation is generally positive, with more friends and family around and her daughters having made new friends. Patient denies suicidal thoughts, manic episodes, and frequent crying spells. She also denies any current pregnancies or plans for pregnancy.    LIFESTYLE:  Patient's  has had a vasectomy. She moved to [place] in September, where her new house is smaller than the previous one. In this new location, she has more friends and family around. Patient mentioned that she has not been working out recently.      ROS:  General: -fever, -chills, +fatigue, -weight gain, -weight loss, +decreased energy levels  Eyes: -vision changes, -redness, -discharge  ENT: -ear pain, -nasal congestion, -sore throat  Cardiovascular: -chest pain, -palpitations, -lower extremity edema  Respiratory: -cough, -shortness of breath  Gastrointestinal: -abdominal pain, -nausea, -vomiting, -diarrhea, -constipation, -blood in stool  Genitourinary: -dysuria, -hematuria, -frequency  Musculoskeletal: -joint pain, -muscle pain  Skin: -rash, -lesion  Neurological: -headache, -dizziness, -numbness, -tingling, +lack of focus/concentration, +thought or thinking problems or concerns, +memory loss, +excessive drowsiness  Psychiatric: -anxiety, +depression, -sleep difficulty, +excessive crying, +emotional lability           Pt appears:  Appropriate attire and affect    Mood:  Calm, happy    Sleep:  Improved 9-12 hrs " night. Naps for 2-3 hours in the day.     Appetite:  Normal, watching and mindful of diet    Self Rates Depression: 7/10  Self Rated Anxiety:  1/10    AIMS:    0    Review of Systems   PSYCHIATRIC: Pertinant items are noted in the narrative.    Past Medication Trials:    Past Medical, Family and Social History: The patient's past medical, family and social history have been reviewed and updated as appropriate within the electronic medical record - see encounter notes.    Compliance: yes    Side effects: None    Risk Parameters:  Patient reports no suicidal ideation  Patient reports no homicidal ideation  Patient reports no self-injurious behavior  Patient reports no violent behavior    Exam (detailed: at least 9 elements; comprehensive: all 15 elements)   Constitutional  Vitals:  Most recent vital signs, dated less than 90 days prior to this appointment, were reviewed.   Vitals:    05/08/25 1127   BP: 125/77   Pulse: 85   Weight: 56.2 kg (123 lb 14.4 oz)      General:  unremarkable, age appropriate     Musculoskeletal  Muscle Strength/Tone:  no spasicity, no rigidity, no cogwheeling, no flaccidity, no paratonia, no dyskinesia, no dystonia, no tremor, no tic, no choreoathetosis, no atrophy   Gait & Station:  non-ataxic     Psychiatric  Speech:  no latency; no press   Mood & Affect:  steady, euthymic  congruent and appropriate   Thought Process:  normal and logical   Associations:  intact   Thought Content:  normal, no suicidality, no homicidality, delusions, or paranoia   Insight:  intact, has awareness of illness   Judgement: behavior is adequate to circumstances, age appropriate   Orientation:  grossly intact   Memory: intact for content of interview   Language: grossly intact   Attention Span & Concentration:  able to focus   Fund of Knowledge:  intact and appropriate to age and level of education     Assessment and Diagnosis   Status/Progress: Based on the examination today, the patient's problem(s) is/are  adequately but not ideally controlled.  New problems have been presented today.   Co-morbidities, Diagnostic uncertainty, and Lack of compliance are complicating management of the primary condition.  There are no active rule-out diagnoses for this patient at this time.     General Impression:   Presents 5/9/23- Patient reports symptoms are well controlled but not ideal. She continues to have mild cycling of hypomania and depression. Lamictal currently at 200 mg. Will increase Abilify to 7 mg Daily, and reassess in 2-4 weeks. Labs reviewed from 03/23 WNL. AIMS=0   Present 6/12/23- Continue with previous plan from 5/9/23, will follow up in 2-4 weeks  .   Presents 8/14/23- Patient reports symptoms are well controlled but not ideal. She continues to have minimal cycling with some depression. Lamictal currently at 200 mg. Will increase Abilify to 10 mg Daily, and reassess in 8 weeks. Labs ordered to be drawn before next visit. AIMS=0 .   Presents 12/19/23- continues mild mood cycling. Plan to increase Lamictal after drug levels come back. Lamictal was increased to 250 mg Daily  Presents 1/24/24- Mood cycle seems stable. Anxiety is increased, Start Zoloft 50 mg Daily.  Presents 3/4/24- Mood has become unstable with Zoloft, Increase Buspar to TID, D/Zoloft.  Presents 4/8/24- Stable, Continue medications, monitor weight weekly.  Presents 7/8/24- Stable continue meds, RTC in 6 months  Presents 5/8/25-Increase Abilify to 15 mg daily, increase Lamictal to 300 mg daily. Consider changing to Vraylar      ICD-10-CM ICD-9-CM    1. DUNCAN (generalized anxiety disorder)  F41.1 300.02 LORazepam (ATIVAN) 0.5 MG tablet      busPIRone (BUSPAR) 15 MG tablet      2. Bipolar 2 disorder  F31.81 296.89 ARIPiprazole (ABILIFY) 15 MG Tab      lamotrigine XR (LAMICTAL XR) 300 mg XR tablet        Intervention/Counseling/Treatment Plan   Assessment & Plan    IMPRESSION:  Assessed current medication regimen and symptoms, noting significant depression  and cognitive issues.  Evaluated potential side effects and benefits of increasing current medications vs. switching to Vraylar.  Plan to reassess in 1 month to determine if medication changes are effective or if switch to Vraylar is necessary.  Considered potential use of Wellbutrin in the future if partial improvement is seen with current medication adjustments.    BIPOLAR DISORDER:  - Explained the difference between bipolar depression and unipolar depression.  - Discussed the potential risks of using antidepressants alone in bipolar disorder.  - Provided information on Vraylar as a treatment option for bipolar disorder and depression.  - Explained the mechanism of action for mood stabilizers and third-generation antipsychotics in managing bipolar disorder.  - Increased Lamictal from 250 mg to 300 mg daily.  - Increased Abilify from 10 mg to 15 mg daily to address depressive symptoms, with expected timeline of 2 weeks for initial effects and 6-8 weeks for full effect.    ANXIETY DISORDER:  - Continue Buspar.  - Refilled Ativan.    FOLLOW-UP AND MONITORING:  - Patient to send message when lab results are available.  - Contact office if experiencing any side effects or worsening symptoms before next appointment.       Discussed diagnosis, risk and benefits of proposed treatment above vs alternative treatment vs no treatment, and potential side effects of these treatments, and the inherent unpredictability of individual responses to these treatments. The patient expresses understanding and gives informed consent to pursue treatment at this time, believing that the potential benefits outweigh the potential risks. Patient has no other questions. Risks/adverse effects at this time include but are not limited to: GI side effects, sexual dysfunction, activation vs sedation, triggering of suicidal ideation, and serotonin syndrome.   Patient voices understanding and agreement with this plan  Provided crisis  numbers  Encouraged patient to keep future appointments  Instruct patient to call or message with questions  In the event of an emergency, including suicidal ideation, patient was advised to go to the emergency room  This note was generated with the assistance of ambient listening technology. Verbal consent was obtained by the patient and accompanying visitor(s) for the recording of patient appointment to facilitate this note. I attest to having reviewed and edited the generated note for accuracy, though some syntax or spelling errors may persist. Please contact the author of this note for any clarification.      Return to Clinic: 6 months      Laura Ritter DNP, PMKARELP, FNP

## 2025-05-09 ENCOUNTER — PATIENT MESSAGE (OUTPATIENT)
Dept: PSYCHIATRY | Facility: CLINIC | Age: 35
End: 2025-05-09
Payer: COMMERCIAL

## 2025-05-09 ENCOUNTER — LAB VISIT (OUTPATIENT)
Dept: LAB | Facility: HOSPITAL | Age: 35
End: 2025-05-09
Payer: COMMERCIAL

## 2025-05-09 DIAGNOSIS — Z00.00 ANNUAL PHYSICAL EXAM: ICD-10-CM

## 2025-05-09 LAB
25(OH)D3+25(OH)D2 SERPL-MCNC: 33 NG/ML (ref 30–96)
ABSOLUTE EOSINOPHIL (OHS): 0.13 K/UL
ABSOLUTE MONOCYTE (OHS): 0.61 K/UL (ref 0.3–1)
ABSOLUTE NEUTROPHIL COUNT (OHS): 4.43 K/UL (ref 1.8–7.7)
ALBUMIN SERPL BCP-MCNC: 4.4 G/DL (ref 3.5–5.2)
ALP SERPL-CCNC: 77 UNIT/L (ref 40–150)
ALT SERPL W/O P-5'-P-CCNC: 15 UNIT/L (ref 10–44)
ANION GAP (OHS): 7 MMOL/L (ref 8–16)
AST SERPL-CCNC: 17 UNIT/L (ref 11–45)
BASOPHILS # BLD AUTO: 0.04 K/UL
BASOPHILS NFR BLD AUTO: 0.6 %
BILIRUB SERPL-MCNC: 0.9 MG/DL (ref 0.1–1)
BUN SERPL-MCNC: 14 MG/DL (ref 6–20)
CALCIUM SERPL-MCNC: 9.3 MG/DL (ref 8.7–10.5)
CHLORIDE SERPL-SCNC: 106 MMOL/L (ref 95–110)
CHOLEST SERPL-MCNC: 148 MG/DL (ref 120–199)
CHOLEST/HDLC SERPL: 2.8 {RATIO} (ref 2–5)
CO2 SERPL-SCNC: 25 MMOL/L (ref 23–29)
CREAT SERPL-MCNC: 0.8 MG/DL (ref 0.5–1.4)
EAG (OHS): 100 MG/DL (ref 68–131)
ERYTHROCYTE [DISTWIDTH] IN BLOOD BY AUTOMATED COUNT: 12.6 % (ref 11.5–14.5)
FERRITIN SERPL-MCNC: 23.7 NG/ML (ref 20–300)
GFR SERPLBLD CREATININE-BSD FMLA CKD-EPI: >60 ML/MIN/1.73/M2
GLUCOSE SERPL-MCNC: 100 MG/DL (ref 70–110)
HBA1C MFR BLD: 5.1 % (ref 4–5.6)
HCT VFR BLD AUTO: 40.9 % (ref 37–48.5)
HCV AB SERPL QL IA: NORMAL
HDLC SERPL-MCNC: 53 MG/DL (ref 40–75)
HDLC SERPL: 35.8 % (ref 20–50)
HGB BLD-MCNC: 13.4 GM/DL (ref 12–16)
IMM GRANULOCYTES # BLD AUTO: 0.02 K/UL (ref 0–0.04)
IMM GRANULOCYTES NFR BLD AUTO: 0.3 % (ref 0–0.5)
IRON SATN MFR SERPL: 53 % (ref 20–50)
IRON SERPL-MCNC: 252 UG/DL (ref 30–160)
LDLC SERPL CALC-MCNC: 85.6 MG/DL (ref 63–159)
LYMPHOCYTES # BLD AUTO: 1.87 K/UL (ref 1–4.8)
MCH RBC QN AUTO: 31.2 PG (ref 27–31)
MCHC RBC AUTO-ENTMCNC: 32.8 G/DL (ref 32–36)
MCV RBC AUTO: 95 FL (ref 82–98)
NONHDLC SERPL-MCNC: 95 MG/DL
NUCLEATED RBC (/100WBC) (OHS): 0 /100 WBC
PLATELET # BLD AUTO: 262 K/UL (ref 150–450)
PMV BLD AUTO: 8.7 FL (ref 9.2–12.9)
POTASSIUM SERPL-SCNC: 4.5 MMOL/L (ref 3.5–5.1)
PROT SERPL-MCNC: 7.7 GM/DL (ref 6–8.4)
RBC # BLD AUTO: 4.29 M/UL (ref 4–5.4)
RELATIVE EOSINOPHIL (OHS): 1.8 %
RELATIVE LYMPHOCYTE (OHS): 26.3 % (ref 18–48)
RELATIVE MONOCYTE (OHS): 8.6 % (ref 4–15)
RELATIVE NEUTROPHIL (OHS): 62.4 % (ref 38–73)
SODIUM SERPL-SCNC: 138 MMOL/L (ref 136–145)
T4 SERPL-MCNC: 7.1 UG/DL (ref 4.5–11.5)
TIBC SERPL-MCNC: 480 UG/DL (ref 250–450)
TRANSFERRIN SERPL-MCNC: 324 MG/DL (ref 200–375)
TRIGL SERPL-MCNC: 47 MG/DL (ref 30–150)
TSH SERPL-ACNC: 1.62 UIU/ML (ref 0.4–4)
VIT B12 SERPL-MCNC: 439 PG/ML (ref 210–950)
WBC # BLD AUTO: 7.1 K/UL (ref 3.9–12.7)

## 2025-05-09 PROCEDURE — 36415 COLL VENOUS BLD VENIPUNCTURE: CPT

## 2025-05-09 PROCEDURE — 84436 ASSAY OF TOTAL THYROXINE: CPT

## 2025-05-09 PROCEDURE — 84443 ASSAY THYROID STIM HORMONE: CPT

## 2025-05-09 PROCEDURE — 82306 VITAMIN D 25 HYDROXY: CPT

## 2025-05-09 PROCEDURE — 83036 HEMOGLOBIN GLYCOSYLATED A1C: CPT

## 2025-05-09 PROCEDURE — 82728 ASSAY OF FERRITIN: CPT

## 2025-05-09 PROCEDURE — 80053 COMPREHEN METABOLIC PANEL: CPT

## 2025-05-09 PROCEDURE — 82607 VITAMIN B-12: CPT

## 2025-05-09 PROCEDURE — 80061 LIPID PANEL: CPT

## 2025-05-09 PROCEDURE — 86803 HEPATITIS C AB TEST: CPT

## 2025-05-09 PROCEDURE — 85025 COMPLETE CBC W/AUTO DIFF WBC: CPT

## 2025-05-09 PROCEDURE — 84466 ASSAY OF TRANSFERRIN: CPT

## 2025-05-12 ENCOUNTER — PATIENT MESSAGE (OUTPATIENT)
Dept: INTERNAL MEDICINE | Facility: CLINIC | Age: 35
End: 2025-05-12
Payer: COMMERCIAL

## 2025-05-12 ENCOUNTER — LAB VISIT (OUTPATIENT)
Dept: LAB | Facility: HOSPITAL | Age: 35
End: 2025-05-12
Payer: COMMERCIAL

## 2025-05-12 DIAGNOSIS — R79.89 HIGH TOTAL IRON BINDING CAPACITY: Primary | ICD-10-CM

## 2025-05-12 DIAGNOSIS — R79.89 HIGH TOTAL IRON BINDING CAPACITY: ICD-10-CM

## 2025-05-12 LAB
IRON SATN MFR SERPL: 21 % (ref 20–50)
IRON SERPL-MCNC: 95 UG/DL (ref 30–160)
TIBC SERPL-MCNC: 454 UG/DL (ref 250–450)
TRANSFERRIN SERPL-MCNC: 307 MG/DL (ref 200–375)

## 2025-05-12 PROCEDURE — 36415 COLL VENOUS BLD VENIPUNCTURE: CPT

## 2025-05-12 PROCEDURE — 83540 ASSAY OF IRON: CPT

## 2025-05-17 ENCOUNTER — RESULTS FOLLOW-UP (OUTPATIENT)
Dept: PRIMARY CARE CLINIC | Facility: CLINIC | Age: 35
End: 2025-05-17

## 2025-06-05 ENCOUNTER — PATIENT MESSAGE (OUTPATIENT)
Dept: PSYCHIATRY | Facility: CLINIC | Age: 35
End: 2025-06-05
Payer: COMMERCIAL

## 2025-06-05 ENCOUNTER — OFFICE VISIT (OUTPATIENT)
Dept: PSYCHIATRY | Facility: CLINIC | Age: 35
End: 2025-06-05
Payer: COMMERCIAL

## 2025-06-05 DIAGNOSIS — F31.81 BIPOLAR 2 DISORDER: Primary | ICD-10-CM

## 2025-06-05 DIAGNOSIS — F41.1 GAD (GENERALIZED ANXIETY DISORDER): ICD-10-CM

## 2025-06-05 RX ORDER — CARIPRAZINE 1.5 MG/1
1.5 CAPSULE, GELATIN COATED ORAL DAILY
Qty: 30 CAPSULE | Refills: 2 | Status: SHIPPED | OUTPATIENT
Start: 2025-06-05 | End: 2025-09-03

## 2025-06-05 RX ORDER — ARIPIPRAZOLE 2 MG/1
TABLET ORAL
Qty: 26 TABLET | Refills: 0 | Status: SHIPPED | OUTPATIENT
Start: 2025-06-05 | End: 2025-06-13

## 2025-06-05 RX ORDER — LAMOTRIGINE 100 MG/1
200 TABLET ORAL DAILY
Qty: 60 TABLET | Refills: 2 | Status: SHIPPED | OUTPATIENT
Start: 2025-06-05 | End: 2025-09-03

## 2025-06-27 ENCOUNTER — PATIENT MESSAGE (OUTPATIENT)
Dept: PSYCHIATRY | Facility: CLINIC | Age: 35
End: 2025-06-27
Payer: COMMERCIAL

## 2025-06-29 ENCOUNTER — PATIENT MESSAGE (OUTPATIENT)
Dept: PSYCHIATRY | Facility: CLINIC | Age: 35
End: 2025-06-29
Payer: COMMERCIAL

## 2025-07-01 NOTE — PROGRESS NOTES
Annual Wellness Visit  [facility]    Subjective:     Patient ID: Ratna Hanson is a 34 y.o. female.  History of Present Illness    CHIEF COMPLAINT:  Patient presents today to [facility] care and for annual visit     Last PCP Dr. Maria, last annual 3/2024. She is overall at her baseline however complains of chronic bruise.    MUSCULOSKELETAL:  She reports a persistent leg bruise from September when she hit her leg on a workout machine. The initial injury resulted in a large, dark, red spotty bruise. While the bruise has largely resolved, a pea-sized lump remains at the site of injury.  She denies any pain or irritation, it is just noticeable to her.     CURRENT ILLNESS:  She currently has a mild sore throat. She reports having the flu twice recently. Her daughter had a recent viral illness that tested negative for COVID, flu, and RSV.  Denies any current fever, cough, or shortness of breath.     GASTROINTESTINAL:  She experiences chronic nausea, particularly pronounced in the mornings with gagging when brushing teeth. She reports being easily susceptible to motion sickness, especially during car rides. She had severe nausea during both pregnancies. These symptoms have been present throughout her life and seem more pronounced than typical. She previously used antacids for heartburn. She denies active vomiting, diarrhea, or constipation.  EGD in 2023 was unremarkable.  She denies any significant symptoms today.     PAST MEDICAL HISTORY:  She has a history of bipolar disorder, anxiety, breast cysts diagnosed in 2018, and migraines triggered by caffeine. She reports history of borderline anemia in the distant past.    SURGICAL HISTORY:  She underwent EGD and colonoscopy in 2023, both normal. She had a normal swallow study and breast augmentation 9 years ago.    MEDICATIONS:  Her psychiatric medications include Abilify, Buspirone, Ativan, and Lamotrigine. She experienced a two-week gap in  "Abilify due to prescription refill issues, during which she felt "off" and "low". She denies taking any OTC supplements.    FAMILY HISTORY:  Sister has high cholesterol and underwent gallbladder removal approximately 10 years ago.    DIET AND EXERCISE:  She follows a mixed keto and Mediterranean diet, with meals primarily prepared by her . She reports consuming sweets. She walks approximately 3 miles on a treadmill 3 times per week, with each session lasting over 30 minutes.       Current Outpatient Medications   Medication Instructions    ARIPiprazole (ABILIFY) 2 MG Tab Take 5 tablets (10 mg total) by mouth once daily for 3 days, THEN 3 tablets (6 mg total) once daily for 3 days, THEN 1 tablet (2 mg total) once daily for 2 days.    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      She has a 12 year old, Gabo     Social Drivers of Health     Financial Resource Strain: Low Risk  (3/28/2025)    Overall Financial Resource Strain (CARDIA)     Difficulty of Paying Living Expenses: Not very hard   Food Insecurity: No Food " "Insecurity (3/28/2025)    Hunger Vital Sign     Worried About Running Out of Food in the Last Year: Never true     Ran Out of Food in the Last Year: Never true   Transportation Needs: No Transportation Needs (3/28/2025)    PRAPARE - Transportation     Lack of Transportation (Medical): No     Lack of Transportation (Non-Medical): No   Physical Activity: Sufficiently Active (3/28/2025)    Exercise Vital Sign     Days of Exercise per Week: 4 days     Minutes of Exercise per Session: 60 min   Stress: Stress Concern Present (3/28/2025)    [employer] - Occupational Stress Questionnaire     Feeling of Stress : To some extent   Housing Stability: Low Risk  (3/28/2025)    Housing Stability Vital Sign     Unable to Pay for Housing in the Last Year: No     Number of Times Moved in the Last Year: 1     Homeless in the Last Year: No     Objective:      Body mass index is 22.79 kg/m².  Vitals:    04/03/25 1116   BP: 129/86   Pulse: 99   SpO2: 95%   Weight: 54.7 kg (120 lb 9.5 oz)   Height: 5' 1" (1.549 m)   PainSc: 0-No pain     Physical Exam  Vitals reviewed.   Constitutional:       General: She is not in acute distress.     Appearance: Normal appearance.   HENT:      Head: Normocephalic.      Nose: Nose normal.      Mouth/Throat:      Mouth: Mucous membranes are moist.      Pharynx: Oropharynx is clear.   Eyes:      Extraocular Movements: Extraocular movements intact.      Pupils: Pupils are equal, round, and reactive to light.   Cardiovascular:      Rate and Rhythm: Normal rate and regular rhythm.      Heart sounds: Normal heart sounds.   Pulmonary:      Effort: No respiratory distress.      Breath sounds: Normal breath sounds.   Abdominal:      General: Bowel sounds are normal.      Palpations: Abdomen is soft.      Tenderness: There is no abdominal tenderness. There is no right CVA tenderness or left CVA tenderness.   Musculoskeletal:         General: Normal range of motion.      Cervical back: " Neck supple. No tenderness.      Right lower leg: No edema.      Left lower leg: No edema.   Skin:     General: Skin is warm and dry.      Comments: Small 1-2cm induration noted to right lateral thigh.  No erythema, hyperpigmentation, tenderness or fluctuance.    Neurological:      General: No focal deficit present.      Mental Status: She is alert and oriented to person, place, and time.   Psychiatric:         Behavior: Behavior normal.                  Assessment:       1. Encounter to establish care    2. Annual physical exam    3. Bruise    4. Bipolar depression    5. Anxiety    6. Chronic nausea        Plan:   Assessment & Plan    F31.81 Bipolar 2 disorder    IMPRESSION:  - Assessed bruise on leg from September injury, palpated small lump.  - Considered possibility of hematoma or scar tissue formation.  - Reviewed psychiatric medication regimen and recent labs, noting no concerning effects on platelets or liver function.  - Evaluated reported nausea symptoms.  - Assessed cardiac health, noting occasional palpitations likely related to anxiety.  - Considered thyroid function given reported night sweats, though not severe.  - Reviewed breast health history, noting previous cysts in 2018 and recent normal breast exam.       1. Encounter to establish care (Primary)    2. Annual physical exam  - Hemoglobin A1C; Future  - CBC Auto Differential; Future  - Comprehensive Metabolic Panel; Future  - Lipid Panel; Future  - TSH; Future  - Vitamin D 25-Hydroxy; Future  - T4; Future  - Hepatitis C antibody; Future  - Iron and TIBC; Future  - Ferritin; Future  - Vitamin B12; Future    3. Bruise  - US Soft Tissue, Lower Extremity, Right; Future    4. Bipolar depression  5. Anxiety  - follows closely with psych (Laura Ritter)  - continue current medication regimen as above    6. Chronic Nausea  - EGD in 2023 unremarkable  - currently stable  - resume daily PPI if symptoms not controlled with diet and lifestyle      Healthcare Maintenance  - counseled on lifestyle modifications including healthy diet options and 150min of moderate intensity exercise per week   - recommend daily safe sun exposure for optimal Vitamin D  - UTD on vaccines and preventative health screenings      All of your core healthy metrics are met.    Follow-up pending labs      Merary Suarez MD  Internal Medicine    Part of this note is dictated using the M*Modal Fluency Direct word recognition program. It may contain word recognition mistakes or wrong word substitutions (commonly he/she and is/was substitutions) that were missed on review.    Part of this note was generated with the assistance of ambient listening technology. Verbal consent was obtained by the patient and accompanying visitor(s) for the recording of patient appointment to facilitate this note. I attest to having reviewed and edited the generated note for accuracy, though some syntax or spelling errors may persist. Please contact the author of this note for any clarification.

## 2025-07-24 ENCOUNTER — OFFICE VISIT (OUTPATIENT)
Dept: PSYCHIATRY | Facility: CLINIC | Age: 35
End: 2025-07-24
Payer: COMMERCIAL

## 2025-07-24 DIAGNOSIS — F41.1 GAD (GENERALIZED ANXIETY DISORDER): ICD-10-CM

## 2025-07-24 DIAGNOSIS — F31.81 BIPOLAR 2 DISORDER: ICD-10-CM

## 2025-07-24 PROCEDURE — G2211 COMPLEX E/M VISIT ADD ON: HCPCS | Mod: 95,,, | Performed by: NURSE PRACTITIONER

## 2025-07-24 PROCEDURE — 98006 SYNCH AUDIO-VIDEO EST MOD 30: CPT | Mod: 95,,, | Performed by: NURSE PRACTITIONER

## 2025-07-24 PROCEDURE — 3044F HG A1C LEVEL LT 7.0%: CPT | Mod: CPTII,95,, | Performed by: NURSE PRACTITIONER

## 2025-07-24 RX ORDER — LORAZEPAM 0.5 MG/1
TABLET ORAL
Qty: 10 TABLET | Refills: 0 | Status: SHIPPED | OUTPATIENT
Start: 2025-07-24

## 2025-07-24 RX ORDER — LAMOTRIGINE 200 MG/1
200 TABLET ORAL DAILY
Qty: 90 TABLET | Refills: 0 | Status: SHIPPED | OUTPATIENT
Start: 2025-07-24 | End: 2025-10-22

## 2025-07-24 NOTE — PROGRESS NOTES
"The patient location is: [place], [place]  The chief complaint leading to consultation is: Bipolar Disorder     Visit type: audiovisual    Each patient to whom he or she provides medical services by telemedicine is:  (1) informed of the relationship between the physician and patient and the respective role of any other health care provider with respect to management of the patient; and (2) notified that he or she may decline to receive medical services by telemedicine and may withdraw from such care at any time.    Notes:     Outpatient Psychiatry Follow-Up Visit ([facility])    07/24/2025    Clinical Status of Patient:  Outpatient (Ambulatory)    Chief Complaint:  Ratna Hanson is a 34 y.o. female who presents today for follow-up of mood disorder and anxiety.  Met with patient.     Current Medications:   Vraylar 1.5 mg Daily  Lamictal 200 mg Daily  Ativan 0.5 mg Daily PRN  Buspar 7.5 mg in AM, 15 mg in PM    Past medication trials:   Lexapro- suicidal thoughts age 16, during Post partum depression.  Zoloft- Anxiety, Depression, sexual side effects, (possible Body aches)   Abilify- initially effective    Interval History and Content of Current Session:  Patient seen and chart reviewed. Last seen on 4/8/24    Changes at last visit:  Lower Lamictal 200 mg Daily  Start Vraylar 1.5 mg daily (at same time as tapering off Abilify)  Discontinue Abilify:  Lower Abilify to 10 mg Daily, for 4 days,   Lower then 5 mg daily for 3 days  Lower 2 mg for 2 days Then STOP Abilify,  Continue Ativan 0.5 mg Daily as needed for severe anxiety   Continue Buspar 7.5 mg in AM and noon, at noon and 15 mg in PM    History of Present Illness    HPI:  Patient reports switching from Abilify to Vraylar about seven weeks ago. Initially, she experienced headaches, which she attributes to the medication change. Despite this, she is feeling "much better" than before the switch. She describes a significant improvement in her mood, noting that before the " "switch, she was "really depressed," sleeping excessively, lacking motivation, and "feeling terrible." Now, she feels her mood has stabilized and improved considerably.    In the last two weeks, the patient has noticed symptoms associated with past manic episodes, primarily increased irritability and anger, particularly when dealing with her daughter. She reports becoming short-tempered and having difficulty handling situations, leading to snapping at her daughter and needing to leave the room. These incidents occur approximately every other day.    Patient's sleep patterns have improved since starting Vraylar. She now sleeps about 8 hours per night and has reduced her daytime napping to about once a week, down from 1-2 naps daily previously. Patient reports some anxiety, rating it at 2-3 out of 10 on average. She has been using lorazepam (Ativan) to manage anxiety symptoms, finding that half a dose helps. She also takes Buspar at night for anxiety but experiences side effects if taken during the day.    Patient notes a significant improvement in her cognitive function, specifically mentioning that her "brain fog is way better" after reducing her Lamictal dose. She reports restless legs at night, though not consistently. This symptom may be related to the timing of her Vraylar dose. Patient rates her current depression level as very low, between 0-1 out of 10, describing her mood as "very normal now" with occasional, normal feelings of sadness. She denies changes in sleep patterns, elevated behaviors outside of irritability, and any movement issues or repetitive behaviors.    LIFESTYLE:  Patient lives with her daughter. Her hobbies and interests include watching TV.      ROS:  General: -fever, -chills, -fatigue, -weight gain, -weight loss, +decreased need for sleep  Eyes: -vision changes, -redness, -discharge  ENT: -ear pain, -nasal congestion, -sore throat  Cardiovascular: -chest pain, -palpitations, -lower " extremity edema  Respiratory: -cough, -shortness of breath  Gastrointestinal: -abdominal pain, -nausea, -vomiting, -diarrhea, -constipation, -blood in stool  Genitourinary: -dysuria, -hematuria, -frequency  Musculoskeletal: -joint pain, -muscle pain  Skin: -rash, -lesion  Neurological: +headache, -dizziness, -numbness, -tingling, +restless legs, +confusion or disorientation, +thought or thinking problems or concerns  Psychiatric: +anxiety, +depression, -sleep difficulty, +anger problems, +irritability         Pt appears:  Appropriate attire and affect    Mood:  Calm, happy    Sleep:  Improved 8 hrs night. No longer napping    Appetite:  Normal, watching and mindful of diet    Self Rates Depression: 0-1/10  Self Rated Anxiety:  2-3/10    AIMS:    0    Review of Systems   PSYCHIATRIC: Pertinant items are noted in the narrative.    Past Medication Trials:    Past Medical, Family and Social History: The patient's past medical, family and social history have been reviewed and updated as appropriate within the electronic medical record - see encounter notes.    Compliance: yes    Side effects: None    Risk Parameters:  Patient reports no suicidal ideation  Patient reports no homicidal ideation  Patient reports no self-injurious behavior  Patient reports no violent behavior    Exam (detailed: at least 9 elements; comprehensive: all 15 elements)   Constitutional  Vitals:  Most recent vital signs, dated less than 90 days prior to this appointment, were reviewed.   There were no vitals filed for this visit.     General:  unremarkable, age appropriate     Musculoskeletal  Muscle Strength/Tone:  no spasicity, no rigidity, no cogwheeling, no flaccidity, no paratonia, no dyskinesia, no dystonia, no tremor, no tic, no choreoathetosis, no atrophy   Gait & Station:  non-ataxic     Psychiatric  Speech:  no latency; no press   Mood & Affect:  steady, euthymic  congruent and appropriate   Thought Process:  normal and logical    Associations:  intact   Thought Content:  normal, no suicidality, no homicidality, delusions, or paranoia   Insight:  intact, has awareness of illness   Judgement: behavior is adequate to circumstances, age appropriate   Orientation:  grossly intact   Memory: intact for content of interview   Language: grossly intact   Attention Span & Concentration:  able to focus   Fund of Knowledge:  intact and appropriate to age and level of education     Assessment and Diagnosis   Status/Progress: Based on the examination today, the patient's problem(s) is/are adequately but not ideally controlled.  New problems have been presented today.   Co-morbidities, Diagnostic uncertainty, and Lack of compliance are complicating management of the primary condition.  There are no active rule-out diagnoses for this patient at this time.     General Impression:   Presents 5/9/23- Patient reports symptoms are well controlled but not ideal. She continues to have mild cycling of hypomania and depression. Lamictal currently at 200 mg. Will increase Abilify to 7 mg Daily, and reassess in 2-4 weeks. Labs reviewed from 03/23 WNL. AIMS=0   Present 6/12/23- Continue with previous plan from 5/9/23, will follow up in 2-4 weeks  .   Presents 8/14/23- Patient reports symptoms are well controlled but not ideal. She continues to have minimal cycling with some depression. Lamictal currently at 200 mg. Will increase Abilify to 10 mg Daily, and reassess in 8 weeks. Labs ordered to be drawn before next visit. AIMS=0 .   Presents 12/19/23- continues mild mood cycling. Plan to increase Lamictal after drug levels come back. Lamictal was increased to 250 mg Daily  Presents 1/24/24- Mood cycle seems stable. Anxiety is increased, Start Zoloft 50 mg Daily.  Presents 3/4/24- Mood has become unstable with Zoloft, Increase Buspar to TID, D/Zoloft.  Presents 4/8/24- Stable, Continue medications, monitor weight weekly.  Presents 7/8/24- Stable continue meds, RTC in 6  months  Presents 5/8/25-Increase Abilify to 15 mg daily, increase Lamictal to 300 mg daily. Consider changing to Vraylar  Presents 6/5/25- D/c Abilify, Start Vraylar 1.5 mg , Lower Lamictal 200 mg Daily.   Presents 7/24/25- Increase Vraylar 3 mg Daily      ICD-10-CM ICD-9-CM    1. DUNCAN (generalized anxiety disorder)  F41.1 300.02 LORazepam (ATIVAN) 0.5 MG tablet      2. Bipolar 2 disorder  F31.81 296.89 cariprazine (VRAYLAR) 3 mg Cap      lamoTRIgine (LAMICTAL) 200 MG tablet          Intervention/Counseling/Treatment Plan   Assessment & Plan    IMPRESSION:  Increased Vraylar dose from 1.5 mg to 3 mg daily to address persistent irritability and potential hypomania symptoms.  Maintained current Lamictal 200 mg daily due to improved brain fog and overall mood stability.  Considered potential anxiety component contributing to irritability.  Refrained from adding antidepressants due to past adverse reactions.  Considered propranolol as a potential treatment for akathisia if symptoms worsen.    BIPOLAR DISORDER:  - Increased Vraylar dose from 1.5 mg to 3 mg daily to address persistent irritability and potential hypomania symptoms.  - Maintained current Lamictal 200 mg daily due to improved brain fog and overall mood stability.  - Continued Buspar (buspirone) at current dose.    ANXIETY DISORDER:  - Renewed Ativan (lorazepam) for as-needed use, 10 tablets to manage acute anxiety/irritability.    MEDICATION SIDE EFFECTS:  - Educated on akathisia as a potential side effect of Vraylar, describing it as an inability to sit still.  - Explained propranolol's mechanism of action in blocking the fight or flight response and its potential benefits for akathisia.  - Monitoring for akathisia as a potential side effect of Vraylar.  - Contact the office if experiencing worsening restless leg symptoms or other movement issues.    FOLLOW-UP CARE:  - Follow up in 6 weeks.  - Message provider if any issues arise before next appointment.        Discussed diagnosis, risk and benefits of proposed treatment above vs alternative treatment vs no treatment, and potential side effects of these treatments, and the inherent unpredictability of individual responses to these treatments. The patient expresses understanding and gives informed consent to pursue treatment at this time, believing that the potential benefits outweigh the potential risks. Patient has no other questions. Risks/adverse effects at this time include but are not limited to: GI side effects, sexual dysfunction, activation vs sedation, triggering of suicidal ideation, and serotonin syndrome.   Patient voices understanding and agreement with this plan  Provided crisis numbers  Encouraged patient to keep future appointments  Instruct patient to call or message with questions  In the event of an emergency, including suicidal ideation, patient was advised to go to the emergency room  This note was generated with the assistance of ambient listening technology. Verbal consent was obtained by the patient and accompanying visitor(s) for the recording of patient appointment to facilitate this note. I attest to having reviewed and edited the generated note for accuracy, though some syntax or spelling errors may persist. Please contact the author of this note for any clarification.      Return to Clinic: 6 weeks      Laura Ritter DNP, MADDYP, FNP

## 2025-09-04 ENCOUNTER — OFFICE VISIT (OUTPATIENT)
Dept: PSYCHIATRY | Facility: CLINIC | Age: 35
End: 2025-09-04
Payer: COMMERCIAL

## 2025-09-04 DIAGNOSIS — F41.1 GAD (GENERALIZED ANXIETY DISORDER): ICD-10-CM

## 2025-09-04 DIAGNOSIS — F31.81 BIPOLAR 2 DISORDER: ICD-10-CM

## 2025-09-04 RX ORDER — LAMOTRIGINE 200 MG/1
200 TABLET ORAL DAILY
Qty: 90 TABLET | Refills: 0 | Status: SHIPPED | OUTPATIENT
Start: 2025-09-04 | End: 2025-12-03

## 2025-09-04 RX ORDER — BUSPIRONE HYDROCHLORIDE 15 MG/1
TABLET ORAL
Qty: 90 TABLET | Refills: 0 | Status: SHIPPED | OUTPATIENT
Start: 2025-09-04

## 2025-09-04 RX ORDER — LORAZEPAM 0.5 MG/1
TABLET ORAL
Qty: 10 TABLET | Refills: 0 | Status: SHIPPED | OUTPATIENT
Start: 2025-10-04